# Patient Record
Sex: FEMALE | Race: WHITE | NOT HISPANIC OR LATINO | Employment: FULL TIME | URBAN - METROPOLITAN AREA
[De-identification: names, ages, dates, MRNs, and addresses within clinical notes are randomized per-mention and may not be internally consistent; named-entity substitution may affect disease eponyms.]

---

## 2018-07-18 ENCOUNTER — CLINICAL SUPPORT (OUTPATIENT)
Dept: SMOKING CESSATION | Facility: CLINIC | Age: 58
End: 2018-07-18
Payer: COMMERCIAL

## 2018-07-18 VITALS
WEIGHT: 150 LBS | SYSTOLIC BLOOD PRESSURE: 136 MMHG | HEIGHT: 65 IN | BODY MASS INDEX: 24.99 KG/M2 | DIASTOLIC BLOOD PRESSURE: 87 MMHG

## 2018-07-18 DIAGNOSIS — F17.210 NICOTINE DEPENDENCE, CIGARETTES, UNCOMPLICATED: Primary | ICD-10-CM

## 2018-07-18 PROCEDURE — 99999 PR PBB SHADOW E&M-NEW PATIENT-LVL II: CPT | Mod: PBBFAC,,,

## 2018-07-18 PROCEDURE — 99404 PREV MED CNSL INDIV APPRX 60: CPT | Mod: S$GLB,,, | Performed by: INTERNAL MEDICINE

## 2018-07-18 RX ORDER — GABAPENTIN 300 MG/1
300 CAPSULE ORAL NIGHTLY
COMMUNITY

## 2018-07-18 RX ORDER — GUAIFENESIN, PSEUDOEPHEDRINE HYDROCHLORIDE 600; 60 MG/1; MG/1
1 TABLET, EXTENDED RELEASE ORAL
COMMUNITY
End: 2021-02-04

## 2018-07-18 RX ORDER — VARENICLINE TARTRATE 0.5 (11)-1
KIT ORAL
Qty: 1 PACKAGE | Refills: 0 | Status: SHIPPED | OUTPATIENT
Start: 2018-07-18 | End: 2018-08-14 | Stop reason: DRUGHIGH

## 2018-07-18 NOTE — Clinical Note
IRMA D score of 6 is perceived as no mental distress or depression at this time. Patient will be participating in weekly tobacco cessation meetings and will begin the prescribed tobacco cessation medication regimen of  Chantix starter pack. Patient requested Chantix and has used Chantix before without any negative effects.

## 2018-07-31 ENCOUNTER — CLINICAL SUPPORT (OUTPATIENT)
Dept: SMOKING CESSATION | Facility: CLINIC | Age: 58
End: 2018-07-31
Payer: COMMERCIAL

## 2018-07-31 DIAGNOSIS — F17.210 NICOTINE DEPENDENCE, CIGARETTES, UNCOMPLICATED: Primary | ICD-10-CM

## 2018-07-31 PROCEDURE — 99404 PREV MED CNSL INDIV APPRX 60: CPT | Mod: S$GLB,,, | Performed by: INTERNAL MEDICINE

## 2018-07-31 PROCEDURE — 99999 PR PBB SHADOW E&M-EST. PATIENT-LVL I: CPT | Mod: PBBFAC,,,

## 2018-07-31 NOTE — PROGRESS NOTES
Individual Follow-Up Form    7/31/2018    Quit Date:     Clinical Status of Patient: Outpatient    Length of Service: 60 minutes    Continuing Medication: yes  Chantix         Target Symptoms: Withdrawal and medication side effects. The following were  rated moderate (3) to severe (4) on TCRS:  · Moderate (3): none  · Severe (4): none    Comments: Session 1:  Orientation, client introductions, completion of TCRS (Tobacco Cessation Rating Scale) learned addiction model, cues/triggers, personal reasons for quitting, medications, goals, quit date.  Patient is smoking 10 cigarettes per day with last smoked 2 hours prior to visit and has a measured CO of 11 ppm. Patient remains on prescribed tobacco cessation medication regimen 1 mg Chantix  without any negative side effects at this time.    Diagnosis: F17.210    Next Visit: 1 week

## 2018-08-07 ENCOUNTER — CLINICAL SUPPORT (OUTPATIENT)
Dept: SMOKING CESSATION | Facility: CLINIC | Age: 58
End: 2018-08-07
Payer: COMMERCIAL

## 2018-08-07 DIAGNOSIS — F17.210 NICOTINE DEPENDENCE, CIGARETTES, UNCOMPLICATED: Primary | ICD-10-CM

## 2018-08-07 PROCEDURE — 99999 PR PBB SHADOW E&M-EST. PATIENT-LVL I: CPT | Mod: PBBFAC,,,

## 2018-08-07 PROCEDURE — 99404 PREV MED CNSL INDIV APPRX 60: CPT | Mod: S$GLB,,, | Performed by: INTERNAL MEDICINE

## 2018-08-07 NOTE — PROGRESS NOTES
Smoking Cessation Group Session #2    Site: [facility]    Date:  8/7/2018  Clinical Status of Patient: Outpatient   Length of Service and Code: 60 minutes - 99107   Group Activities/Focus of Group:  Sharing last weeks challenges, triggers, and coping activities to remain quit and/ or keep making progress toward cessation, completion of TCRS (Tobacco Cessation Rating Scale) learned addiction model, personal reasons for quitting, medications, goals, quit date.        Target symptoms:  withdrawal and medication side effects             The following were rated moderate (3) to severe (4) on TCRS:       Moderate 3: none     Severe 4:   none  Patient's Response to Intervention: patient has reduced smoking to 15 cigarettes per day with last smoked 30 minutes prior to visit and has a measured CO of 14 ppm. A reduction strategy will be employed to reduce  Smoking by 25 %. Patient remains on prescribed tobacco cessation medication regimen of 1 mg Chantix BID without any negative side effects at this time.   Progress Toward Goals and Other Mental Status Changes: The patient denies any abnormal behavioral or mental changes at this time.         Diagnosis: Z72.0  Plan: The patient will continue with group therapy sessions and medication monitoring by CTTS. Prescribed medication management will be by physician.     Return to Clinic: 1 week    Quit Date:    Planned Quit Date:

## 2018-08-13 DIAGNOSIS — F17.210 NICOTINE DEPENDENCE, CIGARETTES, UNCOMPLICATED: ICD-10-CM

## 2018-08-14 ENCOUNTER — CLINICAL SUPPORT (OUTPATIENT)
Dept: SMOKING CESSATION | Facility: CLINIC | Age: 58
End: 2018-08-14
Payer: COMMERCIAL

## 2018-08-14 DIAGNOSIS — F17.210 NICOTINE DEPENDENCE, CIGARETTES, UNCOMPLICATED: Primary | ICD-10-CM

## 2018-08-14 PROCEDURE — 99999 PR PBB SHADOW E&M-EST. PATIENT-LVL I: CPT | Mod: PBBFAC,,,

## 2018-08-14 PROCEDURE — 99404 PREV MED CNSL INDIV APPRX 60: CPT | Mod: S$GLB,,, | Performed by: INTERNAL MEDICINE

## 2018-08-14 RX ORDER — VARENICLINE TARTRATE 1 MG/1
1 TABLET, FILM COATED ORAL 2 TIMES DAILY
Qty: 60 TABLET | Refills: 0 | Status: SHIPPED | OUTPATIENT
Start: 2018-08-14 | End: 2021-02-04

## 2018-08-14 NOTE — PROGRESS NOTES
Individual Follow-Up Form    8/14/2018    Quit Date:     Clinical Status of Patient: Outpatient    Length of Service: 60 minutes    Continuing Medication: Chantix     Target Symptoms: Withdrawal and medication side effects. The following were  rated moderate (3) to severe (4) on TCRS:  · Moderate (3): none  · Severe (4): none    Comments: Patient has been tobacco free for 1 day and has a measured CO of 3 ppm. completion of TCRS (Tobacco Cessation Rating Scale) reviewed strategies, controlling environment, cues, triggers, new goals set. Introduced high risk situations with preparation interventions, caffeine similarities with withdrawal issues of habit and nicotine, Alcohol, Understanding urges, cravings, stress and relaxation. Open discussion with intervention discussion.Patient remains on prescribed tobacco cessation medication regimen of 1 mg Chantix  without any negative side effects at this time.        Diagnosis: F17.210    Next Visit: 1 week

## 2018-08-20 RX ORDER — VARENICLINE TARTRATE 0.5 (11)-1
KIT ORAL
Qty: 53 TABLET | Refills: 0 | Status: SHIPPED | OUTPATIENT
Start: 2018-08-20 | End: 2021-02-04

## 2018-08-21 ENCOUNTER — CLINICAL SUPPORT (OUTPATIENT)
Dept: SMOKING CESSATION | Facility: CLINIC | Age: 58
End: 2018-08-21
Payer: COMMERCIAL

## 2018-08-21 DIAGNOSIS — F17.210 NICOTINE DEPENDENCE, CIGARETTES, UNCOMPLICATED: Primary | ICD-10-CM

## 2018-08-21 PROCEDURE — 99999 PR PBB SHADOW E&M-EST. PATIENT-LVL II: CPT | Mod: PBBFAC,,,

## 2018-08-21 PROCEDURE — 99404 PREV MED CNSL INDIV APPRX 60: CPT | Mod: S$GLB,,, | Performed by: INTERNAL MEDICINE

## 2018-08-21 NOTE — PROGRESS NOTES
Individual Follow-Up Form    8/21/2018    Quit Date:     Clinical Status of Patient: Outpatient    Length of Service: 60 minutes    Continuing Medication: yes  Chantix         Target Symptoms: Withdrawal and medication side effects. The following were  rated moderate (3) to severe (4) on TCRS:  · Moderate (3): none  · Severe (4): none    Comments: patient is smoking 2 cigarettes per day with last smoked 5 hours prior to visit and has a measured CO of 8 ppm. Patient remains on prescribed tobacco cessation medication regimen of 1 mg Chantix without any negative side effects at this time.completion of TCRS (Tobacco Cessation Rating Scale) reviewed strategies, habitual behavior, stress, and high risk situations. Introduced stress with addition interventions, SOLVE, relaxation with interventions, nutrition, exercise, weight gain, and the importance of rewarding oneself for accomplishments toward becoming tobacco free. Open discussion of all items with interventions.         Diagnosis: F17.210    Next Visit: 1 week

## 2018-08-28 ENCOUNTER — CLINICAL SUPPORT (OUTPATIENT)
Dept: SMOKING CESSATION | Facility: CLINIC | Age: 58
End: 2018-08-28
Payer: COMMERCIAL

## 2018-08-28 DIAGNOSIS — F17.210 NICOTINE DEPENDENCE, CIGARETTES, UNCOMPLICATED: Primary | ICD-10-CM

## 2018-08-28 PROCEDURE — 99404 PREV MED CNSL INDIV APPRX 60: CPT | Mod: S$GLB,,, | Performed by: INTERNAL MEDICINE

## 2018-08-28 PROCEDURE — 99999 PR PBB SHADOW E&M-EST. PATIENT-LVL I: CPT | Mod: PBBFAC,,,

## 2018-08-28 RX ORDER — BUPROPION HYDROCHLORIDE 150 MG/1
150 TABLET, EXTENDED RELEASE ORAL 2 TIMES DAILY
Qty: 60 TABLET | Refills: 0 | Status: SHIPPED | OUTPATIENT
Start: 2018-08-28 | End: 2018-09-26 | Stop reason: SDUPTHER

## 2018-08-28 NOTE — Clinical Note
patient has reduced smoking to 2 cigarettes per day and has been tobacco free for 1 day . Patient has a measured CO of 4 ppm. Patient is experiencing life stressors which triggers smoking episodes. Patient remains on prescribed tobacco cessation medication regimen of Chantix without any negative effects, 150 mg Wellbutrin will be added to the regimen no contraindications noted.

## 2018-08-28 NOTE — PROGRESS NOTES
Individual Follow-Up Form    8/28/2018    Quit Date:     Clinical Status of Patient: Outpatient    Length of Service: 60 minutes    Continuing Medication: yes  Chantix    Other Medications: Wellbutrin     Target Symptoms: Withdrawal and medication side effects. The following were  rated moderate (3) to severe (4) on TCRS:  · Moderate (3): none  · Severe (4): none    Comments: patient has reduced smoking to 2 cigarettes per day and has been tobacco free for 1 day . Patient has a measured CO of 4 ppm. Patient is experiencing life stressors which triggers smoking episodes. Patient remains on prescribed tobacco cessation medication regimen of Chantix without any negative effects, 150 mg Wellbutrin will be added to the regimen.   Diagnosis: F17.210    Next Visit: 1 week

## 2018-09-05 ENCOUNTER — CLINICAL SUPPORT (OUTPATIENT)
Dept: SMOKING CESSATION | Facility: CLINIC | Age: 58
End: 2018-09-05
Payer: COMMERCIAL

## 2018-09-05 DIAGNOSIS — F17.210 NICOTINE DEPENDENCE, CIGARETTES, UNCOMPLICATED: Primary | ICD-10-CM

## 2018-09-05 PROCEDURE — 99407 BEHAV CHNG SMOKING > 10 MIN: CPT | Mod: S$GLB,,, | Performed by: GENERAL PRACTICE

## 2018-09-05 NOTE — PROGRESS NOTES
We reviewed strategies for  habitual behavior, high risks situations, understanding urges and cravings, stress and relaxation and discussed additional interventions.

## 2018-09-16 DIAGNOSIS — F17.210 NICOTINE DEPENDENCE, CIGARETTES, UNCOMPLICATED: ICD-10-CM

## 2018-09-26 DIAGNOSIS — F17.210 NICOTINE DEPENDENCE, CIGARETTES, UNCOMPLICATED: ICD-10-CM

## 2018-09-26 RX ORDER — BUPROPION HYDROCHLORIDE 150 MG/1
TABLET, EXTENDED RELEASE ORAL
Qty: 60 TABLET | Refills: 0 | Status: SHIPPED | OUTPATIENT
Start: 2018-09-26 | End: 2018-10-23 | Stop reason: SDUPTHER

## 2018-10-11 RX ORDER — VARENICLINE TARTRATE 0.5 (11)-1
KIT ORAL
Refills: 0 | OUTPATIENT
Start: 2018-10-11

## 2018-10-23 DIAGNOSIS — F17.210 NICOTINE DEPENDENCE, CIGARETTES, UNCOMPLICATED: ICD-10-CM

## 2018-10-23 RX ORDER — BUPROPION HYDROCHLORIDE 150 MG/1
TABLET, EXTENDED RELEASE ORAL
Qty: 60 TABLET | Refills: 0 | Status: SHIPPED | OUTPATIENT
Start: 2018-10-23 | End: 2018-11-19 | Stop reason: SDUPTHER

## 2018-11-19 DIAGNOSIS — F17.210 NICOTINE DEPENDENCE, CIGARETTES, UNCOMPLICATED: ICD-10-CM

## 2018-11-19 RX ORDER — BUPROPION HYDROCHLORIDE 150 MG/1
TABLET, EXTENDED RELEASE ORAL
Qty: 60 TABLET | Refills: 0 | Status: SHIPPED | OUTPATIENT
Start: 2018-11-19 | End: 2018-12-15 | Stop reason: SDUPTHER

## 2018-12-15 DIAGNOSIS — F17.210 NICOTINE DEPENDENCE, CIGARETTES, UNCOMPLICATED: ICD-10-CM

## 2018-12-15 RX ORDER — BUPROPION HYDROCHLORIDE 150 MG/1
TABLET, EXTENDED RELEASE ORAL
Qty: 60 TABLET | Refills: 0 | Status: SHIPPED | OUTPATIENT
Start: 2018-12-15 | End: 2019-01-14 | Stop reason: SDUPTHER

## 2019-01-14 DIAGNOSIS — F17.210 NICOTINE DEPENDENCE, CIGARETTES, UNCOMPLICATED: ICD-10-CM

## 2019-01-14 RX ORDER — BUPROPION HYDROCHLORIDE 150 MG/1
TABLET, EXTENDED RELEASE ORAL
Qty: 60 TABLET | Refills: 0 | Status: SHIPPED | OUTPATIENT
Start: 2019-01-14 | End: 2019-02-14 | Stop reason: SDUPTHER

## 2019-01-22 ENCOUNTER — TELEPHONE (OUTPATIENT)
Dept: SMOKING CESSATION | Facility: CLINIC | Age: 59
End: 2019-01-22

## 2019-02-14 DIAGNOSIS — F17.210 NICOTINE DEPENDENCE, CIGARETTES, UNCOMPLICATED: ICD-10-CM

## 2019-02-14 RX ORDER — BUPROPION HYDROCHLORIDE 150 MG/1
TABLET, EXTENDED RELEASE ORAL
Qty: 60 TABLET | Refills: 0 | Status: SHIPPED | OUTPATIENT
Start: 2019-02-14 | End: 2019-03-22 | Stop reason: SDUPTHER

## 2019-02-15 ENCOUNTER — CLINICAL SUPPORT (OUTPATIENT)
Dept: SMOKING CESSATION | Facility: CLINIC | Age: 59
End: 2019-02-15
Payer: COMMERCIAL

## 2019-02-15 DIAGNOSIS — F17.200 NICOTINE DEPENDENCE: Primary | ICD-10-CM

## 2019-02-15 PROCEDURE — 99407 BEHAV CHNG SMOKING > 10 MIN: CPT | Mod: S$GLB,,, | Performed by: INTERNAL MEDICINE

## 2019-02-15 PROCEDURE — 99407 PR TOBACCO USE CESSATION INTENSIVE >10 MINUTES: ICD-10-PCS | Mod: S$GLB,,, | Performed by: INTERNAL MEDICINE

## 2019-02-15 NOTE — PROGRESS NOTES
Spoke with patient today in regard to smoking cessation progress for 3/6 month telephone follow up, she states tobacco free since 9/6/2018. Commended patient on the accomplishment. Patient states she has gained about 20 pounds since her quit. Informed patient of benefit period, future follow up, and contact information if any further help or support is needed. Will complete smart form for 3 and 6 month follow up on Quit attempt #1.

## 2019-03-22 DIAGNOSIS — F17.210 NICOTINE DEPENDENCE, CIGARETTES, UNCOMPLICATED: ICD-10-CM

## 2019-03-22 RX ORDER — BUPROPION HYDROCHLORIDE 150 MG/1
TABLET, EXTENDED RELEASE ORAL
Qty: 60 TABLET | Refills: 0 | Status: SHIPPED | OUTPATIENT
Start: 2019-03-22 | End: 2019-04-25 | Stop reason: SDUPTHER

## 2019-04-25 DIAGNOSIS — F17.210 NICOTINE DEPENDENCE, CIGARETTES, UNCOMPLICATED: ICD-10-CM

## 2019-04-25 RX ORDER — BUPROPION HYDROCHLORIDE 150 MG/1
TABLET, EXTENDED RELEASE ORAL
Qty: 60 TABLET | Refills: 0 | Status: SHIPPED | OUTPATIENT
Start: 2019-04-25 | End: 2019-06-02 | Stop reason: SDUPTHER

## 2019-06-02 DIAGNOSIS — F17.210 NICOTINE DEPENDENCE, CIGARETTES, UNCOMPLICATED: ICD-10-CM

## 2019-06-02 RX ORDER — BUPROPION HYDROCHLORIDE 150 MG/1
TABLET, EXTENDED RELEASE ORAL
Qty: 60 TABLET | Refills: 0 | Status: SHIPPED | OUTPATIENT
Start: 2019-06-02 | End: 2019-06-27 | Stop reason: SDUPTHER

## 2019-06-27 DIAGNOSIS — F17.210 NICOTINE DEPENDENCE, CIGARETTES, UNCOMPLICATED: ICD-10-CM

## 2019-06-27 RX ORDER — BUPROPION HYDROCHLORIDE 150 MG/1
TABLET, EXTENDED RELEASE ORAL
Qty: 60 TABLET | Refills: 0 | Status: SHIPPED | OUTPATIENT
Start: 2019-06-27 | End: 2020-04-15

## 2019-07-01 DIAGNOSIS — F17.210 NICOTINE DEPENDENCE, CIGARETTES, UNCOMPLICATED: ICD-10-CM

## 2019-07-01 RX ORDER — BUPROPION HYDROCHLORIDE 150 MG/1
TABLET, EXTENDED RELEASE ORAL
Qty: 60 TABLET | Refills: 0 | Status: SHIPPED | OUTPATIENT
Start: 2019-07-01 | End: 2020-04-15

## 2019-07-18 DIAGNOSIS — F17.210 NICOTINE DEPENDENCE, CIGARETTES, UNCOMPLICATED: ICD-10-CM

## 2019-07-18 RX ORDER — BUPROPION HYDROCHLORIDE 150 MG/1
TABLET, EXTENDED RELEASE ORAL
Qty: 60 TABLET | Refills: 0 | Status: SHIPPED | OUTPATIENT
Start: 2019-07-18 | End: 2019-09-04 | Stop reason: SDUPTHER

## 2019-07-24 ENCOUNTER — CLINICAL SUPPORT (OUTPATIENT)
Dept: SMOKING CESSATION | Facility: CLINIC | Age: 59
End: 2019-07-24
Payer: COMMERCIAL

## 2019-07-24 DIAGNOSIS — F17.200 NICOTINE DEPENDENCE: Primary | ICD-10-CM

## 2019-07-24 PROCEDURE — 99407 BEHAV CHNG SMOKING > 10 MIN: CPT | Mod: S$GLB,,,

## 2019-07-24 PROCEDURE — 99407 PR TOBACCO USE CESSATION INTENSIVE >10 MINUTES: ICD-10-PCS | Mod: S$GLB,,,

## 2019-07-24 NOTE — PROGRESS NOTES
Spoke with patient today in regard to smoking cessation progress for 12 month follow up, she is currently tobacco free. Congratulated patient on the quit episode. Informed patient of benefit period, and contact information if any further help or support is needed.  Completed / resolved smart form for 12 month follow up on Quit 1

## 2019-09-04 DIAGNOSIS — F17.210 NICOTINE DEPENDENCE, CIGARETTES, UNCOMPLICATED: ICD-10-CM

## 2019-09-04 RX ORDER — BUPROPION HYDROCHLORIDE 150 MG/1
TABLET, EXTENDED RELEASE ORAL
Qty: 60 TABLET | Refills: 0 | Status: SHIPPED | OUTPATIENT
Start: 2019-09-04 | End: 2019-11-15 | Stop reason: SDUPTHER

## 2019-11-15 DIAGNOSIS — F17.210 NICOTINE DEPENDENCE, CIGARETTES, UNCOMPLICATED: ICD-10-CM

## 2019-11-15 RX ORDER — BUPROPION HYDROCHLORIDE 150 MG/1
TABLET, EXTENDED RELEASE ORAL
Qty: 60 TABLET | Refills: 0 | Status: SHIPPED | OUTPATIENT
Start: 2019-11-15 | End: 2020-01-20

## 2020-01-19 DIAGNOSIS — F17.210 NICOTINE DEPENDENCE, CIGARETTES, UNCOMPLICATED: ICD-10-CM

## 2020-01-20 RX ORDER — BUPROPION HYDROCHLORIDE 150 MG/1
TABLET, EXTENDED RELEASE ORAL
Qty: 60 TABLET | Refills: 0 | Status: SHIPPED | OUTPATIENT
Start: 2020-01-20 | End: 2020-02-24

## 2020-02-23 DIAGNOSIS — F17.210 NICOTINE DEPENDENCE, CIGARETTES, UNCOMPLICATED: ICD-10-CM

## 2020-02-24 RX ORDER — BUPROPION HYDROCHLORIDE 150 MG/1
TABLET, EXTENDED RELEASE ORAL
Qty: 60 TABLET | Refills: 0 | Status: SHIPPED | OUTPATIENT
Start: 2020-02-24 | End: 2020-03-18

## 2020-03-18 DIAGNOSIS — F17.210 NICOTINE DEPENDENCE, CIGARETTES, UNCOMPLICATED: ICD-10-CM

## 2020-03-18 RX ORDER — BUPROPION HYDROCHLORIDE 150 MG/1
TABLET, EXTENDED RELEASE ORAL
Qty: 60 TABLET | Refills: 0 | Status: SHIPPED | OUTPATIENT
Start: 2020-03-18 | End: 2020-04-15 | Stop reason: SDUPTHER

## 2020-04-14 DIAGNOSIS — F17.210 NICOTINE DEPENDENCE, CIGARETTES, UNCOMPLICATED: ICD-10-CM

## 2020-04-15 RX ORDER — BUPROPION HYDROCHLORIDE 150 MG/1
TABLET, EXTENDED RELEASE ORAL
Qty: 60 TABLET | Status: SHIPPED | OUTPATIENT
Start: 2020-04-15 | End: 2021-05-10 | Stop reason: SDUPTHER

## 2020-10-20 ENCOUNTER — TELEPHONE (OUTPATIENT)
Dept: ALLERGY | Facility: CLINIC | Age: 60
End: 2020-10-20

## 2020-10-20 NOTE — TELEPHONE ENCOUNTER
Called pt and she sates 11/4 is fine, she just wants to make sure she can get switched over to [facility] before she is due for her next IVIG on 11/13.

## 2020-10-20 NOTE — TELEPHONE ENCOUNTER
----- Message from Susana Dahl sent at 10/20/2020 12:23 PM CDT -----  Regarding: wants appt with Josie  Type:  Sooner Apoointment Request    Caller is requesting a sooner appointment.  Caller declined first available appointment listed below.  Caller will not accept being placed on the waitlist and is requesting a message be sent to doctor.  Name of Caller:pt  When is the first available appointment?n/a  Symptoms:pre approval for medication  Would the patient rather a call back or a response via [facility]? Call back  Best Call Back Number:[phone]  Additional Information: Please call back. Thanks

## 2020-10-20 NOTE — TELEPHONE ENCOUNTER
She will come in on 11/4 for her new patient appointment and was informed that orders will be placed after her appointment.

## 2020-10-21 NOTE — TELEPHONE ENCOUNTER
Called patient and informed her of the process of the IVIG and that it may take up to 2 weeks to get started. She understands and will wait for the appointments.

## 2020-11-02 ENCOUNTER — TELEPHONE (OUTPATIENT)
Dept: ALLERGY | Facility: CLINIC | Age: 60
End: 2020-11-02

## 2020-11-02 NOTE — TELEPHONE ENCOUNTER
Spoke with infusion nurse, Ольга Newell, and gave her our fax #, as requested.      ----- Message from Mag Lion sent at 11/2/2020  3:42 PM CST -----  Regarding: pt advice  Pt has been seeing  for a while now . Pt's nurse need to fax some information over regarding pt's last visit. Please give nurse (Ольга Newell ) a call back at [phone].    Clinical nurse for [facility]

## 2020-11-04 ENCOUNTER — OFFICE VISIT (OUTPATIENT)
Dept: ALLERGY | Facility: CLINIC | Age: 60
End: 2020-11-04
Payer: COMMERCIAL

## 2020-11-04 VITALS
HEART RATE: 76 BPM | HEIGHT: 66 IN | BODY MASS INDEX: 27.03 KG/M2 | DIASTOLIC BLOOD PRESSURE: 95 MMHG | TEMPERATURE: 98 F | WEIGHT: 168.19 LBS | SYSTOLIC BLOOD PRESSURE: 144 MMHG

## 2020-11-04 DIAGNOSIS — D83.9 CVID (COMMON VARIABLE IMMUNODEFICIENCY): Primary | ICD-10-CM

## 2020-11-04 PROCEDURE — 3008F PR BODY MASS INDEX (BMI) DOCUMENTED: ICD-10-PCS | Mod: CPTII,S$GLB,, | Performed by: ALLERGY & IMMUNOLOGY

## 2020-11-04 PROCEDURE — 3008F BODY MASS INDEX DOCD: CPT | Mod: CPTII,S$GLB,, | Performed by: ALLERGY & IMMUNOLOGY

## 2020-11-04 PROCEDURE — 99999 PR PBB SHADOW E&M-EST. PATIENT-LVL IV: ICD-10-PCS | Mod: PBBFAC,,, | Performed by: ALLERGY & IMMUNOLOGY

## 2020-11-04 PROCEDURE — 99204 PR OFFICE/OUTPT VISIT, NEW, LEVL IV, 45-59 MIN: ICD-10-PCS | Mod: S$GLB,,, | Performed by: ALLERGY & IMMUNOLOGY

## 2020-11-04 PROCEDURE — 99204 OFFICE O/P NEW MOD 45 MIN: CPT | Mod: S$GLB,,, | Performed by: ALLERGY & IMMUNOLOGY

## 2020-11-04 PROCEDURE — 99999 PR PBB SHADOW E&M-EST. PATIENT-LVL IV: CPT | Mod: PBBFAC,,, | Performed by: ALLERGY & IMMUNOLOGY

## 2020-11-04 PROCEDURE — 1126F AMNT PAIN NOTED NONE PRSNT: CPT | Mod: S$GLB,,, | Performed by: ALLERGY & IMMUNOLOGY

## 2020-11-04 PROCEDURE — 1126F PR PAIN SEVERITY QUANTIFIED, NO PAIN PRESENT: ICD-10-PCS | Mod: S$GLB,,, | Performed by: ALLERGY & IMMUNOLOGY

## 2020-11-04 RX ORDER — SODIUM, POTASSIUM,MAG SULFATES 17.5-3.13G
SOLUTION, RECONSTITUTED, ORAL ORAL
COMMUNITY
Start: 2020-10-03 | End: 2021-02-04

## 2020-11-04 RX ORDER — CLINDAMYCIN HYDROCHLORIDE 150 MG/1
150 CAPSULE ORAL
COMMUNITY
End: 2021-08-05 | Stop reason: ALTCHOICE

## 2020-11-04 RX ORDER — IMMUNE GLOBULIN (HUMAN) 10 G/100ML
30 INJECTION INTRAVENOUS; SUBCUTANEOUS
COMMUNITY
End: 2021-03-08 | Stop reason: SDUPTHER

## 2020-11-04 RX ORDER — PREDNISONE 10 MG/1
TABLET ORAL
COMMUNITY
End: 2021-02-04

## 2020-11-04 RX ORDER — FLUTICASONE FUROATE, UMECLIDINIUM BROMIDE AND VILANTEROL TRIFENATATE 100; 62.5; 25 UG/1; UG/1; UG/1
POWDER RESPIRATORY (INHALATION)
COMMUNITY
Start: 2020-10-02 | End: 2021-02-04

## 2020-11-04 RX ORDER — DIPHENHYDRAMINE HCL 25 MG
25 CAPSULE ORAL
COMMUNITY

## 2020-11-04 NOTE — PROGRESS NOTES
Subjective:       Patient ID: María Julian is a 60 y.o. female.    Chief Complaint:  Immunodeficiency      HPI: 60 year old female previous patient of Dr. Galindo. She missed her IVIG last month. She was using [facility] and she wants to start receiving her injections here.  She [place] hour [place] here. She has been on IVIG, since 2010. No hospitalizations for infections. No episodes of pneumonia. She can not receive a dose before the 18th becasue she had a person come to her house last month.  She has hemachromatosis.     Past Medical History:   Diagnosis Date    Allergy     Asthma     COPD (chronic obstructive pulmonary disease)     Myocardial infarction     from EKG march 2018     Family History:  Liver disease    Current Outpatient Medications on File Prior to Visit   Medication Sig Dispense Refill    clindamycin (CLEOCIN) 150 MG capsule Take 150 mg by mouth. Keep on hand      diphenhydrAMINE (BENADRYL) 25 mg capsule Before infusions      gabapentin (NEURONTIN) 300 MG capsule Take 300 mg by mouth 3 (three) times daily.      immun glob G, IgG,-gly-IgA 50+, GAMUNEX-C/GAMMAKED, (GAMUNEX-C) 10 gram/100 mL (10 %) Soln Inject 30 g into the vein every 30 days.      predniSONE (DELTASONE) 10 MG tablet Keep on hand      SUPREP BOWEL PREP KIT 17.5-3.13-1.6 gram SolR TAKE 1 KIT BY MOUTH SPLIT DOSE AS DIRECTED FOR COLONOSCOPY PREP      TRELEGY ELLIPTA 100-62.5-25 mcg DsDv INHALE 1 PUFF BY MOUTH ONCE DAILY **RINSE MOUTH AFTER USE**      buPROPion (WELLBUTRIN SR) 150 MG TBSR 12 hr tablet TAKE 1 TABLET BY MOUTH TWICE A DAY 60 tablet PRN    pseudoephedrine-guaifenesin  mg (MUCINEX D)  mg per tablet Take 1 tablet by mouth every 12 (twelve) hours.      varenicline (CHANTIX STARTING MONTH BOX) 0.5 mg (11)- 1 mg (42) tablet TAKE 1/2 TAB DAILY FOR 3 DAYS THEN INCREASE TO 0.5 TAB TWICE DAILY X 4 DAYS,INCREASE TO 1 TAB 2/DAY 53 tablet 0    varenicline (CHANTIX) 1 mg Tab Take 1 tablet (1 mg  total) by mouth 2 (two) times daily. 60 tablet 0     No current facility-administered medications on file prior to visit.      Review of patient's allergies indicates:   Allergen Reactions    Macrolide antibiotics Anaphylaxis    Erythromycin     Levofloxacin Other (See Comments)     Pains in her tendons/joints       Environmental History: 8 dog She stopped smoking 2 years ago.  Review of Systems   Constitutional: Negative for chills and fever.   HENT: Negative for congestion, rhinorrhea and sneezing.    Eyes: Negative for discharge and itching.   Respiratory: Negative for chest tightness and shortness of breath.    Cardiovascular: Negative for chest pain and leg swelling.   Gastrointestinal: Negative for nausea and vomiting.   Skin: Negative for rash and wound.   Allergic/Immunologic: Positive for environmental allergies and immunocompromised state.   Neurological: Negative for facial asymmetry and speech difficulty.   Hematological: Negative for adenopathy. Does not bruise/bleed easily.   Psychiatric/Behavioral: Negative for behavioral problems and suicidal ideas.        Objective:    Physical Exam  Vitals signs reviewed.   Constitutional:       General: She is not in acute distress.     Appearance: Normal appearance. She is well-developed. She is not ill-appearing, toxic-appearing or diaphoretic.   HENT:      Head: Normocephalic and atraumatic.      Right Ear: Tympanic membrane, ear canal and external ear normal. There is no impacted cerumen.      Left Ear: Tympanic membrane, ear canal and external ear normal. There is no impacted cerumen.      Nose: Nose normal. No congestion or rhinorrhea.      Mouth/Throat:      Mouth: Mucous membranes are moist.      Pharynx: Oropharynx is clear. No oropharyngeal exudate or posterior oropharyngeal erythema.   Eyes:      General: No scleral icterus.        Right eye: No discharge.         Left eye: No discharge.      Pupils: Pupils are equal, round, and reactive to light.    Neck:      Musculoskeletal: Normal range of motion and neck supple. No neck rigidity or muscular tenderness.      Thyroid: No thyromegaly.   Cardiovascular:      Rate and Rhythm: Normal rate and regular rhythm.      Heart sounds: Normal heart sounds. No murmur. No friction rub. No gallop.    Pulmonary:      Effort: Pulmonary effort is normal. No respiratory distress.      Breath sounds: Normal breath sounds. No stridor. No wheezing, rhonchi or rales.   Chest:      Chest wall: No tenderness.   Abdominal:      General: Bowel sounds are normal.   Musculoskeletal: Normal range of motion.      Right lower leg: No edema.      Left lower leg: No edema.   Lymphadenopathy:      Cervical: No cervical adenopathy.   Skin:     General: Skin is warm.      Coloration: Skin is not jaundiced or pale.      Findings: No bruising, erythema or lesion.   Neurological:      General: No focal deficit present.      Mental Status: She is alert and oriented to person, place, and time.   Psychiatric:         Mood and Affect: Mood normal.         Behavior: Behavior normal.         Thought Content: Thought content normal.         Judgment: Judgment normal.           Assessment:       1. CVID (common variable immunodeficiency)         Plan:         CVID (common variable immunodeficiency)    Starting the process to transfer IVIG to [facility].    Continue IVIG.    RTC 3 months with Dr. Galindo.

## 2020-11-17 ENCOUNTER — PATIENT MESSAGE (OUTPATIENT)
Dept: ALLERGY | Facility: CLINIC | Age: 60
End: 2020-11-17

## 2020-11-17 PROBLEM — D83.9 CVID (COMMON VARIABLE IMMUNODEFICIENCY): Status: ACTIVE | Noted: 2020-11-17

## 2020-11-17 RX ORDER — DIPHENHYDRAMINE HYDROCHLORIDE 50 MG/ML
25 INJECTION INTRAMUSCULAR; INTRAVENOUS
Status: CANCELLED
Start: 2020-11-17

## 2020-11-17 RX ORDER — FAMOTIDINE 10 MG/ML
20 INJECTION INTRAVENOUS
Status: CANCELLED | OUTPATIENT
Start: 2020-11-17

## 2020-11-17 RX ORDER — ACETAMINOPHEN 325 MG/1
650 TABLET ORAL
Status: CANCELLED | OUTPATIENT
Start: 2020-11-17

## 2020-11-17 RX ORDER — HEPARIN 100 UNIT/ML
5 SYRINGE INTRAVENOUS
Status: CANCELLED
Start: 2020-11-17

## 2020-11-17 RX ORDER — SODIUM CHLORIDE 0.9 % (FLUSH) 0.9 %
10 SYRINGE (ML) INJECTION
Status: CANCELLED
Start: 2020-11-17

## 2020-11-17 RX ORDER — PREDNISONE 1 MG/1
10 TABLET ORAL
Status: CANCELLED
Start: 2020-11-17

## 2020-11-18 NOTE — TELEPHONE ENCOUNTER
Looks like the order was put in yesterday , but it says by Rex Ba the pharmacists. Please advise .

## 2020-11-25 ENCOUNTER — TELEPHONE (OUTPATIENT)
Dept: ALLERGY | Facility: CLINIC | Age: 60
End: 2020-11-25

## 2020-11-25 NOTE — TELEPHONE ENCOUNTER
Called pt and informed her that her infusion was approved for 2 doses only due to her insurance wanting her to switch to a lower cost setting(non-hospital) pt states she used [facility] at home before and [facility] and will go back to using either one if approved . Called [facility] and left a message with [facility] for someone in their auth dept to call back.

## 2020-11-30 ENCOUNTER — TELEPHONE (OUTPATIENT)
Dept: ALLERGY | Facility: CLINIC | Age: 60
End: 2020-11-30

## 2020-11-30 ENCOUNTER — INFUSION (OUTPATIENT)
Dept: INFUSION THERAPY | Facility: HOSPITAL | Age: 60
End: 2020-11-30
Attending: INTERNAL MEDICINE
Payer: COMMERCIAL

## 2020-11-30 VITALS
TEMPERATURE: 98 F | RESPIRATION RATE: 16 BRPM | BODY MASS INDEX: 27.32 KG/M2 | OXYGEN SATURATION: 95 % | HEIGHT: 66 IN | HEART RATE: 78 BPM | WEIGHT: 170 LBS | DIASTOLIC BLOOD PRESSURE: 77 MMHG | SYSTOLIC BLOOD PRESSURE: 130 MMHG

## 2020-11-30 DIAGNOSIS — D83.9 CVID (COMMON VARIABLE IMMUNODEFICIENCY): Primary | ICD-10-CM

## 2020-11-30 PROCEDURE — 63600175 PHARM REV CODE 636 W HCPCS: Mod: JG | Performed by: ALLERGY & IMMUNOLOGY

## 2020-11-30 PROCEDURE — 96375 TX/PRO/DX INJ NEW DRUG ADDON: CPT

## 2020-11-30 PROCEDURE — 96366 THER/PROPH/DIAG IV INF ADDON: CPT

## 2020-11-30 PROCEDURE — 63600175 PHARM REV CODE 636 W HCPCS: Performed by: ALLERGY & IMMUNOLOGY

## 2020-11-30 PROCEDURE — 96365 THER/PROPH/DIAG IV INF INIT: CPT

## 2020-11-30 RX ORDER — METHYLPREDNISOLONE SOD SUCC 125 MG
125 VIAL (EA) INJECTION
Status: COMPLETED | OUTPATIENT
Start: 2020-11-30 | End: 2020-11-30

## 2020-11-30 RX ORDER — DIPHENHYDRAMINE HYDROCHLORIDE 50 MG/ML
25 INJECTION INTRAMUSCULAR; INTRAVENOUS
Status: COMPLETED | OUTPATIENT
Start: 2020-11-30 | End: 2020-11-30

## 2020-11-30 RX ORDER — SODIUM CHLORIDE 0.9 % (FLUSH) 0.9 %
10 SYRINGE (ML) INJECTION
Status: DISCONTINUED | OUTPATIENT
Start: 2020-11-30 | End: 2020-11-30 | Stop reason: HOSPADM

## 2020-11-30 RX ORDER — PREDNISONE 1 MG/1
10 TABLET ORAL
Status: DISCONTINUED | OUTPATIENT
Start: 2020-11-30 | End: 2020-11-30

## 2020-11-30 RX ORDER — DIPHENHYDRAMINE HYDROCHLORIDE 50 MG/ML
25 INJECTION INTRAMUSCULAR; INTRAVENOUS
Status: CANCELLED
Start: 2020-12-28

## 2020-11-30 RX ORDER — SODIUM CHLORIDE 0.9 % (FLUSH) 0.9 %
10 SYRINGE (ML) INJECTION
Status: CANCELLED
Start: 2020-12-28

## 2020-11-30 RX ORDER — HUMAN IMMUNOGLOBULIN G 5 G/50ML
30 LIQUID INTRAVENOUS
Qty: 300 ML | Refills: 11 | OUTPATIENT
Start: 2020-11-30

## 2020-11-30 RX ORDER — HEPARIN 100 UNIT/ML
5 SYRINGE INTRAVENOUS
Status: CANCELLED
Start: 2020-12-28

## 2020-11-30 RX ORDER — PREDNISONE 1 MG/1
10 TABLET ORAL
Status: CANCELLED
Start: 2020-12-28

## 2020-11-30 RX ADMIN — DIPHENHYDRAMINE HYDROCHLORIDE 25 MG: 50 INJECTION, SOLUTION INTRAMUSCULAR; INTRAVENOUS at 11:11

## 2020-11-30 RX ADMIN — HUMAN IMMUNOGLOBULIN G 30 G: 20 LIQUID INTRAVENOUS at 11:11

## 2020-11-30 RX ADMIN — METHYLPREDNISOLONE SODIUM SUCCINATE 125 MG: 125 INJECTION, POWDER, FOR SOLUTION INTRAMUSCULAR; INTRAVENOUS at 11:11

## 2020-11-30 NOTE — PLAN OF CARE
Problem: Adult Inpatient Plan of Care  Goal: Plan of Care Review  Outcome: Ongoing, Progressing  Goal: Patient-Specific Goal (Individualization)  Outcome: Ongoing, Progressing  Flowsheets (Taken 11/30/2020 4099)  Individualized Care Needs: feet elevated, warm blanket, and pillow  Anxieties, Fears or Concerns: Concerned about the time the infusion will take  Patient-Specific Goals (Include Timeframe): to tolerate treatment today.     Problem: Fall Injury Risk  Goal: Absence of Fall and Fall-Related Injury  Outcome: Ongoing, Progressing   Patient reports, some diarrhea.

## 2020-11-30 NOTE — TELEPHONE ENCOUNTER
----- Message from Mehran Montelongo sent at 11/30/2020 10:40 AM CST -----  Contact: [facility] Valenzuela is calling in regards to referral, in order to get started they will need the clinical notes faxed over to [phone]. Please call her at [phone].    Thanks  DD

## 2020-11-30 NOTE — TELEPHONE ENCOUNTER
Michael Valenzuela with [facility] and she states they are starting the authorization process and needs clinicals I informed her that I will fax the office note from  and  last note.

## 2020-11-30 NOTE — NURSING
Patient tolerate treatment today without adverse reaction. Follow up appointments made.    Note:  Per the patient - she has received IVIG for ten years - and request to be run at the accelerated rate.  Dr. Mary messaged via [place] - ok to infuse at accelerated rate - and change the prednisone to solumedrol.

## 2020-11-30 NOTE — DISCHARGE INSTRUCTIONS
[facility]  [address] [facility]  [address]  [phone] phone     [phone] fax  Hours of Operation: Monday- Friday 8:00am- 5:00pm  After hours phone  [phone]  Hematology / Oncology Physicians on call      Dr. William Christianson, EUGENIA García, EUGENIA Berumen NP    Please call with any concerns regarding your appointment today.  WAYS TO HELP PREVENT INFECTION         WASH YOUR HANDS OFTEN DURING THE DAY, ESPECIALLY BEFORE YOU EAT, AFTER USING THE BATHROOM, AND AFTER TOUCHING ANIMALS     STAY AWAY FROM PEOPLE WHO HAVE ILLNESSES YOU CAN CATCH; SUCH AS COLDS, FLU, CHICKEN POX     TRY TO AVOID CROWDS     STAY AWAY FROM CHILDREN WHO RECENTLY HAVE RECEIVED LIVE VIRUS VACCINES     MAINTAIN GOOD MOUTH CARE     DO NOT SQUEEZE OR SCRATCH PIMPLES     CLEAN CUTS & SCRAPES RIGHT AWAY AND DAILY UNTIL HEALED WITH WARM WATER, SOAP & AN ANTISEPTIC     AVOID CONTACT WITH LITTER BOXES, BIRD CAGES, & FISH TANKS     AVOID STANDING WATER, IE., BIRD BATHS, FLOWER POTS/VASES, OR HUMIDIFIERS     WEAR GLOVES WHEN GARDENING OR CLEANING UP AFTER OTHERS, ESPECIALLY BABIES & SMALL CHILDREN     DO NOT EAT RAW FISH, SEAFOOD, MEAT, OR EGGS  FALL PREVENTION   Falls often occur due to slipping, tripping or losing your balance. Here are ways to reduce your risk of falling again.   Was there anything that caused your fall that can be fixed, removed or replaced?   Make your home safe by keeping walkways clear of objects you may trip over.   Use non-slip pads under rugs.   Do not walk in poorly lit areas.   Do not stand on chairs or wobbly ladders.   Use caution when reaching overhead or looking upward. This position can cause a loss of balance.   Be sure your shoes fit properly, have non-slip bottoms and are in good condition.   Be cautious when going up and down stairs, curbs, and when walking on  uneven sidewalks.   If your balance is poor, consider using a cane or walker.   If your fall was related to alcohol use, stop or limit alcohol intake.   If your fall was related to use of sleeping medicines, talk to your doctor about this. You may need to reduce your dosage at bedtime if you awaken during the night to go to the bathroom.   To reduce the need for nighttime bathroom trips:   Avoid drinking fluids for several hours before going to bed   Empty your bladder before going to bed   Men can keep a urinal at the bedside   © 8457-8776 [employer] [facility], [address] [place] [address], [place] [address]. All rights reserved. This information is not intended as a substitute for professional medical care. Always follow your healthcare professional's instructions.

## 2020-11-30 NOTE — TELEPHONE ENCOUNTER
----- Message from Audra Mary MD sent at 11/30/2020  8:37 AM CST -----  Regarding: FW: IVIG    ----- Message -----  From: Audra Mary MD  Sent: 11/30/2020   8:36 AM CST  To: Audra Mary MD  Subject: RE: IVIG                                         Hi,  I ordered Privigen again and I sent it to [facility]. Feel free to [facility] it to [facility] as well.Thanks.  ----- Message -----  From: Audra Mary MD  Sent: 11/30/2020   7:44 AM CST  To: Audra Mary MD  Subject: FW: IVIG                                           ----- Message -----  From: Gladys Pérez MA  Sent: 11/27/2020   2:39 PM CST  To: Audra Mary MD  Subject: IVIG                                             Hello,     Pt was approved for 2 doses at the infusion center , due to her insurance wanting her to go to a lower-cost setting for her treatment instead of the hospital , Pt states she has used [facility] before and [facility] for at home infusions before and will use either one again. Order will need to be placed again referring to an outside provider and I have the info for [facility] and the medication will still have to be Privigen if that works still for you. [facility] also does their own authorizations only orders need to be faxed .

## 2020-12-03 ENCOUNTER — TELEPHONE (OUTPATIENT)
Dept: ALLERGY | Facility: CLINIC | Age: 60
End: 2020-12-03

## 2020-12-03 NOTE — TELEPHONE ENCOUNTER
Mila called me and states pt is approved and authorized to get her infusions at [facility] and they will call her to schedule.

## 2020-12-11 ENCOUNTER — TELEPHONE (OUTPATIENT)
Dept: ALLERGY | Facility: CLINIC | Age: 60
End: 2020-12-11

## 2021-01-08 ENCOUNTER — TELEPHONE (OUTPATIENT)
Dept: ALLERGY | Facility: CLINIC | Age: 61
End: 2021-01-08

## 2021-02-04 ENCOUNTER — OFFICE VISIT (OUTPATIENT)
Dept: ALLERGY | Facility: CLINIC | Age: 61
End: 2021-02-04
Payer: COMMERCIAL

## 2021-02-04 VITALS
BODY MASS INDEX: 28.43 KG/M2 | TEMPERATURE: 98 F | WEIGHT: 170.63 LBS | HEART RATE: 69 BPM | SYSTOLIC BLOOD PRESSURE: 140 MMHG | HEIGHT: 65 IN | DIASTOLIC BLOOD PRESSURE: 82 MMHG

## 2021-02-04 DIAGNOSIS — D83.9 COMMON VARIABLE IMMUNODEFICIENCY: Primary | ICD-10-CM

## 2021-02-04 DIAGNOSIS — J32.9 RECURRENT SINUSITIS: ICD-10-CM

## 2021-02-04 DIAGNOSIS — J44.9 CHRONIC OBSTRUCTIVE PULMONARY DISEASE, UNSPECIFIED COPD TYPE: ICD-10-CM

## 2021-02-04 DIAGNOSIS — J40 BRONCHITIS: ICD-10-CM

## 2021-02-04 DIAGNOSIS — J45.40 MODERATE PERSISTENT ASTHMA WITHOUT COMPLICATION: ICD-10-CM

## 2021-02-04 DIAGNOSIS — M77.9 TENDONITIS: ICD-10-CM

## 2021-02-04 PROCEDURE — 99214 OFFICE O/P EST MOD 30 MIN: CPT | Mod: S$GLB,,, | Performed by: SPECIALIST

## 2021-02-04 PROCEDURE — 3008F PR BODY MASS INDEX (BMI) DOCUMENTED: ICD-10-PCS | Mod: CPTII,S$GLB,, | Performed by: SPECIALIST

## 2021-02-04 PROCEDURE — 99999 PR PBB SHADOW E&M-EST. PATIENT-LVL III: ICD-10-PCS | Mod: PBBFAC,,, | Performed by: SPECIALIST

## 2021-02-04 PROCEDURE — 99214 PR OFFICE/OUTPT VISIT, EST, LEVL IV, 30-39 MIN: ICD-10-PCS | Mod: S$GLB,,, | Performed by: SPECIALIST

## 2021-02-04 PROCEDURE — 99999 PR PBB SHADOW E&M-EST. PATIENT-LVL III: CPT | Mod: PBBFAC,,, | Performed by: SPECIALIST

## 2021-02-04 PROCEDURE — 3008F BODY MASS INDEX DOCD: CPT | Mod: CPTII,S$GLB,, | Performed by: SPECIALIST

## 2021-02-04 RX ORDER — PREDNISONE 20 MG/1
20 TABLET ORAL DAILY PRN
Qty: 20 TABLET | Refills: 0 | Status: SHIPPED | OUTPATIENT
Start: 2021-02-04 | End: 2021-02-09

## 2021-02-04 RX ORDER — FLUTICASONE FUROATE, UMECLIDINIUM BROMIDE AND VILANTEROL TRIFENATATE 200; 62.5; 25 UG/1; UG/1; UG/1
1 POWDER RESPIRATORY (INHALATION) DAILY
Qty: 60 EACH | Refills: 6 | Status: SHIPPED | OUTPATIENT
Start: 2021-02-04 | End: 2021-03-06

## 2021-02-27 ENCOUNTER — IMMUNIZATION (OUTPATIENT)
Dept: HEMATOLOGY/ONCOLOGY | Facility: CLINIC | Age: 61
End: 2021-02-27
Payer: COMMERCIAL

## 2021-02-27 DIAGNOSIS — Z23 NEED FOR VACCINATION: Primary | ICD-10-CM

## 2021-02-27 PROCEDURE — 0011A COVID-19, MRNA, LNP-S, PF, 100 MCG/0.5 ML DOSE VACCINE: CPT | Mod: S$GLB,,, | Performed by: FAMILY MEDICINE

## 2021-02-27 PROCEDURE — 91301 COVID-19, MRNA, LNP-S, PF, 100 MCG/0.5 ML DOSE VACCINE: ICD-10-PCS | Mod: S$GLB,,, | Performed by: FAMILY MEDICINE

## 2021-02-27 PROCEDURE — 91301 COVID-19, MRNA, LNP-S, PF, 100 MCG/0.5 ML DOSE VACCINE: CPT | Mod: S$GLB,,, | Performed by: FAMILY MEDICINE

## 2021-02-27 PROCEDURE — 0011A COVID-19, MRNA, LNP-S, PF, 100 MCG/0.5 ML DOSE VACCINE: ICD-10-PCS | Mod: S$GLB,,, | Performed by: FAMILY MEDICINE

## 2021-03-08 ENCOUNTER — OFFICE VISIT (OUTPATIENT)
Dept: INTERNAL MEDICINE | Facility: CLINIC | Age: 61
End: 2021-03-08
Payer: COMMERCIAL

## 2021-03-08 VITALS
HEART RATE: 68 BPM | OXYGEN SATURATION: 97 % | BODY MASS INDEX: 28.8 KG/M2 | DIASTOLIC BLOOD PRESSURE: 88 MMHG | TEMPERATURE: 98 F | WEIGHT: 173.06 LBS | SYSTOLIC BLOOD PRESSURE: 100 MMHG

## 2021-03-08 DIAGNOSIS — Z12.2 ENCOUNTER FOR SCREENING FOR MALIGNANT NEOPLASM OF RESPIRATORY ORGANS: ICD-10-CM

## 2021-03-08 DIAGNOSIS — E83.119 HEMOCHROMATOSIS, UNSPECIFIED HEMOCHROMATOSIS TYPE: ICD-10-CM

## 2021-03-08 DIAGNOSIS — Z12.31 ENCOUNTER FOR SCREENING MAMMOGRAM FOR MALIGNANT NEOPLASM OF BREAST: ICD-10-CM

## 2021-03-08 DIAGNOSIS — J44.9 CHRONIC OBSTRUCTIVE PULMONARY DISEASE, UNSPECIFIED COPD TYPE: ICD-10-CM

## 2021-03-08 DIAGNOSIS — Z00.00 ROUTINE GENERAL MEDICAL EXAMINATION AT A HEALTH CARE FACILITY: Primary | ICD-10-CM

## 2021-03-08 PROCEDURE — 1126F PR PAIN SEVERITY QUANTIFIED, NO PAIN PRESENT: ICD-10-PCS | Mod: S$GLB,,, | Performed by: INTERNAL MEDICINE

## 2021-03-08 PROCEDURE — 99386 PREV VISIT NEW AGE 40-64: CPT | Mod: 25,S$GLB,, | Performed by: INTERNAL MEDICINE

## 2021-03-08 PROCEDURE — 99999 PR PBB SHADOW E&M-EST. PATIENT-LVL V: CPT | Mod: PBBFAC,,, | Performed by: INTERNAL MEDICINE

## 2021-03-08 PROCEDURE — 3008F PR BODY MASS INDEX (BMI) DOCUMENTED: ICD-10-PCS | Mod: CPTII,S$GLB,, | Performed by: INTERNAL MEDICINE

## 2021-03-08 PROCEDURE — 1126F AMNT PAIN NOTED NONE PRSNT: CPT | Mod: S$GLB,,, | Performed by: INTERNAL MEDICINE

## 2021-03-08 PROCEDURE — 99999 PR PBB SHADOW E&M-EST. PATIENT-LVL V: ICD-10-PCS | Mod: PBBFAC,,, | Performed by: INTERNAL MEDICINE

## 2021-03-08 PROCEDURE — 99386 PR PREVENTIVE VISIT,NEW,40-64: ICD-10-PCS | Mod: 25,S$GLB,, | Performed by: INTERNAL MEDICINE

## 2021-03-08 PROCEDURE — 3008F BODY MASS INDEX DOCD: CPT | Mod: CPTII,S$GLB,, | Performed by: INTERNAL MEDICINE

## 2021-03-09 ENCOUNTER — OFFICE VISIT (OUTPATIENT)
Dept: DERMATOLOGY | Facility: CLINIC | Age: 61
End: 2021-03-09
Payer: COMMERCIAL

## 2021-03-09 DIAGNOSIS — Z12.83 SCREENING, MALIGNANT NEOPLASM, SKIN: ICD-10-CM

## 2021-03-09 DIAGNOSIS — L82.1 SEBORRHEIC KERATOSES: ICD-10-CM

## 2021-03-09 DIAGNOSIS — L57.0 ACTINIC KERATOSES: Primary | ICD-10-CM

## 2021-03-09 DIAGNOSIS — L81.4 LENTIGO: ICD-10-CM

## 2021-03-09 DIAGNOSIS — Z00.00 ROUTINE GENERAL MEDICAL EXAMINATION AT A HEALTH CARE FACILITY: ICD-10-CM

## 2021-03-09 PROCEDURE — 1126F PR PAIN SEVERITY QUANTIFIED, NO PAIN PRESENT: ICD-10-PCS | Mod: S$GLB,,, | Performed by: STUDENT IN AN ORGANIZED HEALTH CARE EDUCATION/TRAINING PROGRAM

## 2021-03-09 PROCEDURE — 17003 DESTRUCT PREMALG LES 2-14: CPT | Mod: S$GLB,,, | Performed by: STUDENT IN AN ORGANIZED HEALTH CARE EDUCATION/TRAINING PROGRAM

## 2021-03-09 PROCEDURE — 99203 OFFICE O/P NEW LOW 30 MIN: CPT | Mod: 25,S$GLB,, | Performed by: STUDENT IN AN ORGANIZED HEALTH CARE EDUCATION/TRAINING PROGRAM

## 2021-03-09 PROCEDURE — 99999 PR PBB SHADOW E&M-EST. PATIENT-LVL III: CPT | Mod: PBBFAC,,, | Performed by: STUDENT IN AN ORGANIZED HEALTH CARE EDUCATION/TRAINING PROGRAM

## 2021-03-09 PROCEDURE — 99203 PR OFFICE/OUTPT VISIT, NEW, LEVL III, 30-44 MIN: ICD-10-PCS | Mod: 25,S$GLB,, | Performed by: STUDENT IN AN ORGANIZED HEALTH CARE EDUCATION/TRAINING PROGRAM

## 2021-03-09 PROCEDURE — 17000 PR DESTRUCTION(LASER SURGERY,CRYOSURGERY,CHEMOSURGERY),PREMALIGNANT LESIONS,FIRST LESION: ICD-10-PCS | Mod: S$GLB,,, | Performed by: STUDENT IN AN ORGANIZED HEALTH CARE EDUCATION/TRAINING PROGRAM

## 2021-03-09 PROCEDURE — 17003 DESTRUCTION, PREMALIGNANT LESIONS; SECOND THROUGH 14 LESIONS: ICD-10-PCS | Mod: S$GLB,,, | Performed by: STUDENT IN AN ORGANIZED HEALTH CARE EDUCATION/TRAINING PROGRAM

## 2021-03-09 PROCEDURE — 1126F AMNT PAIN NOTED NONE PRSNT: CPT | Mod: S$GLB,,, | Performed by: STUDENT IN AN ORGANIZED HEALTH CARE EDUCATION/TRAINING PROGRAM

## 2021-03-09 PROCEDURE — 17000 DESTRUCT PREMALG LESION: CPT | Mod: S$GLB,,, | Performed by: STUDENT IN AN ORGANIZED HEALTH CARE EDUCATION/TRAINING PROGRAM

## 2021-03-09 PROCEDURE — 99999 PR PBB SHADOW E&M-EST. PATIENT-LVL III: ICD-10-PCS | Mod: PBBFAC,,, | Performed by: STUDENT IN AN ORGANIZED HEALTH CARE EDUCATION/TRAINING PROGRAM

## 2021-03-12 ENCOUNTER — HOSPITAL ENCOUNTER (OUTPATIENT)
Dept: RADIOLOGY | Facility: HOSPITAL | Age: 61
Discharge: HOME | End: 2021-03-12
Attending: INTERNAL MEDICINE
Payer: COMMERCIAL

## 2021-03-12 ENCOUNTER — LAB VISIT (OUTPATIENT)
Dept: LAB | Facility: HOSPITAL | Age: 61
End: 2021-03-12
Attending: INTERNAL MEDICINE
Payer: COMMERCIAL

## 2021-03-12 DIAGNOSIS — Z12.2 ENCOUNTER FOR SCREENING FOR MALIGNANT NEOPLASM OF RESPIRATORY ORGANS: ICD-10-CM

## 2021-03-12 DIAGNOSIS — Z00.00 ROUTINE GENERAL MEDICAL EXAMINATION AT A HEALTH CARE FACILITY: ICD-10-CM

## 2021-03-12 LAB
ALBUMIN SERPL BCP-MCNC: 3.9 G/DL (ref 3.5–5.2)
ALP SERPL-CCNC: 75 U/L (ref 55–135)
ALT SERPL W/O P-5'-P-CCNC: 11 U/L (ref 10–44)
ANION GAP SERPL CALC-SCNC: 9 MMOL/L (ref 8–16)
AST SERPL-CCNC: 14 U/L (ref 10–40)
BASOPHILS # BLD AUTO: 0.02 K/UL (ref 0–0.2)
BASOPHILS NFR BLD: 0.4 % (ref 0–1.9)
BILIRUB SERPL-MCNC: 0.4 MG/DL (ref 0.1–1)
BUN SERPL-MCNC: 16 MG/DL (ref 8–23)
CALCIUM SERPL-MCNC: 8.8 MG/DL (ref 8.7–10.5)
CHLORIDE SERPL-SCNC: 106 MMOL/L (ref 95–110)
CHOLEST SERPL-MCNC: 174 MG/DL (ref 120–199)
CHOLEST/HDLC SERPL: 3.4 {RATIO} (ref 2–5)
CO2 SERPL-SCNC: 24 MMOL/L (ref 23–29)
CREAT SERPL-MCNC: 0.9 MG/DL (ref 0.5–1.4)
DIFFERENTIAL METHOD: ABNORMAL
EOSINOPHIL # BLD AUTO: 0 K/UL (ref 0–0.5)
EOSINOPHIL NFR BLD: 0.9 % (ref 0–8)
ERYTHROCYTE [DISTWIDTH] IN BLOOD BY AUTOMATED COUNT: 11.9 % (ref 11.5–14.5)
EST. GFR  (AFRICAN AMERICAN): >60 ML/MIN/1.73 M^2
EST. GFR  (NON AFRICAN AMERICAN): >60 ML/MIN/1.73 M^2
ESTIMATED AVG GLUCOSE: 97 MG/DL (ref 68–131)
GLUCOSE SERPL-MCNC: 104 MG/DL (ref 70–110)
HBA1C MFR BLD: 5 % (ref 4–5.6)
HCT VFR BLD AUTO: 43.1 % (ref 37–48.5)
HDLC SERPL-MCNC: 51 MG/DL (ref 40–75)
HDLC SERPL: 29.3 % (ref 20–50)
HGB BLD-MCNC: 14.6 G/DL (ref 12–16)
IMM GRANULOCYTES # BLD AUTO: 0.01 K/UL (ref 0–0.04)
IMM GRANULOCYTES NFR BLD AUTO: 0.2 % (ref 0–0.5)
LDLC SERPL CALC-MCNC: 102.4 MG/DL (ref 63–159)
LYMPHOCYTES # BLD AUTO: 1.2 K/UL (ref 1–4.8)
LYMPHOCYTES NFR BLD: 26.4 % (ref 18–48)
MCH RBC QN AUTO: 35.1 PG (ref 27–31)
MCHC RBC AUTO-ENTMCNC: 33.9 G/DL (ref 32–36)
MCV RBC AUTO: 104 FL (ref 82–98)
MONOCYTES # BLD AUTO: 0.4 K/UL (ref 0.3–1)
MONOCYTES NFR BLD: 7.8 % (ref 4–15)
NEUTROPHILS # BLD AUTO: 2.9 K/UL (ref 1.8–7.7)
NEUTROPHILS NFR BLD: 64.3 % (ref 38–73)
NONHDLC SERPL-MCNC: 123 MG/DL
NRBC BLD-RTO: 0 /100 WBC
PLATELET # BLD AUTO: 196 K/UL (ref 150–350)
PMV BLD AUTO: 10.5 FL (ref 9.2–12.9)
POTASSIUM SERPL-SCNC: 3.9 MMOL/L (ref 3.5–5.1)
PROT SERPL-MCNC: 7.3 G/DL (ref 6–8.4)
RBC # BLD AUTO: 4.16 M/UL (ref 4–5.4)
SODIUM SERPL-SCNC: 139 MMOL/L (ref 136–145)
TRIGL SERPL-MCNC: 103 MG/DL (ref 30–150)
TSH SERPL DL<=0.005 MIU/L-ACNC: 2.75 UIU/ML (ref 0.4–4)
WBC # BLD AUTO: 4.5 K/UL (ref 3.9–12.7)

## 2021-03-12 PROCEDURE — 86703 HIV-1/HIV-2 1 RESULT ANTBDY: CPT | Performed by: INTERNAL MEDICINE

## 2021-03-12 PROCEDURE — 80061 LIPID PANEL: CPT | Performed by: INTERNAL MEDICINE

## 2021-03-12 PROCEDURE — 86803 HEPATITIS C AB TEST: CPT | Performed by: INTERNAL MEDICINE

## 2021-03-12 PROCEDURE — 71271 CT THORAX LUNG CANCER SCR C-: CPT | Mod: 26,,, | Performed by: RADIOLOGY

## 2021-03-12 PROCEDURE — 84443 ASSAY THYROID STIM HORMONE: CPT | Performed by: INTERNAL MEDICINE

## 2021-03-12 PROCEDURE — 71271 CT CHEST LUNG SCREENING LOW DOSE: ICD-10-PCS | Mod: 26,,, | Performed by: RADIOLOGY

## 2021-03-12 PROCEDURE — 71271 CT THORAX LUNG CANCER SCR C-: CPT | Mod: TC

## 2021-03-12 PROCEDURE — 36415 COLL VENOUS BLD VENIPUNCTURE: CPT | Performed by: INTERNAL MEDICINE

## 2021-03-12 PROCEDURE — 85025 COMPLETE CBC W/AUTO DIFF WBC: CPT | Performed by: INTERNAL MEDICINE

## 2021-03-12 PROCEDURE — 80053 COMPREHEN METABOLIC PANEL: CPT | Performed by: INTERNAL MEDICINE

## 2021-03-12 PROCEDURE — 83036 HEMOGLOBIN GLYCOSYLATED A1C: CPT | Performed by: INTERNAL MEDICINE

## 2021-03-15 LAB
HCV AB SERPL QL IA: NEGATIVE
HIV 1+2 AB+HIV1 P24 AG SERPL QL IA: NEGATIVE

## 2021-03-27 ENCOUNTER — IMMUNIZATION (OUTPATIENT)
Dept: HEMATOLOGY/ONCOLOGY | Facility: CLINIC | Age: 61
End: 2021-03-27
Payer: COMMERCIAL

## 2021-03-27 DIAGNOSIS — Z23 NEED FOR VACCINATION: Primary | ICD-10-CM

## 2021-03-27 PROCEDURE — 0012A COVID-19, MRNA, LNP-S, PF, 100 MCG/0.5 ML DOSE VACCINE: CPT | Mod: CV19,S$GLB,, | Performed by: FAMILY MEDICINE

## 2021-03-27 PROCEDURE — 0012A COVID-19, MRNA, LNP-S, PF, 100 MCG/0.5 ML DOSE VACCINE: ICD-10-PCS | Mod: CV19,S$GLB,, | Performed by: FAMILY MEDICINE

## 2021-03-27 PROCEDURE — 91301 COVID-19, MRNA, LNP-S, PF, 100 MCG/0.5 ML DOSE VACCINE: ICD-10-PCS | Mod: S$GLB,,, | Performed by: FAMILY MEDICINE

## 2021-03-27 PROCEDURE — 91301 COVID-19, MRNA, LNP-S, PF, 100 MCG/0.5 ML DOSE VACCINE: CPT | Mod: S$GLB,,, | Performed by: FAMILY MEDICINE

## 2021-04-08 ENCOUNTER — OFFICE VISIT (OUTPATIENT)
Dept: OBSTETRICS AND GYNECOLOGY | Facility: CLINIC | Age: 61
End: 2021-04-08
Payer: COMMERCIAL

## 2021-04-08 VITALS
DIASTOLIC BLOOD PRESSURE: 90 MMHG | SYSTOLIC BLOOD PRESSURE: 138 MMHG | HEIGHT: 65 IN | BODY MASS INDEX: 28.72 KG/M2 | WEIGHT: 172.38 LBS

## 2021-04-08 DIAGNOSIS — N90.89 VULVAL LESION: ICD-10-CM

## 2021-04-08 DIAGNOSIS — Z00.00 ROUTINE GENERAL MEDICAL EXAMINATION AT A HEALTH CARE FACILITY: ICD-10-CM

## 2021-04-08 DIAGNOSIS — Z01.419 ENCOUNTER FOR GYNECOLOGICAL EXAMINATION WITHOUT ABNORMAL FINDING: Primary | ICD-10-CM

## 2021-04-08 PROCEDURE — 99999 PR PBB SHADOW E&M-EST. PATIENT-LVL III: CPT | Mod: PBBFAC,,, | Performed by: NURSE PRACTITIONER

## 2021-04-08 PROCEDURE — 99386 PREV VISIT NEW AGE 40-64: CPT | Mod: S$GLB,,, | Performed by: NURSE PRACTITIONER

## 2021-04-08 PROCEDURE — 99386 PR PREVENTIVE VISIT,NEW,40-64: ICD-10-PCS | Mod: S$GLB,,, | Performed by: NURSE PRACTITIONER

## 2021-04-08 PROCEDURE — 3008F PR BODY MASS INDEX (BMI) DOCUMENTED: ICD-10-PCS | Mod: CPTII,S$GLB,, | Performed by: NURSE PRACTITIONER

## 2021-04-08 PROCEDURE — 3008F BODY MASS INDEX DOCD: CPT | Mod: CPTII,S$GLB,, | Performed by: NURSE PRACTITIONER

## 2021-04-08 PROCEDURE — 1126F AMNT PAIN NOTED NONE PRSNT: CPT | Mod: S$GLB,,, | Performed by: NURSE PRACTITIONER

## 2021-04-08 PROCEDURE — 99999 PR PBB SHADOW E&M-EST. PATIENT-LVL III: ICD-10-PCS | Mod: PBBFAC,,, | Performed by: NURSE PRACTITIONER

## 2021-04-08 PROCEDURE — 1126F PR PAIN SEVERITY QUANTIFIED, NO PAIN PRESENT: ICD-10-PCS | Mod: S$GLB,,, | Performed by: NURSE PRACTITIONER

## 2021-04-09 ENCOUNTER — HOSPITAL ENCOUNTER (OUTPATIENT)
Dept: RADIOLOGY | Facility: HOSPITAL | Age: 61
Discharge: HOME | End: 2021-04-09
Attending: INTERNAL MEDICINE
Payer: COMMERCIAL

## 2021-04-09 DIAGNOSIS — Z12.31 ENCOUNTER FOR SCREENING MAMMOGRAM FOR MALIGNANT NEOPLASM OF BREAST: ICD-10-CM

## 2021-04-09 PROCEDURE — 77067 MAMMO DIGITAL SCREENING BILAT WITH TOMO: ICD-10-PCS | Mod: 26,,, | Performed by: RADIOLOGY

## 2021-04-09 PROCEDURE — 77067 SCR MAMMO BI INCL CAD: CPT | Mod: 26,,, | Performed by: RADIOLOGY

## 2021-04-09 PROCEDURE — 77067 SCR MAMMO BI INCL CAD: CPT | Mod: TC

## 2021-04-09 PROCEDURE — 77063 MAMMO DIGITAL SCREENING BILAT WITH TOMO: ICD-10-PCS | Mod: 26,,, | Performed by: RADIOLOGY

## 2021-04-09 PROCEDURE — 77063 BREAST TOMOSYNTHESIS BI: CPT | Mod: 26,,, | Performed by: RADIOLOGY

## 2021-04-14 ENCOUNTER — PROCEDURE VISIT (OUTPATIENT)
Dept: OBSTETRICS AND GYNECOLOGY | Facility: CLINIC | Age: 61
End: 2021-04-14
Payer: COMMERCIAL

## 2021-04-14 ENCOUNTER — HOSPITAL ENCOUNTER (OUTPATIENT)
Dept: RADIOLOGY | Facility: HOSPITAL | Age: 61
Discharge: HOME | End: 2021-04-14
Attending: INTERNAL MEDICINE
Payer: COMMERCIAL

## 2021-04-14 VITALS
DIASTOLIC BLOOD PRESSURE: 94 MMHG | SYSTOLIC BLOOD PRESSURE: 122 MMHG | WEIGHT: 174.38 LBS | BODY MASS INDEX: 29.05 KG/M2 | HEIGHT: 65 IN

## 2021-04-14 DIAGNOSIS — R92.8 ABNORMAL MAMMOGRAM: ICD-10-CM

## 2021-04-14 DIAGNOSIS — N90.89 VULVAL LESION: Primary | ICD-10-CM

## 2021-04-14 PROCEDURE — 88342 IMHCHEM/IMCYTCHM 1ST ANTB: CPT | Mod: 26,,, | Performed by: PATHOLOGY

## 2021-04-14 PROCEDURE — 56606 PR BX,VULVA/PERINEUM,ADDL LESION: ICD-10-PCS | Mod: S$GLB,,, | Performed by: NURSE PRACTITIONER

## 2021-04-14 PROCEDURE — 88305 TISSUE EXAM BY PATHOLOGIST: ICD-10-PCS | Mod: 26,,, | Performed by: PATHOLOGY

## 2021-04-14 PROCEDURE — 56605 PR BIOPSY VULVA/PERINEUM,ONE LESN: ICD-10-PCS | Mod: S$GLB,,, | Performed by: NURSE PRACTITIONER

## 2021-04-14 PROCEDURE — 76642 US BREAST LEFT LIMITED: ICD-10-PCS | Mod: 26,LT,, | Performed by: RADIOLOGY

## 2021-04-14 PROCEDURE — 88305 TISSUE EXAM BY PATHOLOGIST: CPT | Mod: 26,,, | Performed by: PATHOLOGY

## 2021-04-14 PROCEDURE — 77061 BREAST TOMOSYNTHESIS UNI: CPT | Mod: TC,LT

## 2021-04-14 PROCEDURE — 88342 IMHCHEM/IMCYTCHM 1ST ANTB: CPT | Performed by: PATHOLOGY

## 2021-04-14 PROCEDURE — 88342 CHG IMMUNOCYTOCHEMISTRY: ICD-10-PCS | Mod: 26,,, | Performed by: PATHOLOGY

## 2021-04-14 PROCEDURE — 77065 MAMMO DIGITAL DIAGNOSTIC LEFT WITH TOMO: ICD-10-PCS | Mod: 26,LT,, | Performed by: RADIOLOGY

## 2021-04-14 PROCEDURE — 88342 IMHCHEM/IMCYTCHM 1ST ANTB: CPT | Mod: 59 | Performed by: PATHOLOGY

## 2021-04-14 PROCEDURE — 76642 ULTRASOUND BREAST LIMITED: CPT | Mod: 26,LT,, | Performed by: RADIOLOGY

## 2021-04-14 PROCEDURE — 56605 BIOPSY OF VULVA/PERINEUM: CPT | Mod: S$GLB,,, | Performed by: NURSE PRACTITIONER

## 2021-04-14 PROCEDURE — 56606 BIOPSY OF VULVA/PERINEUM: CPT | Mod: S$GLB,,, | Performed by: NURSE PRACTITIONER

## 2021-04-14 PROCEDURE — 77061 MAMMO DIGITAL DIAGNOSTIC LEFT WITH TOMO: ICD-10-PCS | Mod: 26,LT,, | Performed by: RADIOLOGY

## 2021-04-14 PROCEDURE — 76642 ULTRASOUND BREAST LIMITED: CPT | Mod: TC,LT

## 2021-04-14 PROCEDURE — 88305 TISSUE EXAM BY PATHOLOGIST: CPT | Performed by: PATHOLOGY

## 2021-04-14 PROCEDURE — 77061 BREAST TOMOSYNTHESIS UNI: CPT | Mod: 26,LT,, | Performed by: RADIOLOGY

## 2021-04-14 PROCEDURE — 77065 DX MAMMO INCL CAD UNI: CPT | Mod: 26,LT,, | Performed by: RADIOLOGY

## 2021-04-14 RX ORDER — FLUTICASONE FUROATE, UMECLIDINIUM BROMIDE AND VILANTEROL TRIFENATATE 200; 62.5; 25 UG/1; UG/1; UG/1
1 POWDER RESPIRATORY (INHALATION) DAILY
COMMUNITY
Start: 2021-04-13 | End: 2021-09-21

## 2021-04-19 LAB
FINAL PATHOLOGIC DIAGNOSIS: NORMAL
GROSS: NORMAL
Lab: NORMAL

## 2021-04-20 ENCOUNTER — DOCUMENTATION ONLY (OUTPATIENT)
Dept: OBSTETRICS AND GYNECOLOGY | Facility: CLINIC | Age: 61
End: 2021-04-20

## 2021-04-20 ENCOUNTER — TELEPHONE (OUTPATIENT)
Dept: OBSTETRICS AND GYNECOLOGY | Facility: CLINIC | Age: 61
End: 2021-04-20

## 2021-04-23 ENCOUNTER — PATIENT MESSAGE (OUTPATIENT)
Dept: OBSTETRICS AND GYNECOLOGY | Facility: CLINIC | Age: 61
End: 2021-04-23

## 2021-04-26 ENCOUNTER — TELEPHONE (OUTPATIENT)
Dept: OBSTETRICS AND GYNECOLOGY | Facility: CLINIC | Age: 61
End: 2021-04-26

## 2021-04-26 DIAGNOSIS — D07.1 VIN III (VULVAR INTRAEPITHELIAL NEOPLASIA III): Primary | ICD-10-CM

## 2021-04-26 LAB
COMMENT: NORMAL
FINAL PATHOLOGIC DIAGNOSIS: NORMAL
GROSS: NORMAL
Lab: NORMAL

## 2021-05-06 ENCOUNTER — OFFICE VISIT (OUTPATIENT)
Dept: ALLERGY | Facility: CLINIC | Age: 61
End: 2021-05-06
Payer: COMMERCIAL

## 2021-05-06 VITALS
HEART RATE: 88 BPM | BODY MASS INDEX: 28.91 KG/M2 | WEIGHT: 173.5 LBS | HEIGHT: 65 IN | DIASTOLIC BLOOD PRESSURE: 73 MMHG | TEMPERATURE: 98 F | SYSTOLIC BLOOD PRESSURE: 119 MMHG

## 2021-05-06 DIAGNOSIS — J44.9 CHRONIC OBSTRUCTIVE PULMONARY DISEASE, UNSPECIFIED COPD TYPE: ICD-10-CM

## 2021-05-06 DIAGNOSIS — D83.9 COMMON VARIABLE IMMUNODEFICIENCY: Primary | ICD-10-CM

## 2021-05-06 DIAGNOSIS — J32.9 RECURRENT SINUSITIS: ICD-10-CM

## 2021-05-06 DIAGNOSIS — J40 BRONCHITIS: ICD-10-CM

## 2021-05-06 DIAGNOSIS — E83.119 HEMOCHROMATOSIS, UNSPECIFIED HEMOCHROMATOSIS TYPE: ICD-10-CM

## 2021-05-06 PROCEDURE — 99214 OFFICE O/P EST MOD 30 MIN: CPT | Mod: S$GLB,,, | Performed by: SPECIALIST

## 2021-05-06 PROCEDURE — 99999 PR PBB SHADOW E&M-EST. PATIENT-LVL III: CPT | Mod: PBBFAC,,, | Performed by: SPECIALIST

## 2021-05-06 PROCEDURE — 3008F PR BODY MASS INDEX (BMI) DOCUMENTED: ICD-10-PCS | Mod: CPTII,S$GLB,, | Performed by: SPECIALIST

## 2021-05-06 PROCEDURE — 3008F BODY MASS INDEX DOCD: CPT | Mod: CPTII,S$GLB,, | Performed by: SPECIALIST

## 2021-05-06 PROCEDURE — 99214 PR OFFICE/OUTPT VISIT, EST, LEVL IV, 30-39 MIN: ICD-10-PCS | Mod: S$GLB,,, | Performed by: SPECIALIST

## 2021-05-06 PROCEDURE — 99999 PR PBB SHADOW E&M-EST. PATIENT-LVL III: ICD-10-PCS | Mod: PBBFAC,,, | Performed by: SPECIALIST

## 2021-05-06 RX ORDER — PREDNISONE 10 MG/1
10 TABLET ORAL DAILY PRN
Qty: 30 TABLET | Refills: 0 | Status: SHIPPED | OUTPATIENT
Start: 2021-05-06 | End: 2021-05-28

## 2021-05-10 DIAGNOSIS — F17.210 NICOTINE DEPENDENCE, CIGARETTES, UNCOMPLICATED: ICD-10-CM

## 2021-05-10 RX ORDER — BUPROPION HYDROCHLORIDE 150 MG/1
150 TABLET, EXTENDED RELEASE ORAL 2 TIMES DAILY
Qty: 60 TABLET | Status: SHIPPED | OUTPATIENT
Start: 2021-05-10 | End: 2022-04-12

## 2021-05-19 ENCOUNTER — OFFICE VISIT (OUTPATIENT)
Dept: GYNECOLOGIC ONCOLOGY | Facility: CLINIC | Age: 61
End: 2021-05-19
Payer: COMMERCIAL

## 2021-05-19 VITALS
SYSTOLIC BLOOD PRESSURE: 147 MMHG | DIASTOLIC BLOOD PRESSURE: 86 MMHG | HEIGHT: 65 IN | BODY MASS INDEX: 28.83 KG/M2 | WEIGHT: 173.06 LBS | HEART RATE: 74 BPM

## 2021-05-19 DIAGNOSIS — D83.9 CVID (COMMON VARIABLE IMMUNODEFICIENCY): ICD-10-CM

## 2021-05-19 DIAGNOSIS — A63.0 GENITAL WARTS: ICD-10-CM

## 2021-05-19 DIAGNOSIS — J45.40 MODERATE PERSISTENT ASTHMA WITHOUT COMPLICATION: ICD-10-CM

## 2021-05-19 DIAGNOSIS — Z01.818 PREPROCEDURAL EXAMINATION: Primary | ICD-10-CM

## 2021-05-19 DIAGNOSIS — J44.9 CHRONIC OBSTRUCTIVE PULMONARY DISEASE, UNSPECIFIED COPD TYPE: ICD-10-CM

## 2021-05-19 DIAGNOSIS — E83.119 HEMOCHROMATOSIS, UNSPECIFIED HEMOCHROMATOSIS TYPE: ICD-10-CM

## 2021-05-19 DIAGNOSIS — N90.3 VULVAR DYSPLASIA: Primary | ICD-10-CM

## 2021-05-19 PROCEDURE — 99205 OFFICE O/P NEW HI 60 MIN: CPT | Mod: S$GLB,,, | Performed by: OBSTETRICS & GYNECOLOGY

## 2021-05-19 PROCEDURE — 99999 PR PBB SHADOW E&M-EST. PATIENT-LVL III: ICD-10-PCS | Mod: PBBFAC,,, | Performed by: OBSTETRICS & GYNECOLOGY

## 2021-05-19 PROCEDURE — 3008F BODY MASS INDEX DOCD: CPT | Mod: CPTII,S$GLB,, | Performed by: OBSTETRICS & GYNECOLOGY

## 2021-05-19 PROCEDURE — 99999 PR PBB SHADOW E&M-EST. PATIENT-LVL III: CPT | Mod: PBBFAC,,, | Performed by: OBSTETRICS & GYNECOLOGY

## 2021-05-19 PROCEDURE — 1126F PR PAIN SEVERITY QUANTIFIED, NO PAIN PRESENT: ICD-10-PCS | Mod: S$GLB,,, | Performed by: OBSTETRICS & GYNECOLOGY

## 2021-05-19 PROCEDURE — 99205 PR OFFICE/OUTPT VISIT, NEW, LEVL V, 60-74 MIN: ICD-10-PCS | Mod: S$GLB,,, | Performed by: OBSTETRICS & GYNECOLOGY

## 2021-05-19 PROCEDURE — 1126F AMNT PAIN NOTED NONE PRSNT: CPT | Mod: S$GLB,,, | Performed by: OBSTETRICS & GYNECOLOGY

## 2021-05-19 PROCEDURE — 3008F PR BODY MASS INDEX (BMI) DOCUMENTED: ICD-10-PCS | Mod: CPTII,S$GLB,, | Performed by: OBSTETRICS & GYNECOLOGY

## 2021-05-20 ENCOUNTER — TELEPHONE (OUTPATIENT)
Dept: CARDIOLOGY | Facility: CLINIC | Age: 61
End: 2021-05-20

## 2021-05-20 ENCOUNTER — OFFICE VISIT (OUTPATIENT)
Dept: CARDIOLOGY | Facility: CLINIC | Age: 61
End: 2021-05-20
Payer: COMMERCIAL

## 2021-05-20 ENCOUNTER — HOSPITAL ENCOUNTER (OUTPATIENT)
Dept: CARDIOLOGY | Facility: HOSPITAL | Age: 61
Discharge: HOME | End: 2021-05-20
Attending: OBSTETRICS & GYNECOLOGY
Payer: COMMERCIAL

## 2021-05-20 ENCOUNTER — PATIENT MESSAGE (OUTPATIENT)
Dept: CARDIOLOGY | Facility: CLINIC | Age: 61
End: 2021-05-20

## 2021-05-20 VITALS
HEART RATE: 67 BPM | OXYGEN SATURATION: 100 % | DIASTOLIC BLOOD PRESSURE: 86 MMHG | SYSTOLIC BLOOD PRESSURE: 152 MMHG | WEIGHT: 173 LBS | BODY MASS INDEX: 28.79 KG/M2

## 2021-05-20 DIAGNOSIS — I10 ESSENTIAL HYPERTENSION: Primary | ICD-10-CM

## 2021-05-20 DIAGNOSIS — N90.3 VULVAR DYSPLASIA: ICD-10-CM

## 2021-05-20 DIAGNOSIS — J44.9 CHRONIC OBSTRUCTIVE PULMONARY DISEASE, UNSPECIFIED COPD TYPE: ICD-10-CM

## 2021-05-20 DIAGNOSIS — E78.5 HYPERLIPIDEMIA, UNSPECIFIED HYPERLIPIDEMIA TYPE: ICD-10-CM

## 2021-05-20 DIAGNOSIS — E83.119 HEMOCHROMATOSIS, UNSPECIFIED HEMOCHROMATOSIS TYPE: ICD-10-CM

## 2021-05-20 DIAGNOSIS — A63.0 GENITAL WARTS: ICD-10-CM

## 2021-05-20 PROCEDURE — 93010 EKG 12-LEAD: ICD-10-PCS | Mod: ,,, | Performed by: INTERNAL MEDICINE

## 2021-05-20 PROCEDURE — 3008F BODY MASS INDEX DOCD: CPT | Mod: CPTII,S$GLB,, | Performed by: INTERNAL MEDICINE

## 2021-05-20 PROCEDURE — 99205 OFFICE O/P NEW HI 60 MIN: CPT | Mod: S$GLB,,, | Performed by: INTERNAL MEDICINE

## 2021-05-20 PROCEDURE — 93005 ELECTROCARDIOGRAM TRACING: CPT

## 2021-05-20 PROCEDURE — 93010 ELECTROCARDIOGRAM REPORT: CPT | Mod: ,,, | Performed by: INTERNAL MEDICINE

## 2021-05-20 PROCEDURE — 3008F PR BODY MASS INDEX (BMI) DOCUMENTED: ICD-10-PCS | Mod: CPTII,S$GLB,, | Performed by: INTERNAL MEDICINE

## 2021-05-20 PROCEDURE — 99205 PR OFFICE/OUTPT VISIT, NEW, LEVL V, 60-74 MIN: ICD-10-PCS | Mod: S$GLB,,, | Performed by: INTERNAL MEDICINE

## 2021-05-20 PROCEDURE — 99999 PR PBB SHADOW E&M-EST. PATIENT-LVL III: CPT | Mod: PBBFAC,,, | Performed by: INTERNAL MEDICINE

## 2021-05-20 PROCEDURE — 99999 PR PBB SHADOW E&M-EST. PATIENT-LVL III: ICD-10-PCS | Mod: PBBFAC,,, | Performed by: INTERNAL MEDICINE

## 2021-05-20 RX ORDER — HYDROCHLOROTHIAZIDE 12.5 MG/1
12.5 TABLET ORAL DAILY
Qty: 30 TABLET | Refills: 11 | Status: SHIPPED | OUTPATIENT
Start: 2021-05-20 | End: 2022-05-09

## 2021-06-15 ENCOUNTER — LAB VISIT (OUTPATIENT)
Dept: LAB | Facility: HOSPITAL | Age: 61
End: 2021-06-15
Attending: INTERNAL MEDICINE
Payer: COMMERCIAL

## 2021-06-15 ENCOUNTER — HOSPITAL ENCOUNTER (OUTPATIENT)
Dept: CARDIOLOGY | Facility: HOSPITAL | Age: 61
Discharge: HOME | End: 2021-06-15
Attending: INTERNAL MEDICINE
Payer: COMMERCIAL

## 2021-06-15 VITALS
BODY MASS INDEX: 28.82 KG/M2 | SYSTOLIC BLOOD PRESSURE: 152 MMHG | HEIGHT: 65 IN | WEIGHT: 173 LBS | DIASTOLIC BLOOD PRESSURE: 86 MMHG

## 2021-06-15 DIAGNOSIS — I10 ESSENTIAL HYPERTENSION: ICD-10-CM

## 2021-06-15 DIAGNOSIS — E83.119 HEMOCHROMATOSIS, UNSPECIFIED HEMOCHROMATOSIS TYPE: ICD-10-CM

## 2021-06-15 DIAGNOSIS — Z01.818 PREPROCEDURAL EXAMINATION: ICD-10-CM

## 2021-06-15 LAB
AORTIC ROOT ANNULUS: 3.54 CM
ASCENDING AORTA: 4.05 CM
AV INDEX (PROSTH): 0.91
AV MEAN GRADIENT: 3 MMHG
AV PEAK GRADIENT: 6 MMHG
AV VALVE AREA: 3.78 CM2
AV VELOCITY RATIO: 0.98
BASOPHILS # BLD AUTO: 0.03 K/UL (ref 0–0.2)
BASOPHILS NFR BLD: 0.6 % (ref 0–1.9)
BSA FOR ECHO PROCEDURE: 1.9 M2
CV ECHO LV RWT: 0.47 CM
DIFFERENTIAL METHOD: ABNORMAL
DOP CALC AO PEAK VEL: 1.21 M/S
DOP CALC AO VTI: 25.43 CM
DOP CALC LVOT AREA: 4.2 CM2
DOP CALC LVOT DIAMETER: 2.3 CM
DOP CALC LVOT PEAK VEL: 1.18 M/S
DOP CALC LVOT STROKE VOLUME: 96.01 CM3
DOP CALC RVOT PEAK VEL: 0.75 M/S
DOP CALC RVOT VTI: 17.01 CM
DOP CALCLVOT PEAK VEL VTI: 23.12 CM
E WAVE DECELERATION TIME: 251.24 MSEC
E/A RATIO: 0.91
E/E' RATIO: 8.13 M/S
ECHO LV POSTERIOR WALL: 1.08 CM (ref 0.6–1.1)
EJECTION FRACTION: 65 %
EOSINOPHIL # BLD AUTO: 0 K/UL (ref 0–0.5)
EOSINOPHIL NFR BLD: 0.8 % (ref 0–8)
ERYTHROCYTE [DISTWIDTH] IN BLOOD BY AUTOMATED COUNT: 12.3 % (ref 11.5–14.5)
FRACTIONAL SHORTENING: 37 % (ref 28–44)
HCT VFR BLD AUTO: 41 % (ref 37–48.5)
HGB BLD-MCNC: 14.1 G/DL (ref 12–16)
IMM GRANULOCYTES # BLD AUTO: 0.01 K/UL (ref 0–0.04)
IMM GRANULOCYTES NFR BLD AUTO: 0.2 % (ref 0–0.5)
INTERVENTRICULAR SEPTUM: 0.92 CM (ref 0.6–1.1)
IVRT: 79.92 MSEC
LA MAJOR: 5.05 CM
LA MINOR: 4.58 CM
LA WIDTH: 3.22 CM
LEFT ATRIUM SIZE: 3.46 CM
LEFT ATRIUM VOLUME INDEX: 24.5 ML/M2
LEFT ATRIUM VOLUME: 45.49 CM3
LEFT INTERNAL DIMENSION IN SYSTOLE: 2.87 CM (ref 2.1–4)
LEFT VENTRICLE DIASTOLIC VOLUME INDEX: 51.75 ML/M2
LEFT VENTRICLE DIASTOLIC VOLUME: 96.26 ML
LEFT VENTRICLE MASS INDEX: 85 G/M2
LEFT VENTRICLE SYSTOLIC VOLUME INDEX: 16.9 ML/M2
LEFT VENTRICLE SYSTOLIC VOLUME: 31.35 ML
LEFT VENTRICULAR INTERNAL DIMENSION IN DIASTOLE: 4.58 CM (ref 3.5–6)
LEFT VENTRICULAR MASS: 157.7 G
LV LATERAL E/E' RATIO: 6.78 M/S
LV SEPTAL E/E' RATIO: 10.17 M/S
LYMPHOCYTES # BLD AUTO: 1.5 K/UL (ref 1–4.8)
LYMPHOCYTES NFR BLD: 29.1 % (ref 18–48)
MCH RBC QN AUTO: 35.3 PG (ref 27–31)
MCHC RBC AUTO-ENTMCNC: 34.4 G/DL (ref 32–36)
MCV RBC AUTO: 103 FL (ref 82–98)
MONOCYTES # BLD AUTO: 0.4 K/UL (ref 0.3–1)
MONOCYTES NFR BLD: 7.6 % (ref 4–15)
MV PEAK A VEL: 0.67 M/S
MV PEAK E VEL: 0.61 M/S
MV STENOSIS PRESSURE HALF TIME: 72.86 MS
MV VALVE AREA P 1/2 METHOD: 3.02 CM2
NEUTROPHILS # BLD AUTO: 3.2 K/UL (ref 1.8–7.7)
NEUTROPHILS NFR BLD: 61.7 % (ref 38–73)
NRBC BLD-RTO: 0 /100 WBC
PLATELET # BLD AUTO: 229 K/UL (ref 150–450)
PMV BLD AUTO: 10.6 FL (ref 9.2–12.9)
PULM VEIN S/D RATIO: 1.31
PV MEAN GRADIENT: 1 MMHG
PV PEAK D VEL: 0.42 M/S
PV PEAK S VEL: 0.55 M/S
PV PEAK VELOCITY: 0.88 CM/S
RA MAJOR: 4.8 CM
RA PRESSURE: 3 MMHG
RA WIDTH: 3.51 CM
RBC # BLD AUTO: 4 M/UL (ref 4–5.4)
RIGHT VENTRICULAR END-DIASTOLIC DIMENSION: 2.91 CM
SINUS: 3.71 CM
STJ: 3.79 CM
TDI LATERAL: 0.09 M/S
TDI SEPTAL: 0.06 M/S
TDI: 0.08 M/S
TRICUSPID ANNULAR PLANE SYSTOLIC EXCURSION: 1.66 CM
WBC # BLD AUTO: 5.23 K/UL (ref 3.9–12.7)

## 2021-06-15 PROCEDURE — 85025 COMPLETE CBC W/AUTO DIFF WBC: CPT | Performed by: OBSTETRICS & GYNECOLOGY

## 2021-06-15 PROCEDURE — 36415 COLL VENOUS BLD VENIPUNCTURE: CPT | Performed by: OBSTETRICS & GYNECOLOGY

## 2021-06-15 PROCEDURE — 80048 BASIC METABOLIC PNL TOTAL CA: CPT | Performed by: OBSTETRICS & GYNECOLOGY

## 2021-06-15 PROCEDURE — 93306 TTE W/DOPPLER COMPLETE: CPT | Mod: 26,,, | Performed by: INTERNAL MEDICINE

## 2021-06-15 PROCEDURE — 93306 ECHO (CUPID ONLY): ICD-10-PCS | Mod: 26,,, | Performed by: INTERNAL MEDICINE

## 2021-06-15 PROCEDURE — 93306 TTE W/DOPPLER COMPLETE: CPT

## 2021-06-16 ENCOUNTER — ANESTHESIA (OUTPATIENT)
Dept: SURGERY | Facility: HOSPITAL | Age: 61
End: 2021-06-16
Payer: COMMERCIAL

## 2021-06-16 ENCOUNTER — ANESTHESIA EVENT (OUTPATIENT)
Dept: SURGERY | Facility: HOSPITAL | Age: 61
End: 2021-06-16
Payer: COMMERCIAL

## 2021-06-16 ENCOUNTER — HOSPITAL ENCOUNTER (OUTPATIENT)
Facility: HOSPITAL | Age: 61
Discharge: HOME | End: 2021-06-16
Attending: OBSTETRICS & GYNECOLOGY | Admitting: OBSTETRICS & GYNECOLOGY
Payer: COMMERCIAL

## 2021-06-16 DIAGNOSIS — N90.3 VULVAR DYSPLASIA: Primary | ICD-10-CM

## 2021-06-16 LAB
ANION GAP SERPL CALC-SCNC: 14 MMOL/L (ref 8–16)
BUN SERPL-MCNC: 12 MG/DL (ref 8–23)
CALCIUM SERPL-MCNC: 10 MG/DL (ref 8.7–10.5)
CHLORIDE SERPL-SCNC: 106 MMOL/L (ref 95–110)
CO2 SERPL-SCNC: 19 MMOL/L (ref 23–29)
CREAT SERPL-MCNC: 0.9 MG/DL (ref 0.5–1.4)
EST. GFR  (AFRICAN AMERICAN): >60 ML/MIN/1.73 M^2
EST. GFR  (NON AFRICAN AMERICAN): >60 ML/MIN/1.73 M^2
GLUCOSE SERPL-MCNC: 112 MG/DL (ref 70–110)
POTASSIUM SERPL-SCNC: 4 MMOL/L (ref 3.5–5.1)
SODIUM SERPL-SCNC: 139 MMOL/L (ref 136–145)

## 2021-06-16 PROCEDURE — 71000015 HC POSTOP RECOV 1ST HR: Performed by: OBSTETRICS & GYNECOLOGY

## 2021-06-16 PROCEDURE — 36000707: Performed by: OBSTETRICS & GYNECOLOGY

## 2021-06-16 PROCEDURE — 56515 PR DESTRUCTION,LESION(S),VULVA;EXTENSIVE: ICD-10-PCS | Mod: ,,, | Performed by: OBSTETRICS & GYNECOLOGY

## 2021-06-16 PROCEDURE — 37000008 HC ANESTHESIA 1ST 15 MINUTES: Performed by: OBSTETRICS & GYNECOLOGY

## 2021-06-16 PROCEDURE — 63600175 PHARM REV CODE 636 W HCPCS: Performed by: NURSE ANESTHETIST, CERTIFIED REGISTERED

## 2021-06-16 PROCEDURE — 25000003 PHARM REV CODE 250: Performed by: NURSE ANESTHETIST, CERTIFIED REGISTERED

## 2021-06-16 PROCEDURE — 36000706: Performed by: OBSTETRICS & GYNECOLOGY

## 2021-06-16 PROCEDURE — 37000009 HC ANESTHESIA EA ADD 15 MINS: Performed by: OBSTETRICS & GYNECOLOGY

## 2021-06-16 PROCEDURE — 25000003 PHARM REV CODE 250: Performed by: OBSTETRICS & GYNECOLOGY

## 2021-06-16 PROCEDURE — 71000033 HC RECOVERY, INTIAL HOUR: Performed by: OBSTETRICS & GYNECOLOGY

## 2021-06-16 PROCEDURE — 25000003 PHARM REV CODE 250: Performed by: ANESTHESIOLOGY

## 2021-06-16 PROCEDURE — 56515 DESTROY VULVA LESION/S COMPL: CPT | Mod: ,,, | Performed by: OBSTETRICS & GYNECOLOGY

## 2021-06-16 RX ORDER — ONDANSETRON 2 MG/ML
INJECTION INTRAMUSCULAR; INTRAVENOUS
Status: DISCONTINUED | OUTPATIENT
Start: 2021-06-16 | End: 2021-06-16

## 2021-06-16 RX ORDER — ONDANSETRON 2 MG/ML
4 INJECTION INTRAMUSCULAR; INTRAVENOUS EVERY 4 HOURS PRN
Status: DISCONTINUED | OUTPATIENT
Start: 2021-06-16 | End: 2021-06-16 | Stop reason: HOSPADM

## 2021-06-16 RX ORDER — ACETAMINOPHEN 500 MG
1000 TABLET ORAL EVERY 6 HOURS PRN
Status: DISCONTINUED | OUTPATIENT
Start: 2021-06-16 | End: 2021-06-16 | Stop reason: HOSPADM

## 2021-06-16 RX ORDER — SODIUM CHLORIDE, SODIUM LACTATE, POTASSIUM CHLORIDE, CALCIUM CHLORIDE 600; 310; 30; 20 MG/100ML; MG/100ML; MG/100ML; MG/100ML
INJECTION, SOLUTION INTRAVENOUS CONTINUOUS PRN
Status: DISCONTINUED | OUTPATIENT
Start: 2021-06-16 | End: 2021-06-16

## 2021-06-16 RX ORDER — MIDAZOLAM HYDROCHLORIDE 1 MG/ML
INJECTION INTRAMUSCULAR; INTRAVENOUS
Status: DISCONTINUED | OUTPATIENT
Start: 2021-06-16 | End: 2021-06-16

## 2021-06-16 RX ORDER — BUPIVACAINE HYDROCHLORIDE AND EPINEPHRINE 2.5; 5 MG/ML; UG/ML
30 INJECTION, SOLUTION EPIDURAL; INFILTRATION; INTRACAUDAL; PERINEURAL ONCE
Status: DISCONTINUED | OUTPATIENT
Start: 2021-06-16 | End: 2021-06-16 | Stop reason: HOSPADM

## 2021-06-16 RX ORDER — ACETAMINOPHEN 500 MG
1000 TABLET ORAL EVERY 6 HOURS PRN
Qty: 100 TABLET | Refills: 2 | COMMUNITY
Start: 2021-06-16 | End: 2021-10-07 | Stop reason: SDUPTHER

## 2021-06-16 RX ORDER — DEXAMETHASONE SODIUM PHOSPHATE 4 MG/ML
INJECTION, SOLUTION INTRA-ARTICULAR; INTRALESIONAL; INTRAMUSCULAR; INTRAVENOUS; SOFT TISSUE
Status: DISCONTINUED | OUTPATIENT
Start: 2021-06-16 | End: 2021-06-16

## 2021-06-16 RX ORDER — PROPOFOL 10 MG/ML
VIAL (ML) INTRAVENOUS CONTINUOUS PRN
Status: DISCONTINUED | OUTPATIENT
Start: 2021-06-16 | End: 2021-06-16

## 2021-06-16 RX ORDER — OXYCODONE AND ACETAMINOPHEN 5; 325 MG/1; MG/1
1 TABLET ORAL
Status: DISCONTINUED | OUTPATIENT
Start: 2021-06-16 | End: 2021-06-16 | Stop reason: HOSPADM

## 2021-06-16 RX ORDER — HYDROMORPHONE HYDROCHLORIDE 2 MG/ML
0.2 INJECTION, SOLUTION INTRAMUSCULAR; INTRAVENOUS; SUBCUTANEOUS
Status: DISCONTINUED | OUTPATIENT
Start: 2021-06-16 | End: 2021-06-16 | Stop reason: HOSPADM

## 2021-06-16 RX ORDER — PROPOFOL 10 MG/ML
VIAL (ML) INTRAVENOUS
Status: DISCONTINUED | OUTPATIENT
Start: 2021-06-16 | End: 2021-06-16

## 2021-06-16 RX ORDER — SILVER SULFADIAZINE 10 G/1000G
CREAM TOPICAL DAILY
Status: DISCONTINUED | OUTPATIENT
Start: 2021-06-16 | End: 2021-06-16 | Stop reason: HOSPADM

## 2021-06-16 RX ORDER — IBUPROFEN 600 MG/1
600 TABLET ORAL EVERY 6 HOURS PRN
Status: ON HOLD | COMMUNITY
Start: 2021-06-16 | End: 2021-12-30 | Stop reason: HOSPADM

## 2021-06-16 RX ORDER — LIDOCAINE HYDROCHLORIDE 10 MG/ML
INJECTION, SOLUTION EPIDURAL; INFILTRATION; INTRACAUDAL; PERINEURAL
Status: DISCONTINUED | OUTPATIENT
Start: 2021-06-16 | End: 2021-06-16

## 2021-06-16 RX ORDER — HYDROMORPHONE HYDROCHLORIDE 2 MG/ML
0.2 INJECTION, SOLUTION INTRAMUSCULAR; INTRAVENOUS; SUBCUTANEOUS EVERY 5 MIN PRN
Status: DISCONTINUED | OUTPATIENT
Start: 2021-06-16 | End: 2021-06-16 | Stop reason: HOSPADM

## 2021-06-16 RX ORDER — KETOROLAC TROMETHAMINE 30 MG/ML
15 INJECTION, SOLUTION INTRAMUSCULAR; INTRAVENOUS EVERY 6 HOURS PRN
Status: DISCONTINUED | OUTPATIENT
Start: 2021-06-16 | End: 2021-06-16 | Stop reason: HOSPADM

## 2021-06-16 RX ORDER — OXYCODONE HYDROCHLORIDE 5 MG/1
5 TABLET ORAL EVERY 4 HOURS PRN
Status: DISCONTINUED | OUTPATIENT
Start: 2021-06-16 | End: 2021-06-16 | Stop reason: HOSPADM

## 2021-06-16 RX ORDER — KETOROLAC TROMETHAMINE 30 MG/ML
15 INJECTION, SOLUTION INTRAMUSCULAR; INTRAVENOUS EVERY 8 HOURS PRN
Status: DISCONTINUED | OUTPATIENT
Start: 2021-06-16 | End: 2021-06-16 | Stop reason: HOSPADM

## 2021-06-16 RX ORDER — SILVER SULFADIAZINE 10 G/1000G
CREAM TOPICAL
Qty: 50 G | Refills: 1 | Status: SHIPPED | OUTPATIENT
Start: 2021-06-16 | End: 2021-11-23

## 2021-06-16 RX ORDER — ONDANSETRON 2 MG/ML
4 INJECTION INTRAMUSCULAR; INTRAVENOUS DAILY PRN
Status: DISCONTINUED | OUTPATIENT
Start: 2021-06-16 | End: 2021-06-16 | Stop reason: HOSPADM

## 2021-06-16 RX ORDER — FENTANYL CITRATE 50 UG/ML
INJECTION, SOLUTION INTRAMUSCULAR; INTRAVENOUS
Status: DISCONTINUED | OUTPATIENT
Start: 2021-06-16 | End: 2021-06-16

## 2021-06-16 RX ADMIN — ONDANSETRON 4 MG: 2 INJECTION, SOLUTION INTRAMUSCULAR; INTRAVENOUS at 07:06

## 2021-06-16 RX ADMIN — SODIUM CHLORIDE, SODIUM LACTATE, POTASSIUM CHLORIDE, AND CALCIUM CHLORIDE: 600; 310; 30; 20 INJECTION, SOLUTION INTRAVENOUS at 07:06

## 2021-06-16 RX ADMIN — OXYCODONE HYDROCHLORIDE AND ACETAMINOPHEN 1 TABLET: 5; 325 TABLET ORAL at 08:06

## 2021-06-16 RX ADMIN — FENTANYL CITRATE 25 MCG: 50 INJECTION, SOLUTION INTRAMUSCULAR; INTRAVENOUS at 07:06

## 2021-06-16 RX ADMIN — MIDAZOLAM HYDROCHLORIDE 2 MG: 1 INJECTION, SOLUTION INTRAMUSCULAR; INTRAVENOUS at 07:06

## 2021-06-16 RX ADMIN — LIDOCAINE HYDROCHLORIDE 100 MG: 10 INJECTION, SOLUTION EPIDURAL; INFILTRATION; INTRACAUDAL; PERINEURAL at 07:06

## 2021-06-16 RX ADMIN — SILVER SULFADIAZINE: 10 CREAM TOPICAL at 07:06

## 2021-06-16 RX ADMIN — PROPOFOL 100 MCG/KG/MIN: 10 INJECTION, EMULSION INTRAVENOUS at 07:06

## 2021-06-16 RX ADMIN — DEXAMETHASONE SODIUM PHOSPHATE 4 MG: 4 INJECTION, SOLUTION INTRAMUSCULAR; INTRAVENOUS at 07:06

## 2021-06-16 RX ADMIN — PROPOFOL 150 MG: 10 INJECTION, EMULSION INTRAVENOUS at 07:06

## 2021-06-21 VITALS
HEART RATE: 56 BPM | BODY MASS INDEX: 27.95 KG/M2 | DIASTOLIC BLOOD PRESSURE: 70 MMHG | SYSTOLIC BLOOD PRESSURE: 145 MMHG | HEIGHT: 65 IN | TEMPERATURE: 97 F | WEIGHT: 167.75 LBS | RESPIRATION RATE: 20 BRPM | OXYGEN SATURATION: 98 %

## 2021-07-20 ENCOUNTER — TELEPHONE (OUTPATIENT)
Dept: ALLERGY | Facility: CLINIC | Age: 61
End: 2021-07-20

## 2021-07-20 ENCOUNTER — PATIENT MESSAGE (OUTPATIENT)
Dept: ALLERGY | Facility: CLINIC | Age: 61
End: 2021-07-20

## 2021-08-05 ENCOUNTER — OFFICE VISIT (OUTPATIENT)
Dept: ALLERGY | Facility: CLINIC | Age: 61
End: 2021-08-05
Payer: COMMERCIAL

## 2021-08-05 VITALS
WEIGHT: 171.06 LBS | SYSTOLIC BLOOD PRESSURE: 146 MMHG | HEART RATE: 81 BPM | BODY MASS INDEX: 28.47 KG/M2 | DIASTOLIC BLOOD PRESSURE: 87 MMHG | TEMPERATURE: 98 F

## 2021-08-05 DIAGNOSIS — D83.9 COMMON VARIABLE IMMUNODEFICIENCY: Primary | ICD-10-CM

## 2021-08-05 DIAGNOSIS — J32.9 RECURRENT SINUSITIS: ICD-10-CM

## 2021-08-05 DIAGNOSIS — J44.89 ASTHMA-COPD OVERLAP SYNDROME: ICD-10-CM

## 2021-08-05 DIAGNOSIS — J40 BRONCHITIS: ICD-10-CM

## 2021-08-05 DIAGNOSIS — J44.9 CHRONIC OBSTRUCTIVE PULMONARY DISEASE, UNSPECIFIED COPD TYPE: ICD-10-CM

## 2021-08-05 PROCEDURE — 1159F MED LIST DOCD IN RCRD: CPT | Mod: CPTII,S$GLB,, | Performed by: SPECIALIST

## 2021-08-05 PROCEDURE — 1126F AMNT PAIN NOTED NONE PRSNT: CPT | Mod: CPTII,S$GLB,, | Performed by: SPECIALIST

## 2021-08-05 PROCEDURE — 99999 PR PBB SHADOW E&M-EST. PATIENT-LVL III: CPT | Mod: PBBFAC,,, | Performed by: SPECIALIST

## 2021-08-05 PROCEDURE — 1126F PR PAIN SEVERITY QUANTIFIED, NO PAIN PRESENT: ICD-10-PCS | Mod: CPTII,S$GLB,, | Performed by: SPECIALIST

## 2021-08-05 PROCEDURE — 3044F PR MOST RECENT HEMOGLOBIN A1C LEVEL <7.0%: ICD-10-PCS | Mod: CPTII,S$GLB,, | Performed by: SPECIALIST

## 2021-08-05 PROCEDURE — 3079F DIAST BP 80-89 MM HG: CPT | Mod: CPTII,S$GLB,, | Performed by: SPECIALIST

## 2021-08-05 PROCEDURE — 99214 OFFICE O/P EST MOD 30 MIN: CPT | Mod: S$GLB,,, | Performed by: SPECIALIST

## 2021-08-05 PROCEDURE — 99214 PR OFFICE/OUTPT VISIT, EST, LEVL IV, 30-39 MIN: ICD-10-PCS | Mod: S$GLB,,, | Performed by: SPECIALIST

## 2021-08-05 PROCEDURE — 3008F BODY MASS INDEX DOCD: CPT | Mod: CPTII,S$GLB,, | Performed by: SPECIALIST

## 2021-08-05 PROCEDURE — 3044F HG A1C LEVEL LT 7.0%: CPT | Mod: CPTII,S$GLB,, | Performed by: SPECIALIST

## 2021-08-05 PROCEDURE — 3008F PR BODY MASS INDEX (BMI) DOCUMENTED: ICD-10-PCS | Mod: CPTII,S$GLB,, | Performed by: SPECIALIST

## 2021-08-05 PROCEDURE — 3077F SYST BP >= 140 MM HG: CPT | Mod: CPTII,S$GLB,, | Performed by: SPECIALIST

## 2021-08-05 PROCEDURE — 1160F PR REVIEW ALL MEDS BY PRESCRIBER/CLIN PHARMACIST DOCUMENTED: ICD-10-PCS | Mod: CPTII,S$GLB,, | Performed by: SPECIALIST

## 2021-08-05 PROCEDURE — 1159F PR MEDICATION LIST DOCUMENTED IN MEDICAL RECORD: ICD-10-PCS | Mod: CPTII,S$GLB,, | Performed by: SPECIALIST

## 2021-08-05 PROCEDURE — 3077F PR MOST RECENT SYSTOLIC BLOOD PRESSURE >= 140 MM HG: ICD-10-PCS | Mod: CPTII,S$GLB,, | Performed by: SPECIALIST

## 2021-08-05 PROCEDURE — 99999 PR PBB SHADOW E&M-EST. PATIENT-LVL III: ICD-10-PCS | Mod: PBBFAC,,, | Performed by: SPECIALIST

## 2021-08-05 PROCEDURE — 1160F RVW MEDS BY RX/DR IN RCRD: CPT | Mod: CPTII,S$GLB,, | Performed by: SPECIALIST

## 2021-08-05 PROCEDURE — 3079F PR MOST RECENT DIASTOLIC BLOOD PRESSURE 80-89 MM HG: ICD-10-PCS | Mod: CPTII,S$GLB,, | Performed by: SPECIALIST

## 2021-08-05 RX ORDER — AMOXICILLIN AND CLAVULANATE POTASSIUM 875; 125 MG/1; MG/1
1 TABLET, FILM COATED ORAL EVERY 12 HOURS
Qty: 20 TABLET | Refills: 4 | Status: SHIPPED | OUTPATIENT
Start: 2021-08-05 | End: 2021-08-15

## 2021-09-13 ENCOUNTER — TELEPHONE (OUTPATIENT)
Dept: GYNECOLOGIC ONCOLOGY | Facility: CLINIC | Age: 61
End: 2021-09-13

## 2021-10-07 ENCOUNTER — HOSPITAL ENCOUNTER (OUTPATIENT)
Dept: RADIOLOGY | Facility: HOSPITAL | Age: 61
Discharge: HOME | End: 2021-10-07
Attending: ANESTHESIOLOGY
Payer: COMMERCIAL

## 2021-10-07 ENCOUNTER — OFFICE VISIT (OUTPATIENT)
Dept: PAIN MEDICINE | Facility: CLINIC | Age: 61
End: 2021-10-07
Payer: COMMERCIAL

## 2021-10-07 VITALS
HEIGHT: 65 IN | BODY MASS INDEX: 27.85 KG/M2 | SYSTOLIC BLOOD PRESSURE: 135 MMHG | DIASTOLIC BLOOD PRESSURE: 87 MMHG | HEART RATE: 64 BPM | WEIGHT: 167.13 LBS

## 2021-10-07 DIAGNOSIS — M54.16 LUMBAR RADICULOPATHY: ICD-10-CM

## 2021-10-07 DIAGNOSIS — M54.9 DORSALGIA, UNSPECIFIED: ICD-10-CM

## 2021-10-07 DIAGNOSIS — M47.816 LUMBAR FACET ARTHROPATHY: Primary | ICD-10-CM

## 2021-10-07 DIAGNOSIS — M47.816 LUMBAR SPONDYLOSIS: ICD-10-CM

## 2021-10-07 PROCEDURE — 99999 PR PBB SHADOW E&M-EST. PATIENT-LVL III: ICD-10-PCS | Mod: PBBFAC,,, | Performed by: ANESTHESIOLOGY

## 2021-10-07 PROCEDURE — 3075F PR MOST RECENT SYSTOLIC BLOOD PRESS GE 130-139MM HG: ICD-10-PCS | Mod: CPTII,S$GLB,, | Performed by: ANESTHESIOLOGY

## 2021-10-07 PROCEDURE — 1159F MED LIST DOCD IN RCRD: CPT | Mod: CPTII,S$GLB,, | Performed by: ANESTHESIOLOGY

## 2021-10-07 PROCEDURE — 3075F SYST BP GE 130 - 139MM HG: CPT | Mod: CPTII,S$GLB,, | Performed by: ANESTHESIOLOGY

## 2021-10-07 PROCEDURE — 99204 OFFICE O/P NEW MOD 45 MIN: CPT | Mod: S$GLB,,, | Performed by: ANESTHESIOLOGY

## 2021-10-07 PROCEDURE — 72110 XR LUMBAR SPINE 5 VIEW WITH FLEX AND EXT: ICD-10-PCS | Mod: 26,,, | Performed by: RADIOLOGY

## 2021-10-07 PROCEDURE — 1160F RVW MEDS BY RX/DR IN RCRD: CPT | Mod: CPTII,S$GLB,, | Performed by: ANESTHESIOLOGY

## 2021-10-07 PROCEDURE — 72110 X-RAY EXAM L-2 SPINE 4/>VWS: CPT | Mod: TC

## 2021-10-07 PROCEDURE — 3008F BODY MASS INDEX DOCD: CPT | Mod: CPTII,S$GLB,, | Performed by: ANESTHESIOLOGY

## 2021-10-07 PROCEDURE — 1160F PR REVIEW ALL MEDS BY PRESCRIBER/CLIN PHARMACIST DOCUMENTED: ICD-10-PCS | Mod: CPTII,S$GLB,, | Performed by: ANESTHESIOLOGY

## 2021-10-07 PROCEDURE — 3044F PR MOST RECENT HEMOGLOBIN A1C LEVEL <7.0%: ICD-10-PCS | Mod: CPTII,S$GLB,, | Performed by: ANESTHESIOLOGY

## 2021-10-07 PROCEDURE — 99204 PR OFFICE/OUTPT VISIT, NEW, LEVL IV, 45-59 MIN: ICD-10-PCS | Mod: S$GLB,,, | Performed by: ANESTHESIOLOGY

## 2021-10-07 PROCEDURE — 1159F PR MEDICATION LIST DOCUMENTED IN MEDICAL RECORD: ICD-10-PCS | Mod: CPTII,S$GLB,, | Performed by: ANESTHESIOLOGY

## 2021-10-07 PROCEDURE — 3008F PR BODY MASS INDEX (BMI) DOCUMENTED: ICD-10-PCS | Mod: CPTII,S$GLB,, | Performed by: ANESTHESIOLOGY

## 2021-10-07 PROCEDURE — 72110 X-RAY EXAM L-2 SPINE 4/>VWS: CPT | Mod: 26,,, | Performed by: RADIOLOGY

## 2021-10-07 PROCEDURE — 99999 PR PBB SHADOW E&M-EST. PATIENT-LVL III: CPT | Mod: PBBFAC,,, | Performed by: ANESTHESIOLOGY

## 2021-10-07 PROCEDURE — 3079F PR MOST RECENT DIASTOLIC BLOOD PRESSURE 80-89 MM HG: ICD-10-PCS | Mod: CPTII,S$GLB,, | Performed by: ANESTHESIOLOGY

## 2021-10-07 PROCEDURE — 3079F DIAST BP 80-89 MM HG: CPT | Mod: CPTII,S$GLB,, | Performed by: ANESTHESIOLOGY

## 2021-10-07 PROCEDURE — 3044F HG A1C LEVEL LT 7.0%: CPT | Mod: CPTII,S$GLB,, | Performed by: ANESTHESIOLOGY

## 2021-10-20 ENCOUNTER — OFFICE VISIT (OUTPATIENT)
Dept: GYNECOLOGIC ONCOLOGY | Facility: CLINIC | Age: 61
End: 2021-10-20
Payer: COMMERCIAL

## 2021-10-20 VITALS
WEIGHT: 163.38 LBS | DIASTOLIC BLOOD PRESSURE: 92 MMHG | HEIGHT: 65 IN | SYSTOLIC BLOOD PRESSURE: 139 MMHG | BODY MASS INDEX: 27.22 KG/M2

## 2021-10-20 DIAGNOSIS — N90.3 VULVAR DYSPLASIA: Primary | ICD-10-CM

## 2021-10-20 DIAGNOSIS — J44.9 CHRONIC OBSTRUCTIVE PULMONARY DISEASE, UNSPECIFIED COPD TYPE: ICD-10-CM

## 2021-10-20 DIAGNOSIS — E83.119 HEMOCHROMATOSIS, UNSPECIFIED HEMOCHROMATOSIS TYPE: ICD-10-CM

## 2021-10-20 DIAGNOSIS — D83.9 CVID (COMMON VARIABLE IMMUNODEFICIENCY): ICD-10-CM

## 2021-10-20 DIAGNOSIS — J45.40 MODERATE PERSISTENT ASTHMA WITHOUT COMPLICATION: ICD-10-CM

## 2021-10-20 PROCEDURE — 3075F PR MOST RECENT SYSTOLIC BLOOD PRESS GE 130-139MM HG: ICD-10-PCS | Mod: CPTII,S$GLB,, | Performed by: OBSTETRICS & GYNECOLOGY

## 2021-10-20 PROCEDURE — 1160F PR REVIEW ALL MEDS BY PRESCRIBER/CLIN PHARMACIST DOCUMENTED: ICD-10-PCS | Mod: CPTII,S$GLB,, | Performed by: OBSTETRICS & GYNECOLOGY

## 2021-10-20 PROCEDURE — 1159F PR MEDICATION LIST DOCUMENTED IN MEDICAL RECORD: ICD-10-PCS | Mod: CPTII,S$GLB,, | Performed by: OBSTETRICS & GYNECOLOGY

## 2021-10-20 PROCEDURE — 99999 PR PBB SHADOW E&M-EST. PATIENT-LVL III: CPT | Mod: PBBFAC,,, | Performed by: OBSTETRICS & GYNECOLOGY

## 2021-10-20 PROCEDURE — 3080F PR MOST RECENT DIASTOLIC BLOOD PRESSURE >= 90 MM HG: ICD-10-PCS | Mod: CPTII,S$GLB,, | Performed by: OBSTETRICS & GYNECOLOGY

## 2021-10-20 PROCEDURE — 3008F BODY MASS INDEX DOCD: CPT | Mod: CPTII,S$GLB,, | Performed by: OBSTETRICS & GYNECOLOGY

## 2021-10-20 PROCEDURE — 3080F DIAST BP >= 90 MM HG: CPT | Mod: CPTII,S$GLB,, | Performed by: OBSTETRICS & GYNECOLOGY

## 2021-10-20 PROCEDURE — 3008F PR BODY MASS INDEX (BMI) DOCUMENTED: ICD-10-PCS | Mod: CPTII,S$GLB,, | Performed by: OBSTETRICS & GYNECOLOGY

## 2021-10-20 PROCEDURE — 99999 PR PBB SHADOW E&M-EST. PATIENT-LVL III: ICD-10-PCS | Mod: PBBFAC,,, | Performed by: OBSTETRICS & GYNECOLOGY

## 2021-10-20 PROCEDURE — 3044F PR MOST RECENT HEMOGLOBIN A1C LEVEL <7.0%: ICD-10-PCS | Mod: CPTII,S$GLB,, | Performed by: OBSTETRICS & GYNECOLOGY

## 2021-10-20 PROCEDURE — 99214 PR OFFICE/OUTPT VISIT, EST, LEVL IV, 30-39 MIN: ICD-10-PCS | Mod: S$GLB,,, | Performed by: OBSTETRICS & GYNECOLOGY

## 2021-10-20 PROCEDURE — 99214 OFFICE O/P EST MOD 30 MIN: CPT | Mod: S$GLB,,, | Performed by: OBSTETRICS & GYNECOLOGY

## 2021-10-20 PROCEDURE — 3044F HG A1C LEVEL LT 7.0%: CPT | Mod: CPTII,S$GLB,, | Performed by: OBSTETRICS & GYNECOLOGY

## 2021-10-20 PROCEDURE — 1160F RVW MEDS BY RX/DR IN RCRD: CPT | Mod: CPTII,S$GLB,, | Performed by: OBSTETRICS & GYNECOLOGY

## 2021-10-20 PROCEDURE — 3075F SYST BP GE 130 - 139MM HG: CPT | Mod: CPTII,S$GLB,, | Performed by: OBSTETRICS & GYNECOLOGY

## 2021-10-20 PROCEDURE — 1159F MED LIST DOCD IN RCRD: CPT | Mod: CPTII,S$GLB,, | Performed by: OBSTETRICS & GYNECOLOGY

## 2021-10-28 ENCOUNTER — HOSPITAL ENCOUNTER (OUTPATIENT)
Facility: HOSPITAL | Age: 61
Discharge: HOME | End: 2021-10-28
Attending: ANESTHESIOLOGY | Admitting: ANESTHESIOLOGY
Payer: COMMERCIAL

## 2021-10-28 VITALS
WEIGHT: 163.38 LBS | DIASTOLIC BLOOD PRESSURE: 77 MMHG | RESPIRATION RATE: 18 BRPM | SYSTOLIC BLOOD PRESSURE: 127 MMHG | BODY MASS INDEX: 27.22 KG/M2 | TEMPERATURE: 98 F | HEIGHT: 65 IN | OXYGEN SATURATION: 95 % | HEART RATE: 65 BPM

## 2021-10-28 DIAGNOSIS — M47.816 LUMBAR FACET ARTHROPATHY: Primary | ICD-10-CM

## 2021-10-28 PROCEDURE — 64494 INJ PARAVERT F JNT L/S 2 LEV: CPT | Mod: RT,,, | Performed by: ANESTHESIOLOGY

## 2021-10-28 PROCEDURE — 64494 PR INJ DX/THER AGNT PARAVERT FACET JOINT,IMG GUIDE,LUMBAR/SAC, 2ND LEVEL: ICD-10-PCS | Mod: RT,,, | Performed by: ANESTHESIOLOGY

## 2021-10-28 PROCEDURE — 25000003 PHARM REV CODE 250: Performed by: ANESTHESIOLOGY

## 2021-10-28 PROCEDURE — 64494 INJ PARAVERT F JNT L/S 2 LEV: CPT | Performed by: ANESTHESIOLOGY

## 2021-10-28 PROCEDURE — 63600175 PHARM REV CODE 636 W HCPCS: Performed by: ANESTHESIOLOGY

## 2021-10-28 PROCEDURE — 64493 PR INJ DX/THER AGNT PARAVERT FACET JOINT,IMG GUIDE,LUMBAR/SAC,1ST LVL: ICD-10-PCS | Mod: RT,,, | Performed by: ANESTHESIOLOGY

## 2021-10-28 PROCEDURE — 64493 INJ PARAVERT F JNT L/S 1 LEV: CPT | Mod: RT,,, | Performed by: ANESTHESIOLOGY

## 2021-10-28 PROCEDURE — 64493 INJ PARAVERT F JNT L/S 1 LEV: CPT | Performed by: ANESTHESIOLOGY

## 2021-10-28 RX ORDER — INDOMETHACIN 25 MG/1
CAPSULE ORAL
Status: DISCONTINUED | OUTPATIENT
Start: 2021-10-28 | End: 2021-10-28 | Stop reason: HOSPADM

## 2021-10-28 RX ORDER — MIDAZOLAM HYDROCHLORIDE 1 MG/ML
INJECTION, SOLUTION INTRAMUSCULAR; INTRAVENOUS
Status: DISCONTINUED | OUTPATIENT
Start: 2021-10-28 | End: 2021-10-28 | Stop reason: HOSPADM

## 2021-10-28 RX ORDER — METHYLPREDNISOLONE ACETATE 40 MG/ML
INJECTION, SUSPENSION INTRA-ARTICULAR; INTRALESIONAL; INTRAMUSCULAR; SOFT TISSUE
Status: DISCONTINUED | OUTPATIENT
Start: 2021-10-28 | End: 2021-10-28 | Stop reason: HOSPADM

## 2021-10-28 RX ORDER — LIDOCAINE HYDROCHLORIDE 20 MG/ML
INJECTION, SOLUTION EPIDURAL; INFILTRATION; INTRACAUDAL; PERINEURAL
Status: DISCONTINUED | OUTPATIENT
Start: 2021-10-28 | End: 2021-10-28 | Stop reason: HOSPADM

## 2021-10-28 RX ORDER — FENTANYL CITRATE 50 UG/ML
INJECTION, SOLUTION INTRAMUSCULAR; INTRAVENOUS
Status: DISCONTINUED | OUTPATIENT
Start: 2021-10-28 | End: 2021-10-28 | Stop reason: HOSPADM

## 2021-11-03 ENCOUNTER — OFFICE VISIT (OUTPATIENT)
Dept: ALLERGY | Facility: CLINIC | Age: 61
End: 2021-11-03
Payer: COMMERCIAL

## 2021-11-03 VITALS
HEIGHT: 65 IN | WEIGHT: 162.94 LBS | HEART RATE: 72 BPM | BODY MASS INDEX: 27.15 KG/M2 | DIASTOLIC BLOOD PRESSURE: 85 MMHG | TEMPERATURE: 98 F | SYSTOLIC BLOOD PRESSURE: 152 MMHG

## 2021-11-03 DIAGNOSIS — D83.9 CVID (COMMON VARIABLE IMMUNODEFICIENCY): Primary | ICD-10-CM

## 2021-11-03 DIAGNOSIS — J44.89 ASTHMA-COPD OVERLAP SYNDROME: ICD-10-CM

## 2021-11-03 DIAGNOSIS — J32.9 RECURRENT SINUSITIS: ICD-10-CM

## 2021-11-03 DIAGNOSIS — J40 BRONCHITIS: ICD-10-CM

## 2021-11-03 DIAGNOSIS — D83.9 COMMON VARIABLE IMMUNODEFICIENCY: ICD-10-CM

## 2021-11-03 DIAGNOSIS — J45.40 MODERATE PERSISTENT ASTHMA WITHOUT COMPLICATION: ICD-10-CM

## 2021-11-03 DIAGNOSIS — E83.119 HEMOCHROMATOSIS, UNSPECIFIED HEMOCHROMATOSIS TYPE: ICD-10-CM

## 2021-11-03 DIAGNOSIS — J44.9 CHRONIC OBSTRUCTIVE PULMONARY DISEASE, UNSPECIFIED COPD TYPE: ICD-10-CM

## 2021-11-03 PROCEDURE — 99999 PR PBB SHADOW E&M-EST. PATIENT-LVL III: ICD-10-PCS | Mod: PBBFAC,,, | Performed by: SPECIALIST

## 2021-11-03 PROCEDURE — 1160F PR REVIEW ALL MEDS BY PRESCRIBER/CLIN PHARMACIST DOCUMENTED: ICD-10-PCS | Mod: CPTII,S$GLB,, | Performed by: SPECIALIST

## 2021-11-03 PROCEDURE — 1160F RVW MEDS BY RX/DR IN RCRD: CPT | Mod: CPTII,S$GLB,, | Performed by: SPECIALIST

## 2021-11-03 PROCEDURE — 3008F PR BODY MASS INDEX (BMI) DOCUMENTED: ICD-10-PCS | Mod: CPTII,S$GLB,, | Performed by: SPECIALIST

## 2021-11-03 PROCEDURE — 99999 PR PBB SHADOW E&M-EST. PATIENT-LVL III: CPT | Mod: PBBFAC,,, | Performed by: SPECIALIST

## 2021-11-03 PROCEDURE — 1159F PR MEDICATION LIST DOCUMENTED IN MEDICAL RECORD: ICD-10-PCS | Mod: CPTII,S$GLB,, | Performed by: SPECIALIST

## 2021-11-03 PROCEDURE — 3079F DIAST BP 80-89 MM HG: CPT | Mod: CPTII,S$GLB,, | Performed by: SPECIALIST

## 2021-11-03 PROCEDURE — 3077F SYST BP >= 140 MM HG: CPT | Mod: CPTII,S$GLB,, | Performed by: SPECIALIST

## 2021-11-03 PROCEDURE — 3077F PR MOST RECENT SYSTOLIC BLOOD PRESSURE >= 140 MM HG: ICD-10-PCS | Mod: CPTII,S$GLB,, | Performed by: SPECIALIST

## 2021-11-03 PROCEDURE — 3008F BODY MASS INDEX DOCD: CPT | Mod: CPTII,S$GLB,, | Performed by: SPECIALIST

## 2021-11-03 PROCEDURE — 3044F HG A1C LEVEL LT 7.0%: CPT | Mod: CPTII,S$GLB,, | Performed by: SPECIALIST

## 2021-11-03 PROCEDURE — 99214 OFFICE O/P EST MOD 30 MIN: CPT | Mod: S$GLB,,, | Performed by: SPECIALIST

## 2021-11-03 PROCEDURE — 99214 PR OFFICE/OUTPT VISIT, EST, LEVL IV, 30-39 MIN: ICD-10-PCS | Mod: S$GLB,,, | Performed by: SPECIALIST

## 2021-11-03 PROCEDURE — 1159F MED LIST DOCD IN RCRD: CPT | Mod: CPTII,S$GLB,, | Performed by: SPECIALIST

## 2021-11-03 PROCEDURE — 3079F PR MOST RECENT DIASTOLIC BLOOD PRESSURE 80-89 MM HG: ICD-10-PCS | Mod: CPTII,S$GLB,, | Performed by: SPECIALIST

## 2021-11-03 PROCEDURE — 3044F PR MOST RECENT HEMOGLOBIN A1C LEVEL <7.0%: ICD-10-PCS | Mod: CPTII,S$GLB,, | Performed by: SPECIALIST

## 2021-11-11 ENCOUNTER — TELEPHONE (OUTPATIENT)
Dept: ALLERGY | Facility: CLINIC | Age: 61
End: 2021-11-11
Payer: COMMERCIAL

## 2021-11-22 ENCOUNTER — TELEPHONE (OUTPATIENT)
Dept: PAIN MEDICINE | Facility: CLINIC | Age: 61
End: 2021-11-22
Payer: COMMERCIAL

## 2021-11-23 ENCOUNTER — OFFICE VISIT (OUTPATIENT)
Dept: PAIN MEDICINE | Facility: CLINIC | Age: 61
End: 2021-11-23
Payer: COMMERCIAL

## 2021-11-23 VITALS
DIASTOLIC BLOOD PRESSURE: 95 MMHG | HEART RATE: 67 BPM | BODY MASS INDEX: 27.51 KG/M2 | HEIGHT: 65 IN | WEIGHT: 165.13 LBS | SYSTOLIC BLOOD PRESSURE: 151 MMHG

## 2021-11-23 DIAGNOSIS — M47.816 LUMBAR FACET ARTHROPATHY: Primary | ICD-10-CM

## 2021-11-23 DIAGNOSIS — M47.816 LUMBAR SPONDYLOSIS: ICD-10-CM

## 2021-11-23 PROCEDURE — 99214 OFFICE O/P EST MOD 30 MIN: CPT | Mod: S$GLB,,, | Performed by: ANESTHESIOLOGY

## 2021-11-23 PROCEDURE — 99214 PR OFFICE/OUTPT VISIT, EST, LEVL IV, 30-39 MIN: ICD-10-PCS | Mod: S$GLB,,, | Performed by: ANESTHESIOLOGY

## 2021-11-23 PROCEDURE — 99999 PR PBB SHADOW E&M-EST. PATIENT-LVL III: CPT | Mod: PBBFAC,,, | Performed by: ANESTHESIOLOGY

## 2021-11-23 PROCEDURE — 99999 PR PBB SHADOW E&M-EST. PATIENT-LVL III: ICD-10-PCS | Mod: PBBFAC,,, | Performed by: ANESTHESIOLOGY

## 2021-12-02 DIAGNOSIS — M47.816 LUMBAR FACET ARTHROPATHY: Primary | ICD-10-CM

## 2021-12-02 DIAGNOSIS — M47.816 LUMBAR SPONDYLOSIS: ICD-10-CM

## 2021-12-06 ENCOUNTER — TELEPHONE (OUTPATIENT)
Dept: PAIN MEDICINE | Facility: CLINIC | Age: 61
End: 2021-12-06
Payer: COMMERCIAL

## 2021-12-08 ENCOUNTER — TELEPHONE (OUTPATIENT)
Dept: ALLERGY | Facility: CLINIC | Age: 61
End: 2021-12-08
Payer: COMMERCIAL

## 2021-12-14 ENCOUNTER — TELEPHONE (OUTPATIENT)
Dept: PAIN MEDICINE | Facility: CLINIC | Age: 61
End: 2021-12-14
Payer: COMMERCIAL

## 2021-12-14 ENCOUNTER — PATIENT MESSAGE (OUTPATIENT)
Dept: PAIN MEDICINE | Facility: CLINIC | Age: 61
End: 2021-12-14
Payer: COMMERCIAL

## 2021-12-30 ENCOUNTER — HOSPITAL ENCOUNTER (OUTPATIENT)
Facility: HOSPITAL | Age: 61
Discharge: HOME | End: 2021-12-30
Attending: ANESTHESIOLOGY | Admitting: ANESTHESIOLOGY
Payer: COMMERCIAL

## 2021-12-30 VITALS
BODY MASS INDEX: 27.03 KG/M2 | RESPIRATION RATE: 16 BRPM | SYSTOLIC BLOOD PRESSURE: 131 MMHG | HEART RATE: 61 BPM | WEIGHT: 162.25 LBS | HEIGHT: 65 IN | TEMPERATURE: 98 F | DIASTOLIC BLOOD PRESSURE: 87 MMHG | OXYGEN SATURATION: 95 %

## 2021-12-30 DIAGNOSIS — M47.816 LUMBAR FACET ARTHROPATHY: Primary | ICD-10-CM

## 2021-12-30 PROCEDURE — 25000003 PHARM REV CODE 250: Performed by: ANESTHESIOLOGY

## 2021-12-30 PROCEDURE — 64494 INJ PARAVERT F JNT L/S 2 LEV: CPT | Mod: 50 | Performed by: ANESTHESIOLOGY

## 2021-12-30 PROCEDURE — 63600175 PHARM REV CODE 636 W HCPCS: Performed by: ANESTHESIOLOGY

## 2021-12-30 PROCEDURE — 64494 INJ PARAVERT F JNT L/S 2 LEV: CPT | Mod: 50,,, | Performed by: ANESTHESIOLOGY

## 2021-12-30 PROCEDURE — 64495 INJ PARAVERT F JNT L/S 3 LEV: CPT | Mod: 50

## 2021-12-30 PROCEDURE — 64493 INJ PARAVERT F JNT L/S 1 LEV: CPT | Mod: 50 | Performed by: ANESTHESIOLOGY

## 2021-12-30 PROCEDURE — 64494 PR INJ DX/THER AGNT PARAVERT FACET JOINT,IMG GUIDE,LUMBAR/SAC, 2ND LEVEL: ICD-10-PCS | Mod: 50,,, | Performed by: ANESTHESIOLOGY

## 2021-12-30 PROCEDURE — 64493 PR INJ DX/THER AGNT PARAVERT FACET JOINT,IMG GUIDE,LUMBAR/SAC,1ST LVL: ICD-10-PCS | Mod: 50,,, | Performed by: ANESTHESIOLOGY

## 2021-12-30 PROCEDURE — 64493 INJ PARAVERT F JNT L/S 1 LEV: CPT | Mod: 50,,, | Performed by: ANESTHESIOLOGY

## 2021-12-30 RX ORDER — METHYLPREDNISOLONE ACETATE 40 MG/ML
INJECTION, SUSPENSION INTRA-ARTICULAR; INTRALESIONAL; INTRAMUSCULAR; SOFT TISSUE
Status: DISCONTINUED | OUTPATIENT
Start: 2021-12-30 | End: 2021-12-30 | Stop reason: HOSPADM

## 2021-12-30 RX ORDER — FENTANYL CITRATE 50 UG/ML
INJECTION, SOLUTION INTRAMUSCULAR; INTRAVENOUS
Status: DISCONTINUED | OUTPATIENT
Start: 2021-12-30 | End: 2021-12-30 | Stop reason: HOSPADM

## 2021-12-30 RX ORDER — LIDOCAINE HYDROCHLORIDE 20 MG/ML
INJECTION, SOLUTION EPIDURAL; INFILTRATION; INTRACAUDAL; PERINEURAL
Status: DISCONTINUED | OUTPATIENT
Start: 2021-12-30 | End: 2021-12-30 | Stop reason: HOSPADM

## 2021-12-30 RX ORDER — INDOMETHACIN 25 MG/1
CAPSULE ORAL
Status: DISCONTINUED | OUTPATIENT
Start: 2021-12-30 | End: 2021-12-30 | Stop reason: HOSPADM

## 2021-12-30 RX ORDER — MIDAZOLAM HYDROCHLORIDE 1 MG/ML
INJECTION, SOLUTION INTRAMUSCULAR; INTRAVENOUS
Status: DISCONTINUED | OUTPATIENT
Start: 2021-12-30 | End: 2021-12-30 | Stop reason: HOSPADM

## 2022-01-08 ENCOUNTER — PATIENT MESSAGE (OUTPATIENT)
Dept: PAIN MEDICINE | Facility: CLINIC | Age: 62
End: 2022-01-08
Payer: COMMERCIAL

## 2022-01-11 ENCOUNTER — PATIENT MESSAGE (OUTPATIENT)
Dept: ALLERGY | Facility: CLINIC | Age: 62
End: 2022-01-11
Payer: COMMERCIAL

## 2022-01-12 ENCOUNTER — TELEPHONE (OUTPATIENT)
Dept: ALLERGY | Facility: CLINIC | Age: 62
End: 2022-01-12
Payer: COMMERCIAL

## 2022-01-12 NOTE — TELEPHONE ENCOUNTER
Spoke with Mila at [facility] and was told pt is good to go, nothing needed on our end. Pt notified.

## 2022-01-12 NOTE — TELEPHONE ENCOUNTER
Spoke with pt, just got off phone with Mila at [facility] and was told she is good to go. We do not need to do anything for pt.

## 2022-01-12 NOTE — TELEPHONE ENCOUNTER
----- Message from Michelle Martinez sent at 1/12/2022 10:41 AM CST -----  .Type:  Needs Medical Advice    Who Called: PAUL GARCIA [[id number]]  Symptoms (please be specific):   How long has patient had these symptoms:    Pharmacy name and phone #:    Would the patient rather a call back or a response via [facility]?   Best Call Back Number:  [phone]  Additional Information:  Pt is requesting a call from office regarding changing her pharmacy for her medication.Please call

## 2022-01-19 ENCOUNTER — TELEPHONE (OUTPATIENT)
Dept: ALLERGY | Facility: CLINIC | Age: 62
End: 2022-01-19
Payer: COMMERCIAL

## 2022-01-19 NOTE — TELEPHONE ENCOUNTER
----- Message from Michelle Martinez sent at 1/19/2022 12:46 PM CST -----  .Type:  RX Refill Request       Refill or New Rx: Immune Globulin G, IGG,-PRO-IGA 10 % injection, Privigen, (PRIVIGEN) 10 % Soln  RX Name and Strength:  How is the patient currently taking it? (ex. 1XDay):Inject 300 mLs (30 g total) into the vein every 28 days  Is this a 30 day or 90 day RX:     Preferred Pharmacy with phone number:   [facility]   Phone: [phone] Option 2

## 2022-01-19 NOTE — TELEPHONE ENCOUNTER
Spoke with Nitin SLATER pharmacist with [facility].  Order given for Gamunex 30 grams with 11 refills for pts IVIG therapy. They will contact [facility] for drop ship instructions.

## 2022-01-25 ENCOUNTER — OFFICE VISIT (OUTPATIENT)
Dept: PAIN MEDICINE | Facility: CLINIC | Age: 62
End: 2022-01-25
Payer: COMMERCIAL

## 2022-01-25 DIAGNOSIS — M54.16 LUMBAR RADICULOPATHY: ICD-10-CM

## 2022-01-25 DIAGNOSIS — M47.816 LUMBAR FACET ARTHROPATHY: Primary | ICD-10-CM

## 2022-01-25 DIAGNOSIS — M47.816 LUMBAR SPONDYLOSIS: ICD-10-CM

## 2022-01-25 PROCEDURE — 1160F PR REVIEW ALL MEDS BY PRESCRIBER/CLIN PHARMACIST DOCUMENTED: ICD-10-PCS | Mod: CPTII,95,, | Performed by: ANESTHESIOLOGY

## 2022-01-25 PROCEDURE — 1159F MED LIST DOCD IN RCRD: CPT | Mod: CPTII,95,, | Performed by: ANESTHESIOLOGY

## 2022-01-25 PROCEDURE — 1159F PR MEDICATION LIST DOCUMENTED IN MEDICAL RECORD: ICD-10-PCS | Mod: CPTII,95,, | Performed by: ANESTHESIOLOGY

## 2022-01-25 PROCEDURE — 1160F RVW MEDS BY RX/DR IN RCRD: CPT | Mod: CPTII,95,, | Performed by: ANESTHESIOLOGY

## 2022-01-25 PROCEDURE — 99214 PR OFFICE/OUTPT VISIT, EST, LEVL IV, 30-39 MIN: ICD-10-PCS | Mod: 95,,, | Performed by: ANESTHESIOLOGY

## 2022-01-25 PROCEDURE — 99214 OFFICE O/P EST MOD 30 MIN: CPT | Mod: 95,,, | Performed by: ANESTHESIOLOGY

## 2022-01-25 NOTE — PROGRESS NOTES
The patient location is: Home   The chief complaint leading to consultation is: Lumbar Back Pain     Visit type: audiovisual    Face to Face time with patient: 15  20 minutes of total time spent on the encounter, which includes face to face time and non-face to face time preparing to see the patient (eg, review of tests), Obtaining and/or reviewing separately obtained history, Documenting clinical information in the electronic or other health record, Independently interpreting results (not separately reported) and communicating results to the patient/family/caregiver, or Care coordination (not separately reported).         Each patient to whom he or she provides medical services by telemedicine is:  (1) informed of the relationship between the physician and patient and the respective role of any other health care provider with respect to management of the patient; and (2) notified that he or she may decline to receive medical services by telemedicine and may withdraw from such care at any time.    Notes:     Chief Pain Complaint:  Lumbar Back Pain       History of Present Illness:   María Julian is a 61 y.o. female  who is presenting with a chief complaint of Lumbar Back Pain. The patient began experiencing this problem insidiously, and the pain has been gradually worsening over the past 5 year(s). The pain is described as throbbing, cramping, aching and heavy and is located in the lumbar back pain. Pain is intermittent and lasts hours. The pain radiates to right leg. The patient rates her pain a 7 out of ten and interferes with activities of daily living a 7 out of ten. Pain is exacerbated by flexion/ extension of the lumbar spine, and is improved by rest. Patient reports no prior trauma, no prior spinal surgery microdiscectomy with Dr DAVALOS in [place] >5 years ago.    - pertinent negatives: No fever, No chills, No weight loss, No bladder dysfunction, No bowel dysfunction, No saddle anesthesia  - pertinent  positives: none    - medications, other therapies tried (physical therapy, injections):     >> NSAIDs, Tylenol, gabapentin and flexeril    >> Has previously undergone Physical Therapy    >> Has previously undergone spinal injection/s         Lumbar MBB and RFA with good relief for 3 years         Lumbar LAURI          Right L3, 4,5 MBB on 10/28/2021 with 80% pain relief         Bilateral L3, 4,5, MBB on 12/30/2021 with 80% relief     Imaging / Labs / Studies (reviewed on 1/25/2022):      Review of Systems:  CONSTITUTIONAL: patient denies any fever, chills, or weight loss  SKIN: patient denies any rash or itching  RESPIRATORY: patient denies having any shortness of breath  GASTROINTESTINAL: patient denies having any diarrhea, constipation, or bowel incontinence  GENITOURINARY: patient denies having any abnormal bladder function    MUSCULOSKELETAL:  - patient complains of the above noted pain/s (see chief pain complaint)    NEUROLOGICAL:   - pain as above  - strength in Lower extremities is intact, BILATERALLY  - sensation in Lower extremities is intact, BILATERALLY  - patient denies any loss of bowel or bladder control      PSYCHIATRIC: patient denies any change in mood     Other:  All other systems reviewed and are negative      Physical Exam:  There were no vitals taken for this visit. (reviewed on 1/25/2022)  General: Alert and oriented, in no apparent distress.  Gait: normal gait.  Skin: No rashes, No discoloration, No obvious lesions  HEENT: Normocephalic, atraumatic. Pupils equal and round.  Cardiovascular: Regular rate and rhythm , no significant peripheral edema present  Respiratory: Without audible wheezing, without use of accessory muscles of respiration.    Musculoskeletal:    Cervical Spine    - Pain on flexion of cervical spine Absent  - Spurling's Test:  Absent    - Pain on extension of cervical spine Absent  - TTP over the cervical facet joints Absent  - Cervical facet loading Absent      Lumbar  Spine    - Pain on flexion of lumbar spine Absent  - Straight Leg Raise:  Absent    - Pain on extension of lumbar spine Present  - TTP over the lumbar facet joints Present bilateral L5-S1   - Lumbar facet loading Present    -Pain on palpation over the SI joint  Absent  - SWATHI: Absent      Neuro:    Strength:  UE R/L: D: 5/5; B: 5/5; T: 5/5; WF: 5/5; WE: 5/5; IO: 5/5;  LE R/L: HF: 5/5, HE: 5/5, KF: 5/5; KE: 5/5; FE: 5/5; FF: 5/5    Extremity Reflexes: Brisk and symmetric throughout.      Extremity Sensory: Sensation to pinprick and temperature symmetric. Proprioception intact.      Psych:  Mood and affect is appropriate      Assessment:    María Julian is a 61 y.o. year old female who is presenting with     Encounter Diagnoses   Name Primary?    Lumbar facet arthropathy Yes    Lumbar spondylosis     Lumbar radiculopathy        Plan:     1. Interventional: Schedule patient for Right L3, 4,5 MBB RFA with RN IV sedation.       S/p Bilateral L3, 4,5, MBB on 12/30/2021 with 80% relief.       S/p Right L3, 4,5 MBB on 10/28/2021 with 80% pain relief.    2. Pharmacologic: Continue Gabapentin 300 mg Po QHs.     3. Rehabilitative: Encouraged PT.     4. Diagnostic: Lumbar xray reviewed.      5. Follow up: Post Injection.     20 minutes were spent in this encounter with more than 50% of the time used for counseling and review of the plan.  Imaging / studies reviewed, detailed above.  I discussed in detail the risks, benefits, and alternatives to any and all potential treatment options.  All questions and concerns were fully addressed today in clinic. Medical decision making moderate.    Thank you for the opportunity to assist in the care of this patient.    Best wishes,    Signed:    Lei Copeland MD          Disclaimer:  This note may have been prepared using voice recognition software, it may have not been extensively proofed, as such there could be errors within the text such as sound alike errors.

## 2022-01-26 ENCOUNTER — PATIENT MESSAGE (OUTPATIENT)
Dept: PAIN MEDICINE | Facility: CLINIC | Age: 62
End: 2022-01-26
Payer: COMMERCIAL

## 2022-02-03 ENCOUNTER — OFFICE VISIT (OUTPATIENT)
Dept: ALLERGY | Facility: CLINIC | Age: 62
End: 2022-02-03
Payer: COMMERCIAL

## 2022-02-03 ENCOUNTER — TELEPHONE (OUTPATIENT)
Dept: ALLERGY | Facility: CLINIC | Age: 62
End: 2022-02-03
Payer: COMMERCIAL

## 2022-02-03 VITALS
HEART RATE: 71 BPM | HEIGHT: 65 IN | TEMPERATURE: 98 F | WEIGHT: 166.88 LBS | DIASTOLIC BLOOD PRESSURE: 88 MMHG | BODY MASS INDEX: 27.81 KG/M2 | SYSTOLIC BLOOD PRESSURE: 151 MMHG

## 2022-02-03 DIAGNOSIS — J32.9 RECURRENT SINUSITIS: ICD-10-CM

## 2022-02-03 DIAGNOSIS — R05.9 COUGH IN ADULT PATIENT: ICD-10-CM

## 2022-02-03 DIAGNOSIS — J44.89 ASTHMA-COPD OVERLAP SYNDROME: ICD-10-CM

## 2022-02-03 DIAGNOSIS — Z87.891 EX-CIGARETTE SMOKER: ICD-10-CM

## 2022-02-03 DIAGNOSIS — D83.9 CVID (COMMON VARIABLE IMMUNODEFICIENCY): Primary | ICD-10-CM

## 2022-02-03 DIAGNOSIS — E83.119 HEMOCHROMATOSIS, UNSPECIFIED HEMOCHROMATOSIS TYPE: ICD-10-CM

## 2022-02-03 DIAGNOSIS — J45.40 MODERATE PERSISTENT ASTHMA WITHOUT COMPLICATION: ICD-10-CM

## 2022-02-03 DIAGNOSIS — J40 BRONCHITIS: ICD-10-CM

## 2022-02-03 PROCEDURE — 99999 PR PBB SHADOW E&M-EST. PATIENT-LVL III: CPT | Mod: PBBFAC,,, | Performed by: SPECIALIST

## 2022-02-03 PROCEDURE — 1160F PR REVIEW ALL MEDS BY PRESCRIBER/CLIN PHARMACIST DOCUMENTED: ICD-10-PCS | Mod: CPTII,S$GLB,, | Performed by: SPECIALIST

## 2022-02-03 PROCEDURE — 1159F MED LIST DOCD IN RCRD: CPT | Mod: CPTII,S$GLB,, | Performed by: SPECIALIST

## 2022-02-03 PROCEDURE — 3077F PR MOST RECENT SYSTOLIC BLOOD PRESSURE >= 140 MM HG: ICD-10-PCS | Mod: CPTII,S$GLB,, | Performed by: SPECIALIST

## 2022-02-03 PROCEDURE — 99214 PR OFFICE/OUTPT VISIT, EST, LEVL IV, 30-39 MIN: ICD-10-PCS | Mod: S$GLB,,, | Performed by: SPECIALIST

## 2022-02-03 PROCEDURE — 1159F PR MEDICATION LIST DOCUMENTED IN MEDICAL RECORD: ICD-10-PCS | Mod: CPTII,S$GLB,, | Performed by: SPECIALIST

## 2022-02-03 PROCEDURE — 99214 OFFICE O/P EST MOD 30 MIN: CPT | Mod: S$GLB,,, | Performed by: SPECIALIST

## 2022-02-03 PROCEDURE — 3079F PR MOST RECENT DIASTOLIC BLOOD PRESSURE 80-89 MM HG: ICD-10-PCS | Mod: CPTII,S$GLB,, | Performed by: SPECIALIST

## 2022-02-03 PROCEDURE — 3008F PR BODY MASS INDEX (BMI) DOCUMENTED: ICD-10-PCS | Mod: CPTII,S$GLB,, | Performed by: SPECIALIST

## 2022-02-03 PROCEDURE — 3077F SYST BP >= 140 MM HG: CPT | Mod: CPTII,S$GLB,, | Performed by: SPECIALIST

## 2022-02-03 PROCEDURE — 3008F BODY MASS INDEX DOCD: CPT | Mod: CPTII,S$GLB,, | Performed by: SPECIALIST

## 2022-02-03 PROCEDURE — 3079F DIAST BP 80-89 MM HG: CPT | Mod: CPTII,S$GLB,, | Performed by: SPECIALIST

## 2022-02-03 PROCEDURE — 99999 PR PBB SHADOW E&M-EST. PATIENT-LVL III: ICD-10-PCS | Mod: PBBFAC,,, | Performed by: SPECIALIST

## 2022-02-03 PROCEDURE — 1160F RVW MEDS BY RX/DR IN RCRD: CPT | Mod: CPTII,S$GLB,, | Performed by: SPECIALIST

## 2022-02-03 RX ORDER — AZITHROMYCIN 250 MG/1
TABLET, FILM COATED ORAL
Qty: 11 TABLET | Refills: 3 | Status: SHIPPED | OUTPATIENT
Start: 2022-02-03 | End: 2022-02-13

## 2022-02-03 NOTE — PROGRESS NOTES
Subjective:       Patient ID: María Julian is a 61 y.o. female.    Chief Complaint:  Immunodeficiency (On weekly infusions through [facility]. 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 As a consequence, has been getting recurrent sino pulmonary infections. Starting her on monthly IVIG about 15 years ago has ut down the number of infections and improved the quality of life.  Breakthrough infections were treated with Augmentin and prn Prednisone-- caused bd vaginal yeast. In the past Levaquin caused tendonitis and has Macrodantin allergy and RCM allergy.    Has year round nasal and eye allergies- treated symptomatically.    Trough IgG levels have been over 1000 mg/ dl    Being monitored for sequelae of CVID.    Been a long time cigarette smoker. COPD- ASTHMA OVERLAP is treated with an ICS- LABA- LAMA inhaler.    Has hemachromatoss- Follows up q 6 months with blood tests by Dr Garza.    Ex cigarette smoker    Outpatient Medications Marked as Taking for the 2/3/22 encounter (Office Visit) with Dez Galindo MD   Medication Sig Dispense Refill    diphenhydrAMINE (BENADRYL) 25 mg capsule Take 25 mg by mouth. Before infusions      gabapentin (NEURONTIN) 300 MG capsule Take 300 mg by mouth every evening.       hydroCHLOROthiazide (HYDRODIURIL) 12.5 MG Tab Take 1 tablet (12.5 mg total) by mouth once daily. 30 tablet 11    Immune Globulin G, IGG,-PRO-IGA 10 % injection, Privigen, (PRIVIGEN) 10 % Soln Inject 300 mLs (30 g total) into the vein every 28 days. 300 mL 11    predniSONE (DELTASONE) 10 MG tablet TAKE 1 TABLET BY MOUTH EVERY DAY AS NEEDED 30 tablet 0    TRELEGY ELLIPTA 200-62.5-25 mcg inhaler INHALE 1 PUFF INTO THE LUNGS ONCE DAILY. 60 each 5        Erythromycin, Macrolide antibiotics, Iodine and iodide containing products, and Levofloxacin     Past Medical History:   Diagnosis Date     "Abnormal Pap smear of cervix     Allergy     Asthma     COPD (chronic obstructive pulmonary disease)     Malignant hyperthermia     pt's mother has hx of MH.  Pt denies any complications with general anesthesia    Recurrent upper respiratory infection (URI)        Family History   Problem Relation Age of Onset    Malignant hypertension Mother        Environmental History: Dust Mite Controls: Dust mite controls are already in place.     Review of Systems   Constitutional: Positive for fatigue. Negative for fever.   HENT: Positive for congestion, postnasal drip and rhinorrhea. Negative for ear pain, sinus pressure, sinus pain, sneezing and sore throat.    Eyes: Positive for itching. Negative for redness.   Respiratory: Positive for cough. Negative for chest tightness, shortness of breath and wheezing.    Cardiovascular: Negative.  Negative for chest pain.   Gastrointestinal: Negative.  Negative for nausea.   Endocrine: Negative.  Negative for cold intolerance.   Genitourinary: Negative.  Negative for frequency.   Musculoskeletal: Negative.  Negative for myalgias.   Skin: Negative.  Negative for rash.   Allergic/Immunologic: Positive for environmental allergies. Negative for food allergies and immunocompromised state.   Neurological: Negative.  Negative for dizziness and headaches.   Hematological: Negative.  Negative for adenopathy.   Psychiatric/Behavioral: Negative.  Negative for sleep disturbance.       Objective:     Visit Vitals  BP (!) 151/88 (BP Location: Left arm, Patient Position: Sitting)   Pulse 71   Temp 97.7 °F (36.5 °C)   Ht 5' 5" (1.651 m)   Wt 75.7 kg (166 lb 14.2 oz)   BMI 27.77 kg/m²       Physical Exam  Vitals and nursing note reviewed.   Constitutional:       Appearance: Normal appearance. She is normal weight.   HENT:      Head: Normocephalic and atraumatic.      Right Ear: Tympanic membrane, ear canal and external ear normal.      Left Ear: Tympanic membrane, ear canal and external ear " normal.      Nose: Congestion and rhinorrhea present.      Mouth/Throat:      Mouth: Mucous membranes are moist.   Eyes:      Extraocular Movements: Extraocular movements intact.      Conjunctiva/sclera: Conjunctivae normal.      Pupils: Pupils are equal, round, and reactive to light.   Cardiovascular:      Rate and Rhythm: Normal rate and regular rhythm.      Pulses: Normal pulses.      Heart sounds: Normal heart sounds.   Pulmonary:      Effort: Pulmonary effort is normal.      Breath sounds: Normal breath sounds.   Abdominal:      General: Abdomen is flat. Bowel sounds are normal.      Palpations: Abdomen is soft.   Musculoskeletal:         General: Normal range of motion.      Cervical back: Normal range of motion and neck supple.   Skin:     General: Skin is warm and dry.      Capillary Refill: Capillary refill takes less than 2 seconds.   Neurological:      General: No focal deficit present.      Mental Status: She is alert and oriented to person, place, and time. Mental status is at baseline.   Psychiatric:         Mood and Affect: Mood normal.         Behavior: Behavior normal.         Thought Content: Thought content normal.         Judgment: Judgment normal.           Assessment:      1. CVID (common variable immunodeficiency)    2. Recurrent sinusitis    3. Bronchitis    4. Cough in adult patient    5. Ex-cigarette smoker    6. Moderate persistent asthma without complication    7. Hemochromatosis, unspecified hemochromatosis type    8. Asthma-COPD overlap syndrome    9       LEVAQUIN CAUSED TENDONITIS    Plan:     Monthly IVIG Privigen 10 % 30 grams ( 300 ml ) to be infused over 4 hoursq 4 weeks   Trough IgG -- 11- 12- 21= 1088 mg/dl and 2- 26- 21= 1011 mg/ dl  Monitor for the short and long term sequelae of CVID  Treat all infections-- Azithromycin 250 mg-- 10 days course and Prednisone 20 mg qd 2- 5 days  Trelegy ellipta 200/ 62.5 / 25= one puff daily  Reviewed and discussed 6- 15- 21-- BMP and CBC and  3- 12- 21-- Hep C, TSH, Hb A1 C, CMP and Lipid panel results-- all normal    Proair HFA 90 mcg 2 puffs tid prn  Augmentin cused severe vaginal yeast  Had COVID vaccine-- 3 doses  Annual influenza vaccinations  FOLLOW UP IN 3 MONTHS                    Problems Address                                                 Amount and/or Complexity                                                                      Risk       3           [] 2 or more self-limited or minor problems                      [] Limited                                                                        [] Low                  [] 1 stable chronic illness                                                  Any combination of the two                                               OTC drugs                  []Acute, uncomplicated illness or injury                            Review of prior external notes from unique source           Minor surgery with no risk factors                                                                                                               [] 1 []2  []3+                                                                                                              Review of results from each unique test                                                                                                               [] 1 []2  [] 3+                                                                                                              Order of each unique test                                                                                                               [] 1 []2  [] 3+                                                                                                              Or                                                                                                             [] Assessment requiring an independent historian      4            [x] One or more chronic illness with exacerbation,               [x] Moderate                                                                      [x] Moderate                 Progression, or side effects of treatment                            -test documents or independent historians                        Prescription drug management                [x]  2 or more sable chronic illnesses                                    [] Independent interpretation of tests                              Minor surgery with identifiable risk                [] 1 undiagnosed new problem with uncertain prognosis    [] Discussion or management of test results                    elective major surgery                [] 1 acute illness with                systemic symptoms                                                                                                                                                              [] 1 acute complicated injury                                                                                                                                          Elective major surgery                                                                                                                                                                                                                                                                                                                                                                                                  5            [] 1 or more chronic illnesses with severe exacerbation,     [] Extensive(two from below)                                         [] High                                                                                                               [] Independent interpretation of results                         Drug therapy requiring intensive                                                                                                               []Discussion of management or test  interpretation           monitoring                                                                                                                                                                                                       Decision to de-escalate care                 [] 1 acute or chronic illness or injury that poses a threat                                                                                               Decision regarding hospitalization

## 2022-02-03 NOTE — TELEPHONE ENCOUNTER
Spoke with momo GamaD, at [facility]. Verbal order given for Privigen 10% 30 grams every 4 weeks with 11 additional refills. Auth # [id number] good from 12-8-21 thru 12-.

## 2022-02-08 ENCOUNTER — TELEPHONE (OUTPATIENT)
Dept: PAIN MEDICINE | Facility: CLINIC | Age: 62
End: 2022-02-08
Payer: COMMERCIAL

## 2022-02-08 NOTE — TELEPHONE ENCOUNTER
"Contacted Pt and notified per Brody JUAN Pt appears to be on antibiotic for recurrent sinuitis, Pt states the are prophylactic and takes as needed for auto immune disease. Also notified we have not received authorization from her insurance, Pt became upset stating Dr. Copeland needed to call the insurance company, told her Dr. Copeland is not in clinic until tomorrow and we do not have a reference number to do a peer to peer and our pre-service department handles this. Pt became verbally aggressive stating "I don't understand why you cant just call the insurance company and figure this out I should not have to call them" notified Pt all the information was submitted and it was under medical director review with her insurance. Pt hung up the phone.    "

## 2022-02-10 ENCOUNTER — PATIENT MESSAGE (OUTPATIENT)
Dept: PAIN MEDICINE | Facility: CLINIC | Age: 62
End: 2022-02-10
Payer: COMMERCIAL

## 2022-02-18 ENCOUNTER — PATIENT MESSAGE (OUTPATIENT)
Dept: ALLERGY | Facility: CLINIC | Age: 62
End: 2022-02-18
Payer: COMMERCIAL

## 2022-02-18 DIAGNOSIS — J45.40 MODERATE PERSISTENT ASTHMA WITHOUT COMPLICATION: Primary | ICD-10-CM

## 2022-02-21 RX ORDER — FLUTICASONE FUROATE, UMECLIDINIUM BROMIDE AND VILANTEROL TRIFENATATE 200; 62.5; 25 UG/1; UG/1; UG/1
1 POWDER RESPIRATORY (INHALATION) DAILY
Qty: 30 EACH | Refills: 3 | Status: ON HOLD | OUTPATIENT
Start: 2022-02-21 | End: 2022-03-14 | Stop reason: HOSPADM

## 2022-03-01 ENCOUNTER — PATIENT MESSAGE (OUTPATIENT)
Dept: PAIN MEDICINE | Facility: CLINIC | Age: 62
End: 2022-03-01
Payer: COMMERCIAL

## 2022-03-01 ENCOUNTER — TELEPHONE (OUTPATIENT)
Dept: PAIN MEDICINE | Facility: CLINIC | Age: 62
End: 2022-03-01
Payer: COMMERCIAL

## 2022-03-01 DIAGNOSIS — M47.816 LUMBAR FACET ARTHROPATHY: Primary | ICD-10-CM

## 2022-03-01 DIAGNOSIS — M47.816 LUMBAR SPONDYLOSIS: ICD-10-CM

## 2022-03-01 NOTE — TELEPHONE ENCOUNTER
Contacted pt. Appt for procedure scheduled 03/14/22 with Dr. Copeland . Will call post injection to get % relief . Went over instructions with pt and pt verbalized understanding.Instructions also mailed to pt.  All questions answered.

## 2022-03-02 ENCOUNTER — PATIENT MESSAGE (OUTPATIENT)
Dept: PAIN MEDICINE | Facility: HOSPITAL | Age: 62
End: 2022-03-02
Payer: COMMERCIAL

## 2022-03-03 RX ORDER — TIZANIDINE 4 MG/1
4 TABLET ORAL 2 TIMES DAILY PRN
Qty: 60 TABLET | Refills: 0 | Status: SHIPPED | OUTPATIENT
Start: 2022-03-03 | End: 2022-03-25

## 2022-03-07 NOTE — PRE-PROCEDURE INSTRUCTIONS
Spoke with patient regarding procedure scheduled on 3.14    Arrival time 1110    Has patient been sick with fever or on antibiotics within the last 7 days? No    Does the patient have any open wounds, sores or rashes? No    Does the patient have any recent fractures? no    Has patient received a vaccination within the last 7 days? No    Received the COVID vaccination? yes    Has the patient stopped all medications as directed? Na    Does patient have a pacemaker and or defibrillator? no    Does the patient have a ride to and from procedure and someone reliable to remain with patient?  keon    Is the patient diabetic? no    Does the patient have sleep apnea? Or use O2 at home? No and no     Is the patient receiving sedation? yes    Is the patient instructed to remain NPO beginning at midnight the night before their procedure? yes    Procedure location confirmed with patient? Yes    Covid- Denies signs/symptoms. Instructed to notify PAT/MD if any changes.

## 2022-03-09 ENCOUNTER — PATIENT MESSAGE (OUTPATIENT)
Dept: PAIN MEDICINE | Facility: HOSPITAL | Age: 62
End: 2022-03-09
Payer: COMMERCIAL

## 2022-03-14 ENCOUNTER — HOSPITAL ENCOUNTER (OUTPATIENT)
Facility: HOSPITAL | Age: 62
Discharge: HOME | End: 2022-03-14
Attending: ANESTHESIOLOGY | Admitting: ANESTHESIOLOGY
Payer: COMMERCIAL

## 2022-03-14 VITALS
BODY MASS INDEX: 28.04 KG/M2 | WEIGHT: 168.31 LBS | OXYGEN SATURATION: 94 % | HEART RATE: 70 BPM | TEMPERATURE: 97 F | SYSTOLIC BLOOD PRESSURE: 166 MMHG | HEIGHT: 65 IN | RESPIRATION RATE: 12 BRPM | DIASTOLIC BLOOD PRESSURE: 83 MMHG

## 2022-03-14 DIAGNOSIS — M47.816 LUMBAR FACET ARTHROPATHY: Primary | ICD-10-CM

## 2022-03-14 PROCEDURE — 63600175 PHARM REV CODE 636 W HCPCS: Performed by: ANESTHESIOLOGY

## 2022-03-14 PROCEDURE — 64493 INJ PARAVERT F JNT L/S 1 LEV: CPT | Mod: 50 | Performed by: ANESTHESIOLOGY

## 2022-03-14 PROCEDURE — 64495 INJ PARAVERT F JNT L/S 3 LEV: CPT | Mod: LT

## 2022-03-14 PROCEDURE — 64494 INJ PARAVERT F JNT L/S 2 LEV: CPT | Mod: LT

## 2022-03-14 PROCEDURE — 64494 PR INJ DX/THER AGNT PARAVERT FACET JOINT,IMG GUIDE,LUMBAR/SAC, 2ND LEVEL: ICD-10-PCS | Mod: 50,,, | Performed by: ANESTHESIOLOGY

## 2022-03-14 PROCEDURE — 25000003 PHARM REV CODE 250: Performed by: ANESTHESIOLOGY

## 2022-03-14 PROCEDURE — 64493 PR INJ DX/THER AGNT PARAVERT FACET JOINT,IMG GUIDE,LUMBAR/SAC,1ST LVL: ICD-10-PCS | Mod: 50,,, | Performed by: ANESTHESIOLOGY

## 2022-03-14 PROCEDURE — 64493 INJ PARAVERT F JNT L/S 1 LEV: CPT | Mod: 50,,, | Performed by: ANESTHESIOLOGY

## 2022-03-14 PROCEDURE — 64494 INJ PARAVERT F JNT L/S 2 LEV: CPT | Mod: 50 | Performed by: ANESTHESIOLOGY

## 2022-03-14 PROCEDURE — 64494 INJ PARAVERT F JNT L/S 2 LEV: CPT | Mod: 50,,, | Performed by: ANESTHESIOLOGY

## 2022-03-14 RX ORDER — MIDAZOLAM HYDROCHLORIDE 1 MG/ML
INJECTION, SOLUTION INTRAMUSCULAR; INTRAVENOUS
Status: DISCONTINUED | OUTPATIENT
Start: 2022-03-14 | End: 2022-03-14 | Stop reason: HOSPADM

## 2022-03-14 RX ORDER — BUPIVACAINE HYDROCHLORIDE 5 MG/ML
INJECTION, SOLUTION EPIDURAL; INTRACAUDAL
Status: DISCONTINUED | OUTPATIENT
Start: 2022-03-14 | End: 2022-03-14 | Stop reason: HOSPADM

## 2022-03-14 RX ORDER — FENTANYL CITRATE 50 UG/ML
INJECTION, SOLUTION INTRAMUSCULAR; INTRAVENOUS
Status: DISCONTINUED | OUTPATIENT
Start: 2022-03-14 | End: 2022-03-14 | Stop reason: HOSPADM

## 2022-03-14 NOTE — OP NOTE
Procedure: Lumbar Medial Branch Block under Fluoroscopic Guidance    Side: bilateral     Level:  Sacral ala (Corresponding to the L5 dorsal ramus), L5 transverse process (Corresponding to the L4 medial branch) and L4 transverse process (Corresponding to the L3 medial branch)     PROCEDURE DATE: 3/14/2022    Pre-operative Diagnosis: Lumbar Spondylosis  Post-operative Diagnosis: Lumbar Spondylosis    Provider: Lei Copeland MD  Assistant(s): none    Anesthesia: Local, IV Sedation    >> 2 mg of VERSED    >> 100 mcg of FENTANYL     Indication: Low back pain without radiculopathy. Symptoms unresponsive to conservative treatments. Fluoroscopy was used to optimize visualization of needle placement and to maximize safety.     Procedure Description / Technique:  The patient was seen and identified in the preoperative area. Risks, benefits, complications, and alternatives were discussed with the patient. The patient agreed to proceed with the procedure and signed the consent. The site and side of the procedure was identified and marked. An iv was started.     The patient was taken to the procedural suite and positioned in prone orientation on the procedure table. A pillow was placed under the abdomen to reduce lumbar lordosis. A time out was performed. The procedure, site, side, and allergies were stated and agreed to by all present. The lumbosacral area was widely prepped with ChloraPrep. The procedural site was draped in usual sterile fashion. Vital signs were closely monitored throughout this procedure. Conscious sedation was used for this procedure to decrease patient anxiety.    The Right sacral ala and superior articular process was identified and marked on AP fluoroscopic imaging. Subcutaneous tissues were localized using 1% PF Lidocaine to improve patient comfort. A 25 gauge 3.5 inch spinal needle was advance until the needle rested on OS at the interface of the sacral ala and the base of the sacral superior articular  "process. After negative aspiration, 0.75 mL of a solution containing 5 mL of 0.25% Bupivacaine was injected. No pain or paresthesia was noted on injection. After right side injection, the fluoroscope was obliqued to the Right until the cristal dog outline of the L5 vertebrae came into view. The spinal needle was advanced to the "eye of the cristal dog" and after negative aspiration a 0.75 mL of the above noted solution was injected. No pain or paresthesia was noted. This technique was repeated at each of the above noted levels. The spinal needle was removed intact following injection at each targeted site. The stylet was replaced prior to needle removal at each site.    The Left sacral ala and superior articular process was identified and marked on AP fluoroscopic imaging. Subcutaneous tissues were localized using 1% PF Lidocaine to improve patient comfort. A 25 gauge 3.5 inch spinal needle was advance until the needle rested on OS at the interface of the sacral ala and the base of the sacral superior articular process. After negative aspiration, 0.75 mL of a solution containing 5 mL of 0.25% Bupivacaine was injected. No pain or paresthesia was noted on injection. After left side injection, the fluoroscope was obliqued to the Left until the cristal dog outline of the L5 vertebrae came into view. The spinal needle was advanced to the "eye of the cristal dog" and after negative aspiration a 0.75 mL of the above noted solution was injected. No pain or paresthesia was noted. This technique was repeated at each of the above noted levels. The spinal needle was removed intact following injection at each targeted site. The stylet was replaced prior to needle removal at each site.    Description of Findings: Not applicable    Prosthetic devices, grafts, tissues, or devices implanted: None    Specimen Removed: No    ESTIMATED BLOOD LOSS: minimal    COMPLICATIONS: None    DISPOSITION / PLANS: The patient was transferred to the " recovery area in a stable condition for observation. The patient was reexamined prior to discharge. There was no evidence of acute neurologic injury following the procedure.  Patient was discharged from the recovery room after meeting discharge criteria. Home discharge instructions were given to the patient by the staff.

## 2022-03-14 NOTE — H&P
HPI  Patient presenting for Procedure(s) (LRB):  Block-nerve-medial branch-lumbar L3, 4 5 NO STEROIDS RN IV sedation (Bilateral)     Patient on Anti-coagulation No    No health changes since previous encounter    Past Medical History:   Diagnosis Date    Abnormal Pap smear of cervix     Allergy     Asthma     COPD (chronic obstructive pulmonary disease)     Malignant hyperthermia     pt's mother has hx of MH.  Pt denies any complications with general anesthesia    Recurrent upper respiratory infection (URI)      Past Surgical History:   Procedure Laterality Date    BONE SPUR EXCISION SHOULDER Right     CARPAL TUNNEL RELEASE Left      SECTION      x 2    CHOLECYSTECTOMY  2018    DILATION AND CURETTAGE OF UTERUS      EXAMINATION UNDER ANESTHESIA N/A 2021    Procedure: Exam Under Anesthesia;  Surgeon: Don Allen MD;  Location: [facility] OR;  Service: Oncology;  Laterality: N/A;    FOOT TENDON SURGERY Bilateral     HYSTERECTOMY  1999    fibroid    INJECTION OF ANESTHETIC AGENT AROUND MEDIAL BRANCH NERVES INNERVATING LUMBAR FACET JOINT Right 10/28/2021    Procedure: Block-nerve-medial branch-lumbar Right L3, 4,5 MBB with RN IV sedation;  Surgeon: Lei Copeland MD;  Location: [facility] PAIN MGT;  Service: Pain Management;  Laterality: Right;    INJECTION OF ANESTHETIC AGENT AROUND MEDIAL BRANCH NERVES INNERVATING LUMBAR FACET JOINT Bilateral 2021    Procedure: Block-nerve-medial branch-lumbar bilateral L3, 4,5 MBB RN IV sedation;  Surgeon: Lei Copeland MD;  Location: [facility] PAIN MGT;  Service: Pain Management;  Laterality: Bilateral;    LASER ABLATION N/A 2021    Procedure: ABLATION, USING LASER;  Surgeon: Don Allen MD;  Location: [facility] OR;  Service: Oncology;  Laterality: N/A;    MICRODISCECTOMY OF SPINE      OOPHORECTOMY Bilateral     TONSILLECTOMY      TRIGGER FINGER RELEASE Left     TUBAL LIGATION Bilateral      Review of patient's allergies indicates:   Allergen  "Reactions    Erythromycin Anaphylaxis    Macrolide antibiotics Anaphylaxis    Iodine and iodide containing products Hives    Levofloxacin      Pains in her tendons/joints        No current facility-administered medications on file prior to encounter.     Current Outpatient Medications on File Prior to Encounter   Medication Sig Dispense Refill    diphenhydrAMINE (BENADRYL) 25 mg capsule Take 25 mg by mouth. Before infusions      gabapentin (NEURONTIN) 300 MG capsule Take 300 mg by mouth every evening.       hydroCHLOROthiazide (HYDRODIURIL) 12.5 MG Tab Take 1 tablet (12.5 mg total) by mouth once daily. 30 tablet 11    Immune Globulin G, IGG,-PRO-IGA 10 % injection, Privigen, (PRIVIGEN) 10 % Soln Inject 300 mLs (30 g total) into the vein every 28 days. 300 mL 11    predniSONE (DELTASONE) 10 MG tablet TAKE 1 TABLET BY MOUTH EVERY DAY AS NEEDED 30 tablet 0    TRELEGY ELLIPTA 200-62.5-25 mcg inhaler INHALE 1 PUFF INTO THE LUNGS ONCE DAILY. 60 each 5    buPROPion (WELLBUTRIN SR) 150 MG TBSR 12 hr tablet Take 1 tablet (150 mg total) by mouth 2 (two) times daily. (Patient taking differently: Take 150 mg by mouth nightly.) 60 tablet PRN    fluticasone-umeclidin-vilanter (TRELEGY ELLIPTA) 200-62.5-25 mcg inhaler Inhale 1 puff into the lungs once daily. 30 each 3        PMHx, PSHx, Allergies, Medications reviewed in epic    ROS negative except pain complaints in HPI    OBJECTIVE:    BP (!) 166/72 (BP Location: Right arm, Patient Position: Sitting)   Pulse 72   Temp 97.2 °F (36.2 °C) (Temporal)   Resp 18   Ht 5' 5" (1.651 m)   Wt 76.4 kg (168 lb 5.1 oz)   SpO2 97%   Breastfeeding No   BMI 28.01 kg/m²     PHYSICAL EXAMINATION:    GENERAL: Well appearing, in no acute distress, alert and oriented x3.  PSYCH:  Mood and affect appropriate.  SKIN: Skin color, texture, turgor normal, no rashes or lesions which will impact the procedure.  CV: RRR with palpation of the radial artery.  PULM: No evidence of " respiratory difficulty, symmetric chest rise. Clear to auscultation.  NEURO: Cranial nerves grossly intact.    Plan:    Proceed with procedure as planned Procedure(s) (LRB):  Block-nerve-medial branch-lumbar L3, 4 5 NO STEROIDS RN IV sedation (Bilateral)    Lei Copeland MD  03/14/2022

## 2022-03-14 NOTE — DISCHARGE INSTRUCTIONS

## 2022-03-14 NOTE — DISCHARGE SUMMARY
[facility]  Discharge Note       Description of Procedure: Lumbar Medial Branch Block under Fluoroscopic Guidance    Procedure Date: 3/14/2022    Admit Date: 3/14/2022  Discharge Date: 3/14/2022     Attending Physician: Lei Copeland   Discharge Provider: Lei Copeland    Preoperative Diagnosis: Lumbar Facet Arthropathy     Postoperative Diagnosis: as above, same as preoperative diagnosis    Discharged Condition: Stable    Hospital Course: Patient was admitted for an outpatient procedure. The procedure was tolerated well with no complications.    Final Diagnoses: Same as principal problem.    Disposition: Home, self-care.    Follow up/Patient Instructions:  Follow-up in clinic in 2-3 weeks.    Medications: No medications were prescribed today. The patient was advised to resume normal medication regimen without change.  Specific information was provided regarding restarting any anticoagulant/s.    Discharge Procedure Orders (must include Diet, Follow-up, Activity):  Light activity for the remainder of the day, resume normal activity tomorrow. Resume normal diet. Follow-up in clinic in 2-3 weeks.

## 2022-03-15 ENCOUNTER — PATIENT MESSAGE (OUTPATIENT)
Dept: PAIN MEDICINE | Facility: CLINIC | Age: 62
End: 2022-03-15
Payer: COMMERCIAL

## 2022-03-15 ENCOUNTER — PATIENT MESSAGE (OUTPATIENT)
Dept: INTERNAL MEDICINE | Facility: CLINIC | Age: 62
End: 2022-03-15
Payer: COMMERCIAL

## 2022-03-15 DIAGNOSIS — M47.816 LUMBAR FACET ARTHROPATHY: Primary | ICD-10-CM

## 2022-03-15 NOTE — TELEPHONE ENCOUNTER
Please place next diagnostic Lumbar L3/4/5 MBB for Pt to complete so she can proceed with RFA's

## 2022-03-16 ENCOUNTER — PATIENT MESSAGE (OUTPATIENT)
Dept: ALLERGY | Facility: CLINIC | Age: 62
End: 2022-03-16
Payer: COMMERCIAL

## 2022-03-16 DIAGNOSIS — J45.40 MODERATE PERSISTENT ASTHMA WITHOUT COMPLICATION: Primary | ICD-10-CM

## 2022-03-16 DIAGNOSIS — J44.9 CHRONIC OBSTRUCTIVE PULMONARY DISEASE, UNSPECIFIED COPD TYPE: ICD-10-CM

## 2022-03-16 RX ORDER — FLUTICASONE FUROATE, UMECLIDINIUM BROMIDE AND VILANTEROL TRIFENATATE 200; 62.5; 25 UG/1; UG/1; UG/1
1 POWDER RESPIRATORY (INHALATION) DAILY
Qty: 30 EACH | Refills: 5 | Status: SHIPPED | OUTPATIENT
Start: 2022-03-16 | End: 2022-10-24

## 2022-03-18 NOTE — PRE-PROCEDURE INSTRUCTIONS
Spoke with patient regarding procedure scheduled on 3.21     Arrival time 1140     Has patient been sick with fever or on antibiotics within the last 7 days? No     Does the patient have any open wounds, sores or rashes? No     Does the patient have any recent fractures? no     Has patient received a vaccination within the last 7 days? No     Received the COVID vaccination? yes     Has the patient stopped all medications as directed? na     Does patient have a pacemaker and or defibrillator? no     Does the patient have a ride to and from procedure and someone reliable to remain with patient?  keon     Is the patient diabetic?no     Does the patient have sleep apnea? Or use O2 at home? No and no      Is the patient receiving sedation? yes     Is the patient instructed to remain NPO beginning at midnight the night before their procedure? yes     Procedure location confirmed with patient? Yes     Covid- Denies signs/symptoms. Instructed to notify PAT/MD if any changes

## 2022-03-21 ENCOUNTER — HOSPITAL ENCOUNTER (OUTPATIENT)
Facility: HOSPITAL | Age: 62
Discharge: HOME | End: 2022-03-21
Attending: ANESTHESIOLOGY | Admitting: ANESTHESIOLOGY
Payer: COMMERCIAL

## 2022-03-21 VITALS
RESPIRATION RATE: 15 BRPM | HEIGHT: 65 IN | TEMPERATURE: 98 F | DIASTOLIC BLOOD PRESSURE: 83 MMHG | OXYGEN SATURATION: 95 % | SYSTOLIC BLOOD PRESSURE: 147 MMHG | BODY MASS INDEX: 28.08 KG/M2 | WEIGHT: 168.56 LBS | HEART RATE: 65 BPM

## 2022-03-21 DIAGNOSIS — M47.816 LUMBAR FACET ARTHROPATHY: Primary | ICD-10-CM

## 2022-03-21 PROCEDURE — 64495 INJ PARAVERT F JNT L/S 3 LEV: CPT | Mod: 50

## 2022-03-21 PROCEDURE — 64493 INJ PARAVERT F JNT L/S 1 LEV: CPT | Mod: 50 | Performed by: ANESTHESIOLOGY

## 2022-03-21 PROCEDURE — 25000003 PHARM REV CODE 250: Performed by: ANESTHESIOLOGY

## 2022-03-21 PROCEDURE — 64493 INJ PARAVERT F JNT L/S 1 LEV: CPT | Mod: 50,,, | Performed by: ANESTHESIOLOGY

## 2022-03-21 PROCEDURE — 64493 PR INJ DX/THER AGNT PARAVERT FACET JOINT,IMG GUIDE,LUMBAR/SAC,1ST LVL: ICD-10-PCS | Mod: 50,,, | Performed by: ANESTHESIOLOGY

## 2022-03-21 PROCEDURE — 64494 PR INJ DX/THER AGNT PARAVERT FACET JOINT,IMG GUIDE,LUMBAR/SAC, 2ND LEVEL: ICD-10-PCS | Mod: 50,,, | Performed by: ANESTHESIOLOGY

## 2022-03-21 PROCEDURE — 64494 INJ PARAVERT F JNT L/S 2 LEV: CPT | Mod: 50,,, | Performed by: ANESTHESIOLOGY

## 2022-03-21 PROCEDURE — 63600175 PHARM REV CODE 636 W HCPCS: Performed by: ANESTHESIOLOGY

## 2022-03-21 PROCEDURE — 64494 INJ PARAVERT F JNT L/S 2 LEV: CPT | Mod: 50 | Performed by: ANESTHESIOLOGY

## 2022-03-21 RX ORDER — FENTANYL CITRATE 50 UG/ML
INJECTION, SOLUTION INTRAMUSCULAR; INTRAVENOUS
Status: DISCONTINUED | OUTPATIENT
Start: 2022-03-21 | End: 2022-03-21 | Stop reason: HOSPADM

## 2022-03-21 RX ORDER — MIDAZOLAM HYDROCHLORIDE 1 MG/ML
INJECTION, SOLUTION INTRAMUSCULAR; INTRAVENOUS
Status: DISCONTINUED | OUTPATIENT
Start: 2022-03-21 | End: 2022-03-21 | Stop reason: HOSPADM

## 2022-03-21 RX ORDER — BUPIVACAINE HYDROCHLORIDE 5 MG/ML
INJECTION, SOLUTION EPIDURAL; INTRACAUDAL
Status: DISCONTINUED | OUTPATIENT
Start: 2022-03-21 | End: 2022-03-21 | Stop reason: HOSPADM

## 2022-03-21 NOTE — OP NOTE
Procedure: Lumbar Medial Branch Block under Fluoroscopic Guidance    Side: bilateral     Level:  Sacral ala (Corresponding to the L5 dorsal ramus), L5 transverse process (Corresponding to the L4 medial branch) and L4 transverse process (Corresponding to the L3 medial branch)     PROCEDURE DATE: 3/21/2022    Pre-operative Diagnosis: Lumbar Spondylosis  Post-operative Diagnosis: Lumbar Spondylosis    Provider: Lei Copeland MD  Assistant(s): none    Anesthesia: Local, IV Sedation    >> 2 mg of VERSED    >> 100 mcg of FENTANYL     Indication: Low back pain without radiculopathy. Symptoms unresponsive to conservative treatments. Fluoroscopy was used to optimize visualization of needle placement and to maximize safety.     Procedure Description / Technique:  The patient was seen and identified in the preoperative area. Risks, benefits, complications, and alternatives were discussed with the patient. The patient agreed to proceed with the procedure and signed the consent. The site and side of the procedure was identified and marked. An iv was started.     The patient was taken to the procedural suite and positioned in prone orientation on the procedure table. A pillow was placed under the abdomen to reduce lumbar lordosis. A time out was performed. The procedure, site, side, and allergies were stated and agreed to by all present. The lumbosacral area was widely prepped with ChloraPrep. The procedural site was draped in usual sterile fashion. Vital signs were closely monitored throughout this procedure. Conscious sedation was used for this procedure to decrease patient anxiety.    The Right sacral ala and superior articular process was identified and marked on AP fluoroscopic imaging. Subcutaneous tissues were localized using 1% PF Lidocaine to improve patient comfort. A 25 gauge 3.5 inch spinal needle was advance until the needle rested on OS at the interface of the sacral ala and the base of the sacral superior articular  "process. After negative aspiration, 0.75 mL of a solution containing 5 mL of 0.25% Bupivacaine was injected. No pain or paresthesia was noted on injection. After right side injection, the fluoroscope was obliqued to the Right until the cristal dog outline of the L5 vertebrae came into view. The spinal needle was advanced to the "eye of the cristal dog" and after negative aspiration a 0.75 mL of the above noted solution was injected. No pain or paresthesia was noted. This technique was repeated at each of the above noted levels. The spinal needle was removed intact following injection at each targeted site. The stylet was replaced prior to needle removal at each site.    The Left sacral ala and superior articular process was identified and marked on AP fluoroscopic imaging. Subcutaneous tissues were localized using 1% PF Lidocaine to improve patient comfort. A 25 gauge 3.5 inch spinal needle was advance until the needle rested on OS at the interface of the sacral ala and the base of the sacral superior articular process. After negative aspiration, 0.75 mL of a solution containing 5 mL of 0.25% Bupivacaine was injected. No pain or paresthesia was noted on injection. After left side injection, the fluoroscope was obliqued to the Left until the cristal dog outline of the L5 vertebrae came into view. The spinal needle was advanced to the "eye of the cristal dog" and after negative aspiration a 0.75 mL of the above noted solution was injected. No pain or paresthesia was noted. This technique was repeated at each of the above noted levels. The spinal needle was removed intact following injection at each targeted site. The stylet was replaced prior to needle removal at each site.    Description of Findings: Not applicable    Prosthetic devices, grafts, tissues, or devices implanted: None    Specimen Removed: No    ESTIMATED BLOOD LOSS: minimal    COMPLICATIONS: None    DISPOSITION / PLANS: The patient was transferred to the " recovery area in a stable condition for observation. The patient was reexamined prior to discharge. There was no evidence of acute neurologic injury following the procedure.  Patient was discharged from the recovery room after meeting discharge criteria. Home discharge instructions were given to the patient by the staff.

## 2022-03-21 NOTE — DISCHARGE SUMMARY
[facility]  Discharge Note       Description of Procedure: Lumbar Medial Branch Block under Fluoroscopic Guidance    Procedure Date: 3/21/2022    Admit Date: 3/21/2022  Discharge Date: 3/21/2022     Attending Physician: Lei Copeland   Discharge Provider: Lei Copeland    Preoperative Diagnosis: Lumbar Facet Arthropathy     Postoperative Diagnosis: as above, same as preoperative diagnosis    Discharged Condition: Stable    Hospital Course: Patient was admitted for an outpatient procedure. The procedure was tolerated well with no complications.    Final Diagnoses: Same as principal problem.    Disposition: Home, self-care.    Follow up/Patient Instructions:  Follow-up in clinic in 2-3 weeks.    Medications: No medications were prescribed today. The patient was advised to resume normal medication regimen without change.  Specific information was provided regarding restarting any anticoagulant/s.    Discharge Procedure Orders (must include Diet, Follow-up, Activity):  Light activity for the remainder of the day, resume normal activity tomorrow. Resume normal diet. Follow-up in clinic in 2-3 weeks.

## 2022-03-21 NOTE — DISCHARGE INSTRUCTIONS

## 2022-03-21 NOTE — H&P
HPI  Patient presenting for Procedure(s) (LRB):  Block-nerve-medial branch-lumbar bilateral L3, 4,5 RN IV sedation NO STERIOD DIAGNOSTIC ONLY (Bilateral)     Patient on Anti-coagulation No    No health changes since previous encounter    Past Medical History:   Diagnosis Date    Abnormal Pap smear of cervix     Allergy     Asthma     COPD (chronic obstructive pulmonary disease)     Malignant hyperthermia     pt's mother has hx of MH.  Pt denies any complications with general anesthesia    Recurrent upper respiratory infection (URI)      Past Surgical History:   Procedure Laterality Date    BONE SPUR EXCISION SHOULDER Right     CARPAL TUNNEL RELEASE Left      SECTION      x 2    CHOLECYSTECTOMY  2018    DILATION AND CURETTAGE OF UTERUS      EXAMINATION UNDER ANESTHESIA N/A 2021    Procedure: Exam Under Anesthesia;  Surgeon: Don Allen MD;  Location: [facility] OR;  Service: Oncology;  Laterality: N/A;    FOOT TENDON SURGERY Bilateral     HYSTERECTOMY      fibroid    INJECTION OF ANESTHETIC AGENT AROUND MEDIAL BRANCH NERVES INNERVATING LUMBAR FACET JOINT Right 10/28/2021    Procedure: Block-nerve-medial branch-lumbar Right L3, 4,5 MBB with RN IV sedation;  Surgeon: Lei Copeland MD;  Location: [facility] PAIN MGT;  Service: Pain Management;  Laterality: Right;    INJECTION OF ANESTHETIC AGENT AROUND MEDIAL BRANCH NERVES INNERVATING LUMBAR FACET JOINT Bilateral 2021    Procedure: Block-nerve-medial branch-lumbar bilateral L3, 4,5 MBB RN IV sedation;  Surgeon: Lei Copeland MD;  Location: [facility] PAIN MGT;  Service: Pain Management;  Laterality: Bilateral;    INJECTION OF ANESTHETIC AGENT AROUND MEDIAL BRANCH NERVES INNERVATING LUMBAR FACET JOINT Bilateral 3/14/2022    Procedure: Block-nerve-medial branch-lumbar L3, 4 5 NO STEROIDS RN IV sedation;  Surgeon: Lei Copeland MD;  Location: [facility] PAIN MGT;  Service: Pain Management;  Laterality: Bilateral;    LASER ABLATION N/A 2021     "Procedure: ABLATION, USING LASER;  Surgeon: Don Allen MD;  Location: [facility] OR;  Service: Oncology;  Laterality: N/A;    MICRODISCECTOMY OF SPINE      OOPHORECTOMY Bilateral 1999    TONSILLECTOMY      TRIGGER FINGER RELEASE Left     TUBAL LIGATION Bilateral 1982     Review of patient's allergies indicates:   Allergen Reactions    Erythromycin Anaphylaxis    Macrolide antibiotics Anaphylaxis    Iodine and iodide containing products Hives    Levofloxacin      Pains in her tendons/joints        No current facility-administered medications on file prior to encounter.     Current Outpatient Medications on File Prior to Encounter   Medication Sig Dispense Refill    diphenhydrAMINE (BENADRYL) 25 mg capsule Take 25 mg by mouth. Before infusions      gabapentin (NEURONTIN) 300 MG capsule Take 300 mg by mouth every evening.       hydroCHLOROthiazide (HYDRODIURIL) 12.5 MG Tab Take 1 tablet (12.5 mg total) by mouth once daily. 30 tablet 11    Immune Globulin G, IGG,-PRO-IGA 10 % injection, Privigen, (PRIVIGEN) 10 % Soln Inject 300 mLs (30 g total) into the vein every 28 days. 300 mL 11    predniSONE (DELTASONE) 10 MG tablet TAKE 1 TABLET BY MOUTH EVERY DAY AS NEEDED 30 tablet 0    tiZANidine (ZANAFLEX) 4 MG tablet Take 1 tablet (4 mg total) by mouth 2 (two) times daily as needed. 60 tablet 0    buPROPion (WELLBUTRIN SR) 150 MG TBSR 12 hr tablet Take 1 tablet (150 mg total) by mouth 2 (two) times daily. (Patient taking differently: Take 150 mg by mouth nightly.) 60 tablet PRN        PMHx, PSHx, Allergies, Medications reviewed in epic    ROS negative except pain complaints in HPI    OBJECTIVE:    BP (!) 147/82 (BP Location: Right arm, Patient Position: Sitting)   Pulse 69   Temp 97.4 °F (36.3 °C) (Temporal)   Resp 18   Ht 5' 5" (1.651 m)   Wt 76.5 kg (168 lb 8.7 oz)   SpO2 98%   Breastfeeding No   BMI 28.05 kg/m²     PHYSICAL EXAMINATION:    GENERAL: Well appearing, in no acute distress, alert and " oriented x3.  PSYCH:  Mood and affect appropriate.  SKIN: Skin color, texture, turgor normal, no rashes or lesions which will impact the procedure.  CV: RRR with palpation of the radial artery.  PULM: No evidence of respiratory difficulty, symmetric chest rise. Clear to auscultation.  NEURO: Cranial nerves grossly intact.    Plan:    Proceed with procedure as planned Procedure(s) (LRB):  Block-nerve-medial branch-lumbar bilateral L3, 4,5 RN IV sedation NO STERIOD DIAGNOSTIC ONLY (Bilateral)    Lei Copeland MD  03/21/2022

## 2022-03-22 ENCOUNTER — PATIENT MESSAGE (OUTPATIENT)
Dept: PAIN MEDICINE | Facility: CLINIC | Age: 62
End: 2022-03-22
Payer: COMMERCIAL

## 2022-04-04 NOTE — PRE-PROCEDURE INSTRUCTIONS
Spoke with patient regarding procedure scheduled on 4.7    Arrival time 1020    Has patient been sick with fever or on antibiotics within the last 7 days? No    Does the patient have any open wounds, sores or rashes? No    Does the patient have any recent fractures? no    Has patient received a vaccination within the last 7 days? No    Received the COVID vaccination? yes    Has the patient stopped all medications as directed? Na    Does patient have a pacemaker and or defibrillator? no    Does the patient have a ride to and from procedure and someone reliable to remain with patient?  keon    Is the patient diabetic? no    Does the patient have sleep apnea? Or use O2 at home? No and no     Is the patient receiving sedation? yes    Is the patient instructed to remain NPO beginning at midnight the night before their procedure? yes    Procedure location confirmed with patient? Yes    Covid- Denies signs/symptoms. Instructed to notify PAT/MD if any changes.

## 2022-04-07 ENCOUNTER — HOSPITAL ENCOUNTER (OUTPATIENT)
Facility: HOSPITAL | Age: 62
Discharge: HOME | End: 2022-04-07
Attending: ANESTHESIOLOGY | Admitting: ANESTHESIOLOGY
Payer: COMMERCIAL

## 2022-04-07 VITALS
SYSTOLIC BLOOD PRESSURE: 138 MMHG | HEART RATE: 61 BPM | DIASTOLIC BLOOD PRESSURE: 79 MMHG | TEMPERATURE: 98 F | BODY MASS INDEX: 28.1 KG/M2 | WEIGHT: 168.63 LBS | OXYGEN SATURATION: 95 % | HEIGHT: 65 IN | RESPIRATION RATE: 15 BRPM

## 2022-04-07 DIAGNOSIS — M47.816 LUMBAR FACET ARTHROPATHY: Primary | ICD-10-CM

## 2022-04-07 PROCEDURE — 63600175 PHARM REV CODE 636 W HCPCS: Performed by: ANESTHESIOLOGY

## 2022-04-07 PROCEDURE — 64636 PR DESTROY L/S FACET JNT ADDL: ICD-10-PCS | Mod: 50,,, | Performed by: ANESTHESIOLOGY

## 2022-04-07 PROCEDURE — 64635 DESTROY LUMB/SAC FACET JNT: CPT | Mod: 50 | Performed by: ANESTHESIOLOGY

## 2022-04-07 PROCEDURE — 99153 MOD SED SAME PHYS/QHP EA: CPT | Performed by: ANESTHESIOLOGY

## 2022-04-07 PROCEDURE — 64636 DESTROY L/S FACET JNT ADDL: CPT | Mod: 50 | Performed by: ANESTHESIOLOGY

## 2022-04-07 PROCEDURE — 64635 PR DESTROY LUMB/SAC FACET JNT: ICD-10-PCS | Mod: 50,,, | Performed by: ANESTHESIOLOGY

## 2022-04-07 PROCEDURE — 25000003 PHARM REV CODE 250: Performed by: ANESTHESIOLOGY

## 2022-04-07 PROCEDURE — 99152 MOD SED SAME PHYS/QHP 5/>YRS: CPT | Performed by: ANESTHESIOLOGY

## 2022-04-07 PROCEDURE — 64635 DESTROY LUMB/SAC FACET JNT: CPT | Mod: 50,,, | Performed by: ANESTHESIOLOGY

## 2022-04-07 PROCEDURE — 64636 DESTROY L/S FACET JNT ADDL: CPT | Mod: 50,,, | Performed by: ANESTHESIOLOGY

## 2022-04-07 RX ORDER — MIDAZOLAM HYDROCHLORIDE 1 MG/ML
INJECTION, SOLUTION INTRAMUSCULAR; INTRAVENOUS
Status: DISCONTINUED | OUTPATIENT
Start: 2022-04-07 | End: 2022-04-07 | Stop reason: HOSPADM

## 2022-04-07 RX ORDER — FENTANYL CITRATE 50 UG/ML
INJECTION, SOLUTION INTRAMUSCULAR; INTRAVENOUS
Status: DISCONTINUED | OUTPATIENT
Start: 2022-04-07 | End: 2022-04-07 | Stop reason: HOSPADM

## 2022-04-07 RX ORDER — BUPIVACAINE HYDROCHLORIDE 5 MG/ML
INJECTION, SOLUTION EPIDURAL; INTRACAUDAL
Status: DISCONTINUED | OUTPATIENT
Start: 2022-04-07 | End: 2022-04-07 | Stop reason: HOSPADM

## 2022-04-07 RX ORDER — LIDOCAINE HYDROCHLORIDE 20 MG/ML
INJECTION, SOLUTION EPIDURAL; INFILTRATION; INTRACAUDAL; PERINEURAL
Status: DISCONTINUED | OUTPATIENT
Start: 2022-04-07 | End: 2022-04-07 | Stop reason: HOSPADM

## 2022-04-07 RX ORDER — METHYLPREDNISOLONE ACETATE 40 MG/ML
INJECTION, SUSPENSION INTRA-ARTICULAR; INTRALESIONAL; INTRAMUSCULAR; SOFT TISSUE
Status: DISCONTINUED | OUTPATIENT
Start: 2022-04-07 | End: 2022-04-07 | Stop reason: HOSPADM

## 2022-04-07 NOTE — H&P
HPI  Patient presenting for Procedure(s) (LRB):  RADIOFREQUENCY THERMAL COAGULATION Right L3, 4,5 mbb RFA RN IV sedation (Right)     Patient on Anti-coagulation No    No health changes since previous encounter    Past Medical History:   Diagnosis Date    Abnormal Pap smear of cervix     Allergy     Asthma     COPD (chronic obstructive pulmonary disease)     Malignant hyperthermia     pt's mother has hx of MH.  Pt denies any complications with general anesthesia    Recurrent upper respiratory infection (URI)      Past Surgical History:   Procedure Laterality Date    BONE SPUR EXCISION SHOULDER Right     CARPAL TUNNEL RELEASE Left      SECTION      x 2    CHOLECYSTECTOMY  2018    DILATION AND CURETTAGE OF UTERUS      EXAMINATION UNDER ANESTHESIA N/A 2021    Procedure: Exam Under Anesthesia;  Surgeon: Don Allen MD;  Location: [facility] OR;  Service: Oncology;  Laterality: N/A;    FOOT TENDON SURGERY Bilateral     HYSTERECTOMY  1999    fibroid    INJECTION OF ANESTHETIC AGENT AROUND MEDIAL BRANCH NERVES INNERVATING LUMBAR FACET JOINT Right 10/28/2021    Procedure: Block-nerve-medial branch-lumbar Right L3, 4,5 MBB with RN IV sedation;  Surgeon: Lei Copeland MD;  Location: [facility] PAIN MGT;  Service: Pain Management;  Laterality: Right;    INJECTION OF ANESTHETIC AGENT AROUND MEDIAL BRANCH NERVES INNERVATING LUMBAR FACET JOINT Bilateral 2021    Procedure: Block-nerve-medial branch-lumbar bilateral L3, 4,5 MBB RN IV sedation;  Surgeon: Lei Copeland MD;  Location: [facility] PAIN MGT;  Service: Pain Management;  Laterality: Bilateral;    INJECTION OF ANESTHETIC AGENT AROUND MEDIAL BRANCH NERVES INNERVATING LUMBAR FACET JOINT Bilateral 3/14/2022    Procedure: Block-nerve-medial branch-lumbar L3, 4 5 NO STEROIDS RN IV sedation;  Surgeon: Lei Copeland MD;  Location: [facility] PAIN MGT;  Service: Pain Management;  Laterality: Bilateral;    INJECTION OF ANESTHETIC AGENT AROUND MEDIAL BRANCH NERVES  "INNERVATING LUMBAR FACET JOINT Bilateral 3/21/2022    Procedure: Block-nerve-medial branch-lumbar bilateral L3, 4,5 RN IV sedation NO STERIOD DIAGNOSTIC ONLY;  Surgeon: Lei Copeland MD;  Location: [facility] PAIN MGT;  Service: Pain Management;  Laterality: Bilateral;    LASER ABLATION N/A 6/16/2021    Procedure: ABLATION, USING LASER;  Surgeon: Don Allen MD;  Location: [facility] OR;  Service: Oncology;  Laterality: N/A;    MICRODISCECTOMY OF SPINE      OOPHORECTOMY Bilateral 1999    TONSILLECTOMY      TRIGGER FINGER RELEASE Left     TUBAL LIGATION Bilateral 1982     Review of patient's allergies indicates:   Allergen Reactions    Erythromycin Anaphylaxis    Macrolide antibiotics Anaphylaxis    Iodine and iodide containing products Hives    Levofloxacin      Pains in her tendons/joints        No current facility-administered medications on file prior to encounter.     Current Outpatient Medications on File Prior to Encounter   Medication Sig Dispense Refill    diphenhydrAMINE (BENADRYL) 25 mg capsule Take 25 mg by mouth. Before infusions      gabapentin (NEURONTIN) 300 MG capsule Take 300 mg by mouth every evening.       hydroCHLOROthiazide (HYDRODIURIL) 12.5 MG Tab Take 1 tablet (12.5 mg total) by mouth once daily. 30 tablet 11    Immune Globulin G, IGG,-PRO-IGA 10 % injection, Privigen, (PRIVIGEN) 10 % Soln Inject 300 mLs (30 g total) into the vein every 28 days. 300 mL 11    predniSONE (DELTASONE) 10 MG tablet TAKE 1 TABLET BY MOUTH EVERY DAY AS NEEDED 30 tablet 0    buPROPion (WELLBUTRIN SR) 150 MG TBSR 12 hr tablet Take 1 tablet (150 mg total) by mouth 2 (two) times daily. (Patient taking differently: Take 150 mg by mouth nightly.) 60 tablet PRN        PMHx, PSHx, Allergies, Medications reviewed in epic    ROS negative except pain complaints in HPI    OBJECTIVE:    BP (!) 140/86 (BP Location: Right arm, Patient Position: Sitting)   Pulse 73   Temp 97.9 °F (36.6 °C) (Temporal)   Resp 18   Ht 5' 5" " (1.651 m)   Wt 76.5 kg (168 lb 10.4 oz)   SpO2 95%   Breastfeeding No   BMI 28.07 kg/m²     PHYSICAL EXAMINATION:    GENERAL: Well appearing, in no acute distress, alert and oriented x3.  PSYCH:  Mood and affect appropriate.  SKIN: Skin color, texture, turgor normal, no rashes or lesions which will impact the procedure.  CV: RRR with palpation of the radial artery.  PULM: No evidence of respiratory difficulty, symmetric chest rise. Clear to auscultation.  NEURO: Cranial nerves grossly intact.    Plan:    Proceed with procedure as planned Procedure(s) (LRB):  RADIOFREQUENCY THERMAL COAGULATION Right L3, 4,5 mbb RFA RN IV sedation (Right)    Lei Copeland MD  04/07/2022

## 2022-04-07 NOTE — OP NOTE
PROCEDURE: Bilateral Lumbar medial branch radiofrequency (RF) ablation under fluoroscopic guidance     LEVEL:   Sacral ala (Corresponding to the L5 dorsal ramus),   L5 transverse process (Corresponding to the L4 medial branch),   L4 transverse process (Corresponding to the L3 medial branch)     PROCEDURE DATE: 04/07/2022     PREOPERATIVE DIAGNOSIS: Lumbar spondylosis  POSTOPERATIVE DIAGNOSIS: Lumbar spondylosis      PROVIDER: Lei Copeland MD  Assistant(s): None     ANESTHESIA: Local, IV Sedation    >> 2 mg of VERSED    >> 150 mcg of FENTANYL      INDICATION: The patient has low back pain without radiculopathy symptoms unresponsive to conservative treatments. Fluoroscopy was used to optimize visualization of needle placement and to maximize safety.         PROCEDURE DESCRIPTION / TECHNIQUE:   The patient was seen and identified in the preoperative area. Risks, benefits, complications, and alternatives were discussed with the patient. The patient agreed to proceed with the procedure and signed the consent. The site and side of the procedure was identified and marked. An iv was started.     The patient was taken to the procedural suite. The patient was positioned in prone orientation on procedure table and a pillow was placed under the abdomen to reduce lumbar lordosis. A time out was performed prior to any intervention. The procedure, site, side, and allergies were stated and agreed to by all present. The lumbosacral area was widely prepped with ChloraPrep. The procedural site was draped in usual sterile fashion. Vital signs were closely monitored throughout this procedure. Conscious sedation was used for this procedure to decrease patient anxiety.     Using AP fluoroscopy, the junction of the vetebral transverse process (TP) and the superior articular process (SAP) at the above noted levels was identified. The skin caudal and oblique to the SAP-TP junctions described above was marked. Superficial tissues were  localized with 2% PF Lidocaine at each level. [employer] 100 mm 20-gauge radiofrequency cannulae with curved 10-mm active tips were advanced to the above noted TP-SAP junction until osseus contact was made. The needle was walked off of the sulcus. The fluoroscope was obliqued to the ipsilateral side and the needles were repositioned as needed until the active tip was aligned along the base of the SAP and the needle tip met the anterior fluoroscopic shadow of the SAP. Lateral fluoroscopic imaging was captured and the needle tips were adjusted such that the tips extended into the SAP shadow, but not beyond the SAP silhouette into the intervertebral foramen. After satisfactory cannula positioning was realized in true lateral orientation, the needle stylets were removed, RF electrodes were introduced, and sensory and motor testing was performed.      Nerves were tested sequentially. Motor testing was performed at 2 Hz up to 1.5 volts with elicitation of mulitifidus muscle contraction and with no radicular pain, paresthesias, or distal muscle contraction beyond the buttocks produced during motor testing. Following testing, RF electrodes were removed and 1 mL of a solution containing 2% PF Lidocaine was injected through each cannula following negative aspiration. The RF electrodes were then replaced and each targeted medial branch was sequentially subjected to thermal coagulation at 80 degrees Celsius for 90 seconds. Following the burn, RF electrodes were removed and 1 mL of a solution containing 2 mL 0.5% PF Bupivacaine and 1 mL Methylprednisolone (40 mg/mL) was injected through each cannula following negative aspiration.The RF electrodes were removed, stylets were replaced, and each cannua was removed intact.        Description of Findings: Not applicable     Prosthetic devices, grafts, tissues, or devices implanted: None     Specimen Removed: No     Estimated Blood Loss: minimal     COMPLICATIONS: None     DISPOSITION /  PLANS: The patient was transferred to the recovery area in a stable condition for observation. The patient was reexamined prior to discharge. There was no evidence of acute neurologic injury following the procedure.  Patient was discharged from the recovery room after meeting discharge criteria. Home discharge instructions were given to the patient by the staff.

## 2022-04-07 NOTE — DISCHARGE INSTRUCTIONS

## 2022-04-07 NOTE — DISCHARGE SUMMARY
[facility]  Discharge Note       Description of Procedure: Lumbar Radiofrequency Ablation under Fluoroscopic Guidance    Procedure Date: 4/7/2022    Admit Date: 4/7/2022  Discharge Date: 4/7/2022     Attending Physician: Lei Copeland   Discharge Provider: Lei Copeland    Preoperative Diagnosis: Lumbar Facet Arthropathy     Postoperative Diagnosis: as above, same as preoperative diagnosis    Discharged Condition: Stable    Hospital Course: Patient was admitted for an outpatient procedure. The procedure was tolerated well with no complications.    Final Diagnoses: Same as principal problem.    Disposition: Home, self-care.    Follow up/Patient Instructions:  Follow-up in clinic in 2-3 weeks.    Medications: No medications were prescribed today. The patient was advised to resume normal medication regimen without change.  Specific information was provided regarding restarting any anticoagulant/s.    Discharge Procedure Orders (must include Diet, Follow-up, Activity):  Light activity for the remainder of the day, resume normal activity tomorrow. Resume normal diet. Follow-up in clinic in 2-3 weeks.

## 2022-04-09 ENCOUNTER — PATIENT MESSAGE (OUTPATIENT)
Dept: PAIN MEDICINE | Facility: CLINIC | Age: 62
End: 2022-04-09
Payer: COMMERCIAL

## 2022-04-12 ENCOUNTER — OFFICE VISIT (OUTPATIENT)
Dept: INTERNAL MEDICINE | Facility: CLINIC | Age: 62
End: 2022-04-12
Payer: COMMERCIAL

## 2022-04-12 VITALS
TEMPERATURE: 98 F | HEART RATE: 76 BPM | BODY MASS INDEX: 28.18 KG/M2 | OXYGEN SATURATION: 97 % | WEIGHT: 169.31 LBS | SYSTOLIC BLOOD PRESSURE: 134 MMHG | DIASTOLIC BLOOD PRESSURE: 78 MMHG

## 2022-04-12 DIAGNOSIS — Z00.00 ROUTINE GENERAL MEDICAL EXAMINATION AT A HEALTH CARE FACILITY: Primary | ICD-10-CM

## 2022-04-12 DIAGNOSIS — Z12.11 COLON CANCER SCREENING: ICD-10-CM

## 2022-04-12 PROCEDURE — 99999 PR PBB SHADOW E&M-EST. PATIENT-LVL IV: CPT | Mod: PBBFAC,,, | Performed by: INTERNAL MEDICINE

## 2022-04-12 PROCEDURE — 3044F PR MOST RECENT HEMOGLOBIN A1C LEVEL <7.0%: ICD-10-PCS | Mod: CPTII,S$GLB,, | Performed by: INTERNAL MEDICINE

## 2022-04-12 PROCEDURE — 3078F PR MOST RECENT DIASTOLIC BLOOD PRESSURE < 80 MM HG: ICD-10-PCS | Mod: CPTII,S$GLB,, | Performed by: INTERNAL MEDICINE

## 2022-04-12 PROCEDURE — 3075F SYST BP GE 130 - 139MM HG: CPT | Mod: CPTII,S$GLB,, | Performed by: INTERNAL MEDICINE

## 2022-04-12 PROCEDURE — 3008F PR BODY MASS INDEX (BMI) DOCUMENTED: ICD-10-PCS | Mod: CPTII,S$GLB,, | Performed by: INTERNAL MEDICINE

## 2022-04-12 PROCEDURE — 3008F BODY MASS INDEX DOCD: CPT | Mod: CPTII,S$GLB,, | Performed by: INTERNAL MEDICINE

## 2022-04-12 PROCEDURE — 3044F HG A1C LEVEL LT 7.0%: CPT | Mod: CPTII,S$GLB,, | Performed by: INTERNAL MEDICINE

## 2022-04-12 PROCEDURE — 99396 PR PREVENTIVE VISIT,EST,40-64: ICD-10-PCS | Mod: S$GLB,,, | Performed by: INTERNAL MEDICINE

## 2022-04-12 PROCEDURE — 3075F PR MOST RECENT SYSTOLIC BLOOD PRESS GE 130-139MM HG: ICD-10-PCS | Mod: CPTII,S$GLB,, | Performed by: INTERNAL MEDICINE

## 2022-04-12 PROCEDURE — 99999 PR PBB SHADOW E&M-EST. PATIENT-LVL IV: ICD-10-PCS | Mod: PBBFAC,,, | Performed by: INTERNAL MEDICINE

## 2022-04-12 PROCEDURE — 99396 PREV VISIT EST AGE 40-64: CPT | Mod: S$GLB,,, | Performed by: INTERNAL MEDICINE

## 2022-04-12 PROCEDURE — 3078F DIAST BP <80 MM HG: CPT | Mod: CPTII,S$GLB,, | Performed by: INTERNAL MEDICINE

## 2022-04-12 NOTE — PROGRESS NOTES
Subjective:      Patient ID: María Julian is a 62 y.o. female.    Chief Complaint: Annual Exam    HPI     63 yo with   Patient Active Problem List   Diagnosis    CVID (common variable immunodeficiency)    Common variable immunodeficiency    Recurrent sinusitis    Bronchitis    Moderate persistent asthma without complication    Chronic obstructive pulmonary disease    Tendonitis    Hemochromatosis    Asthma-COPD overlap syndrome    Cough in adult patient    Ex-cigarette smoker     Past Medical History:   Diagnosis Date    Abnormal Pap smear of cervix     Allergy     Asthma     COPD (chronic obstructive pulmonary disease)     Malignant hyperthermia     pt's mother has hx of MH.  Pt denies any complications with general anesthesia    Recurrent upper respiratory infection (URI)      Here today for annual prev exam.  Compliant with meds without significant side effects. Energy and appetite are good.     Past Surgical History:   Procedure Laterality Date    BONE SPUR EXCISION SHOULDER Right     CARPAL TUNNEL RELEASE Left      SECTION      x 2    CHOLECYSTECTOMY  2018    DILATION AND CURETTAGE OF UTERUS      EXAMINATION UNDER ANESTHESIA N/A 2021    Procedure: Exam Under Anesthesia;  Surgeon: Don Allen MD;  Location: [facility] OR;  Service: Oncology;  Laterality: N/A;    FOOT TENDON SURGERY Bilateral     HYSTERECTOMY  1999    fibroid    INJECTION OF ANESTHETIC AGENT AROUND MEDIAL BRANCH NERVES INNERVATING LUMBAR FACET JOINT Right 10/28/2021    Procedure: Block-nerve-medial branch-lumbar Right L3, 4,5 MBB with RN IV sedation;  Surgeon: Lei Copeland MD;  Location: [facility] PAIN MGT;  Service: Pain Management;  Laterality: Right;    INJECTION OF ANESTHETIC AGENT AROUND MEDIAL BRANCH NERVES INNERVATING LUMBAR FACET JOINT Bilateral 2021    Procedure: Block-nerve-medial branch-lumbar bilateral L3, 4,5 MBB RN IV sedation;  Surgeon: Lei Copeland MD;  Location: [facility] PAIN MGT;   Service: Pain Management;  Laterality: Bilateral;    INJECTION OF ANESTHETIC AGENT AROUND MEDIAL BRANCH NERVES INNERVATING LUMBAR FACET JOINT Bilateral 3/14/2022    Procedure: Block-nerve-medial branch-lumbar L3, 4 5 NO STEROIDS RN IV sedation;  Surgeon: Lei Copeland MD;  Location: [facility] PAIN MGT;  Service: Pain Management;  Laterality: Bilateral;    INJECTION OF ANESTHETIC AGENT AROUND MEDIAL BRANCH NERVES INNERVATING LUMBAR FACET JOINT Bilateral 3/21/2022    Procedure: Block-nerve-medial branch-lumbar bilateral L3, 4,5 RN IV sedation NO STERIOD DIAGNOSTIC ONLY;  Surgeon: Lei Copeland MD;  Location: [facility] PAIN MGT;  Service: Pain Management;  Laterality: Bilateral;    LASER ABLATION N/A 6/16/2021    Procedure: ABLATION, USING LASER;  Surgeon: Don Allen MD;  Location: [facility] OR;  Service: Oncology;  Laterality: N/A;    MICRODISCECTOMY OF SPINE      OOPHORECTOMY Bilateral 1999    RADIOFREQUENCY THERMOCOAGULATION Bilateral 4/7/2022    Procedure: RADIOFREQUENCY THERMAL COAGULATION bilateral L3, 4,5 mbb RFA RN IV sedation;  Surgeon: Lei Copeland MD;  Location: [facility] PAIN [facility]GT;  Service: Pain Management;  Laterality: Bilateral;    TONSILLECTOMY      TRIGGER FINGER RELEASE Left     TUBAL LIGATION Bilateral 1982     Social History     Socioeconomic History    Marital status:    Tobacco Use    Smoking status: Former Smoker     Quit date: 9/6/2018     Years since quitting: 3.6    Smokeless tobacco: Never Used   Substance and Sexual Activity    Alcohol use: Yes     Comment: socially  No alcohol 72h prior to sx    Drug use: Never    Sexual activity: Not Currently     Social Determinants of Health     Financial Resource Strain: Low Risk     Difficulty of Paying Living Expenses: Not hard at all   Food Insecurity: No Food Insecurity    Worried About Running Out of Food in the Last Year: Never true    Ran Out of Food in the Last Year: Never true   Transportation Needs: No Transportation Needs    Lack of  Transportation (Medical): No    Lack of Transportation (Non-Medical): No   Physical Activity: Insufficiently Active    Days of Exercise per Week: 2 days    Minutes of Exercise per Session: 10 min   Stress: No Stress Concern Present    Feeling of Stress : Only a little   Social Connections: Unknown    Frequency of Communication with Friends and Family: More than three times a week    Frequency of Social Gatherings with Friends and Family: Once a week    Active Member of Clubs or Organizations: No    Attends Club or Organization Meetings: Never    Marital Status:    Housing Stability: Low Risk     Unable to Pay for Housing in the Last Year: No    Number of Places Lived in the Last Year: 1    Unstable Housing in the Last Year: No       Review of Systems   Constitutional: Negative for activity change and unexpected weight change.   HENT: Negative for hearing loss, rhinorrhea and trouble swallowing.    Eyes: Negative for discharge and visual disturbance.   Respiratory: Negative for chest tightness and wheezing.    Cardiovascular: Negative for chest pain and palpitations.   Gastrointestinal: Negative for blood in stool, constipation, diarrhea and vomiting.   Endocrine: Negative for polydipsia and polyuria.   Genitourinary: Negative for difficulty urinating, dysuria, hematuria and menstrual problem.   Musculoskeletal: Positive for arthralgias. Negative for neck pain.   Skin: Negative for rash and wound.   Neurological: Negative for weakness and headaches.   Psychiatric/Behavioral: Negative for confusion and dysphoric mood.     Objective:   /78   Pulse 76   Temp 98.3 °F (36.8 °C)   Wt 76.8 kg (169 lb 5 oz)   SpO2 97%   BMI 28.18 kg/m²     Physical Exam  Constitutional:       General: She is not in acute distress.     Appearance: She is well-developed.   HENT:      Head: Normocephalic and atraumatic.   Eyes:      Pupils: Pupils are equal, round, and reactive to light.   Neck:      Thyroid: No  thyromegaly.   Cardiovascular:      Rate and Rhythm: Normal rate and regular rhythm.   Pulmonary:      Breath sounds: Normal breath sounds. No wheezing or rales.   Abdominal:      General: Bowel sounds are normal.      Palpations: Abdomen is soft.      Tenderness: There is no abdominal tenderness.   Musculoskeletal:      Cervical back: Neck supple.   Lymphadenopathy:      Cervical: No cervical adenopathy.   Skin:     General: Skin is warm and dry.   Neurological:      Mental Status: She is alert and oriented to person, place, and time.   Psychiatric:         Behavior: Behavior normal.         Assessment:     1. Routine general medical examination at a health care facility    2. Colon cancer screening      Plan:   Routine general medical examination at a health care facility  Heart healthy diet and reg exercise  HM reviewed    -     Comprehensive Metabolic Panel; Future; Expected date: 04/12/2022  -     CBC Auto Differential; Future; Expected date: 04/12/2022  -     TSH; Future; Expected date: 04/12/2022  -     Lipid Panel; Future; Expected date: 04/12/2022  -     Hemoglobin A1C; Future; Expected date: 04/12/2022  -     Ambulatory referral/consult to Gynecology; Future; Expected date: 04/19/2022    Colon cancer screening  -     Cancel: Ambulatory referral/consult to Endo Procedure ; Future; Expected date: 04/13/2022        Lab Frequency Next Occurrence   Mammo Digital Diagnostic Left with Earl Once 04/21/2021   US Breast Left Limited Once 04/21/2021   Ambulatory referral/consult to Gynecologic Oncology Once 05/03/2021   Ambulatory referral/consult to Pulmonology Once 05/26/2021   IR Facet Inj Lumbar 1st Vert Uni Once 10/07/2021   IR Facet Inj Lumbar 2nd Vert Uni Once 10/07/2021   Ambulatory referral/consult to Cardiology Once 10/26/2021   Ambulatory referral/consult to Pulmonology Once 10/26/2021   IR RF Ablation Lumbar with Imaging Once 11/23/2021   IR RF Ablation Lumbar Ea Add with Imagin Once 11/23/2021    IR Facet Inj Lumbar 1st Vert Bi Once 12/02/2021   IR Facet Inj Lumbar 2nd Vert Bi Once 12/02/2021   IR RF Ablation Lumbar with Imaging Once 01/25/2022   IR RF Ablation Lumbar Ea Add with Imagin Once 01/25/2022   IR Facet Inj Lumbar 1st Vert Bi Once 03/01/2022   IR Facet Inj Lumbar 2nd Vert Bi Once 03/01/2022   IR Facet Inj Lumbar 1st Vert Bi Once 03/15/2022   IR Facet Inj Lumbar 2nd Vert Bi Once 03/15/2022       Problem List Items Addressed This Visit    None     Visit Diagnoses     Routine general medical examination at a health care facility    -  Primary    Relevant Orders    Comprehensive Metabolic Panel (Completed)    CBC Auto Differential (Completed)    TSH (Completed)    Lipid Panel (Completed)    Hemoglobin A1C (Completed)    Ambulatory referral/consult to Gynecology    Colon cancer screening              Follow up in about 1 year (around 4/12/2023), or if symptoms worsen or fail to improve.  Needs to complete mammogram that was ordered on 4/21.     Fasting labs 8 am.     Need colonoscopy report from [facility].

## 2022-04-14 ENCOUNTER — LAB VISIT (OUTPATIENT)
Dept: LAB | Facility: HOSPITAL | Age: 62
End: 2022-04-14
Attending: INTERNAL MEDICINE
Payer: COMMERCIAL

## 2022-04-14 DIAGNOSIS — Z00.00 ROUTINE GENERAL MEDICAL EXAMINATION AT A HEALTH CARE FACILITY: ICD-10-CM

## 2022-04-14 LAB
ALBUMIN SERPL BCP-MCNC: 3.9 G/DL (ref 3.5–5.2)
ALP SERPL-CCNC: 67 U/L (ref 55–135)
ALT SERPL W/O P-5'-P-CCNC: 9 U/L (ref 10–44)
ANION GAP SERPL CALC-SCNC: 11 MMOL/L (ref 8–16)
AST SERPL-CCNC: 14 U/L (ref 10–40)
BASOPHILS # BLD AUTO: 0.03 K/UL (ref 0–0.2)
BASOPHILS NFR BLD: 0.5 % (ref 0–1.9)
BILIRUB SERPL-MCNC: 0.6 MG/DL (ref 0.1–1)
BUN SERPL-MCNC: 21 MG/DL (ref 8–23)
CALCIUM SERPL-MCNC: 10 MG/DL (ref 8.7–10.5)
CHLORIDE SERPL-SCNC: 105 MMOL/L (ref 95–110)
CHOLEST SERPL-MCNC: 197 MG/DL (ref 120–199)
CHOLEST/HDLC SERPL: 3.1 {RATIO} (ref 2–5)
CO2 SERPL-SCNC: 24 MMOL/L (ref 23–29)
CREAT SERPL-MCNC: 0.9 MG/DL (ref 0.5–1.4)
DIFFERENTIAL METHOD: ABNORMAL
EOSINOPHIL # BLD AUTO: 0 K/UL (ref 0–0.5)
EOSINOPHIL NFR BLD: 0.5 % (ref 0–8)
ERYTHROCYTE [DISTWIDTH] IN BLOOD BY AUTOMATED COUNT: 12.6 % (ref 11.5–14.5)
EST. GFR  (AFRICAN AMERICAN): >60 ML/MIN/1.73 M^2
EST. GFR  (NON AFRICAN AMERICAN): >60 ML/MIN/1.73 M^2
ESTIMATED AVG GLUCOSE: 117 MG/DL (ref 68–131)
GLUCOSE SERPL-MCNC: 98 MG/DL (ref 70–110)
HBA1C MFR BLD: 5.7 % (ref 4–5.6)
HCT VFR BLD AUTO: 46.2 % (ref 37–48.5)
HDLC SERPL-MCNC: 64 MG/DL (ref 40–75)
HDLC SERPL: 32.5 % (ref 20–50)
HGB BLD-MCNC: 15.7 G/DL (ref 12–16)
IMM GRANULOCYTES # BLD AUTO: 0.02 K/UL (ref 0–0.04)
IMM GRANULOCYTES NFR BLD AUTO: 0.3 % (ref 0–0.5)
LDLC SERPL CALC-MCNC: 115.6 MG/DL (ref 63–159)
LYMPHOCYTES # BLD AUTO: 1.9 K/UL (ref 1–4.8)
LYMPHOCYTES NFR BLD: 33 % (ref 18–48)
MCH RBC QN AUTO: 36.4 PG (ref 27–31)
MCHC RBC AUTO-ENTMCNC: 34 G/DL (ref 32–36)
MCV RBC AUTO: 107 FL (ref 82–98)
MONOCYTES # BLD AUTO: 0.4 K/UL (ref 0.3–1)
MONOCYTES NFR BLD: 7 % (ref 4–15)
NEUTROPHILS # BLD AUTO: 3.4 K/UL (ref 1.8–7.7)
NEUTROPHILS NFR BLD: 58.7 % (ref 38–73)
NONHDLC SERPL-MCNC: 133 MG/DL
NRBC BLD-RTO: 0 /100 WBC
PLATELET # BLD AUTO: 246 K/UL (ref 150–450)
PMV BLD AUTO: 10.5 FL (ref 9.2–12.9)
POTASSIUM SERPL-SCNC: 3.9 MMOL/L (ref 3.5–5.1)
PROT SERPL-MCNC: 7.6 G/DL (ref 6–8.4)
RBC # BLD AUTO: 4.31 M/UL (ref 4–5.4)
SODIUM SERPL-SCNC: 140 MMOL/L (ref 136–145)
TRIGL SERPL-MCNC: 87 MG/DL (ref 30–150)
TSH SERPL DL<=0.005 MIU/L-ACNC: 1.79 UIU/ML (ref 0.4–4)
WBC # BLD AUTO: 5.72 K/UL (ref 3.9–12.7)

## 2022-04-14 PROCEDURE — 83036 HEMOGLOBIN GLYCOSYLATED A1C: CPT | Performed by: INTERNAL MEDICINE

## 2022-04-14 PROCEDURE — 84443 ASSAY THYROID STIM HORMONE: CPT | Performed by: INTERNAL MEDICINE

## 2022-04-14 PROCEDURE — 85025 COMPLETE CBC W/AUTO DIFF WBC: CPT | Performed by: INTERNAL MEDICINE

## 2022-04-14 PROCEDURE — 80061 LIPID PANEL: CPT | Performed by: INTERNAL MEDICINE

## 2022-04-14 PROCEDURE — 36415 COLL VENOUS BLD VENIPUNCTURE: CPT | Performed by: INTERNAL MEDICINE

## 2022-04-14 PROCEDURE — 80053 COMPREHEN METABOLIC PANEL: CPT | Performed by: INTERNAL MEDICINE

## 2022-04-18 ENCOUNTER — PATIENT OUTREACH (OUTPATIENT)
Dept: ADMINISTRATIVE | Facility: OTHER | Age: 62
End: 2022-04-18
Payer: COMMERCIAL

## 2022-04-20 ENCOUNTER — HOSPITAL ENCOUNTER (OUTPATIENT)
Dept: RADIOLOGY | Facility: HOSPITAL | Age: 62
Discharge: HOME | End: 2022-04-20
Attending: INTERNAL MEDICINE
Payer: COMMERCIAL

## 2022-04-20 ENCOUNTER — OFFICE VISIT (OUTPATIENT)
Dept: OBSTETRICS AND GYNECOLOGY | Facility: CLINIC | Age: 62
End: 2022-04-20
Payer: COMMERCIAL

## 2022-04-20 VITALS
DIASTOLIC BLOOD PRESSURE: 88 MMHG | BODY MASS INDEX: 27.51 KG/M2 | SYSTOLIC BLOOD PRESSURE: 142 MMHG | WEIGHT: 165.13 LBS | HEIGHT: 65 IN

## 2022-04-20 VITALS — WEIGHT: 169 LBS | HEIGHT: 65 IN | BODY MASS INDEX: 28.16 KG/M2

## 2022-04-20 DIAGNOSIS — R92.8 ABNORMAL MAMMOGRAM: ICD-10-CM

## 2022-04-20 DIAGNOSIS — Z01.419 ENCOUNTER FOR GYNECOLOGICAL EXAMINATION WITHOUT ABNORMAL FINDING: Primary | ICD-10-CM

## 2022-04-20 DIAGNOSIS — D07.1 VIN III (VULVAR INTRAEPITHELIAL NEOPLASIA III): ICD-10-CM

## 2022-04-20 PROCEDURE — 3008F PR BODY MASS INDEX (BMI) DOCUMENTED: ICD-10-PCS | Mod: CPTII,S$GLB,, | Performed by: NURSE PRACTITIONER

## 2022-04-20 PROCEDURE — 77066 DX MAMMO INCL CAD BI: CPT | Mod: 26,,, | Performed by: RADIOLOGY

## 2022-04-20 PROCEDURE — 77062 BREAST TOMOSYNTHESIS BI: CPT | Mod: 26,,, | Performed by: RADIOLOGY

## 2022-04-20 PROCEDURE — 3079F DIAST BP 80-89 MM HG: CPT | Mod: CPTII,S$GLB,, | Performed by: NURSE PRACTITIONER

## 2022-04-20 PROCEDURE — 99999 PR PBB SHADOW E&M-EST. PATIENT-LVL III: CPT | Mod: PBBFAC,,, | Performed by: NURSE PRACTITIONER

## 2022-04-20 PROCEDURE — 1159F MED LIST DOCD IN RCRD: CPT | Mod: CPTII,S$GLB,, | Performed by: NURSE PRACTITIONER

## 2022-04-20 PROCEDURE — 3079F PR MOST RECENT DIASTOLIC BLOOD PRESSURE 80-89 MM HG: ICD-10-PCS | Mod: CPTII,S$GLB,, | Performed by: NURSE PRACTITIONER

## 2022-04-20 PROCEDURE — 99999 PR PBB SHADOW E&M-EST. PATIENT-LVL III: ICD-10-PCS | Mod: PBBFAC,,, | Performed by: NURSE PRACTITIONER

## 2022-04-20 PROCEDURE — 77062 MAMMO DIGITAL DIAGNOSTIC BILAT WITH TOMO: ICD-10-PCS | Mod: 26,,, | Performed by: RADIOLOGY

## 2022-04-20 PROCEDURE — 99396 PR PREVENTIVE VISIT,EST,40-64: ICD-10-PCS | Mod: S$GLB,,, | Performed by: NURSE PRACTITIONER

## 2022-04-20 PROCEDURE — 76642 ULTRASOUND BREAST LIMITED: CPT | Mod: TC,LT

## 2022-04-20 PROCEDURE — 76642 US BREAST LEFT LIMITED: ICD-10-PCS | Mod: 26,LT,, | Performed by: RADIOLOGY

## 2022-04-20 PROCEDURE — 3008F BODY MASS INDEX DOCD: CPT | Mod: CPTII,S$GLB,, | Performed by: NURSE PRACTITIONER

## 2022-04-20 PROCEDURE — 76642 ULTRASOUND BREAST LIMITED: CPT | Mod: 26,LT,, | Performed by: RADIOLOGY

## 2022-04-20 PROCEDURE — 1160F PR REVIEW ALL MEDS BY PRESCRIBER/CLIN PHARMACIST DOCUMENTED: ICD-10-PCS | Mod: CPTII,S$GLB,, | Performed by: NURSE PRACTITIONER

## 2022-04-20 PROCEDURE — 1160F RVW MEDS BY RX/DR IN RCRD: CPT | Mod: CPTII,S$GLB,, | Performed by: NURSE PRACTITIONER

## 2022-04-20 PROCEDURE — 3044F HG A1C LEVEL LT 7.0%: CPT | Mod: CPTII,S$GLB,, | Performed by: NURSE PRACTITIONER

## 2022-04-20 PROCEDURE — 3077F SYST BP >= 140 MM HG: CPT | Mod: CPTII,S$GLB,, | Performed by: NURSE PRACTITIONER

## 2022-04-20 PROCEDURE — 99396 PREV VISIT EST AGE 40-64: CPT | Mod: S$GLB,,, | Performed by: NURSE PRACTITIONER

## 2022-04-20 PROCEDURE — 77062 BREAST TOMOSYNTHESIS BI: CPT | Mod: TC

## 2022-04-20 PROCEDURE — 1159F PR MEDICATION LIST DOCUMENTED IN MEDICAL RECORD: ICD-10-PCS | Mod: CPTII,S$GLB,, | Performed by: NURSE PRACTITIONER

## 2022-04-20 PROCEDURE — 3044F PR MOST RECENT HEMOGLOBIN A1C LEVEL <7.0%: ICD-10-PCS | Mod: CPTII,S$GLB,, | Performed by: NURSE PRACTITIONER

## 2022-04-20 PROCEDURE — 77066 MAMMO DIGITAL DIAGNOSTIC BILAT WITH TOMO: ICD-10-PCS | Mod: 26,,, | Performed by: RADIOLOGY

## 2022-04-20 PROCEDURE — 3077F PR MOST RECENT SYSTOLIC BLOOD PRESSURE >= 140 MM HG: ICD-10-PCS | Mod: CPTII,S$GLB,, | Performed by: NURSE PRACTITIONER

## 2022-04-20 NOTE — PROGRESS NOTES
CC: Well woman exam    María Julian is a 62 y.o. female  presents for a well woman exam.  No issues, problems, or complaints.    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 Allen and a laser ablation and EUA was done on 21  Pt had not had any issues since and this is her first visit back with me     Past Medical History:   Diagnosis Date    Abnormal Pap smear of cervix     Allergy     Asthma     COPD (chronic obstructive pulmonary disease)     Malignant hyperthermia     pt's mother has hx of MH.  Pt denies any complications with general anesthesia    Recurrent upper respiratory infection (URI)      Past Surgical History:   Procedure Laterality Date    BONE SPUR EXCISION SHOULDER Right     CARPAL TUNNEL RELEASE Left      SECTION      x 2    CHOLECYSTECTOMY  2018    DILATION AND CURETTAGE OF UTERUS      EXAMINATION UNDER ANESTHESIA N/A 2021    Procedure: Exam Under Anesthesia;  Surgeon: Don Allen MD;  Location: [facility] OR;  Service: Oncology;  Laterality: N/A;    FOOT TENDON SURGERY Bilateral     HYSTERECTOMY  1999    fibroid    INJECTION OF ANESTHETIC AGENT AROUND MEDIAL BRANCH NERVES INNERVATING LUMBAR FACET JOINT Right 10/28/2021    Procedure: Block-nerve-medial branch-lumbar Right L3, 4,5 MBB with RN IV sedation;  Surgeon: Lei Copeland MD;  Location: [facility]GT;  Service: Pain Management;  Laterality: Right;    INJECTION OF ANESTHETIC AGENT AROUND MEDIAL BRANCH NERVES INNERVATING LUMBAR FACET JOINT Bilateral 2021    Procedure: Block-nerve-medial branch-lumbar bilateral L3, 4,5 MBB RN IV sedation;  Surgeon: Lei Copeland MD;  Location: [facility];  Service: Pain Management;  Laterality: Bilateral;    INJECTION OF ANESTHETIC AGENT AROUND MEDIAL BRANCH NERVES INNERVATING LUMBAR FACET JOINT Bilateral  "3/14/2022    Procedure: Block-nerve-medial branch-lumbar L3, 4 5 NO STEROIDS RN IV sedation;  Surgeon: Lei Copeland MD;  Location: [facility]IN [facility]GT;  Service: Pain Management;  Laterality: Bilateral;    INJECTION OF ANESTHETIC AGENT AROUND MEDIAL BRANCH NERVES INNERVATING LUMBAR FACET JOINT Bilateral 3/21/2022    Procedure: Block-nerve-medial branch-lumbar bilateral L3, 4,5 RN IV sedation NO STERIOD DIAGNOSTIC ONLY;  Surgeon: Lei Copeland MD;  Location: [facility]IN [facility]GT;  Service: Pain Management;  Laterality: Bilateral;    LASER ABLATION N/A 2021    Procedure: ABLATION, USING LASER;  Surgeon: Don Allen MD;  Location: [facility] OR;  Service: Oncology;  Laterality: N/A;    MICRODISCECTOMY OF SPINE      OOPHORECTOMY Bilateral     RADIOFREQUENCY THERMOCOAGULATION Bilateral 2022    Procedure: RADIOFREQUENCY THERMAL COAGULATION bilateral L3, 4,5 mbb RFA RN IV sedation;  Surgeon: Lei Copeland MD;  Location: [facility]IN [facility]GT;  Service: Pain Management;  Laterality: Bilateral;    TONSILLECTOMY      TRIGGER FINGER RELEASE Left     TUBAL LIGATION Bilateral      Family History   Problem Relation Age of Onset    Malignant hypertension Mother      Social History     Tobacco Use    Smoking status: Former Smoker     Quit date: 2018     Years since quitting: 3.6    Smokeless tobacco: Never Used   Substance Use Topics    Alcohol use: Yes     Comment: socially  No alcohol 72h prior to sx    Drug use: Never     OB History        3    Para   2    Term   2            AB   1    Living   2       SAB        IAB        Ectopic        Multiple        Live Births                     BP (!) 142/88   Ht 5' 5" (1.651 m)   Wt 74.9 kg (165 lb 2 oz)   BMI 27.48 kg/m²     ROS:  GENERAL: Denies weight gain or weight loss. Feeling well overall.   SKIN: Denies rash or lesions.   HEAD: Denies head injury or headache.   NODES: Denies enlarged lymph nodes.   CHEST: Denies chest pain or shortness of breath. "   CARDIOVASCULAR: Denies palpitations or left sided chest pain.   ABDOMEN: No abdominal pain, constipation, diarrhea, nausea, vomiting or rectal bleeding.   URINARY: No frequency, dysuria, hematuria, or burning on urination.  REPRODUCTIVE: See HPI.   BREASTS: The patient performs breast self-examination and denies pain, lumps, or nipple discharge.   HEMATOLOGIC: No easy bruisability or excessive bleeding.   MUSCULOSKELETAL: Denies joint pain or swelling.   NEUROLOGIC: Denies syncope or weakness.   PSYCHIATRIC: Denies depression, anxiety or mood swings.    PE:   APPEARANCE: Well nourished, well developed, in no acute distress.  AFFECT: WNL, alert and oriented x 3.  SKIN: No acne or hirsutism.  NECK: Neck symmetric without masses or thyromegaly.  NODES: No inguinal, cervical, axillary or femoral lymph node enlargement.  CHEST: Good respiratory effort.   ABDOMEN: Soft. No tenderness or masses. No hepatosplenomegaly. No hernias.  BREASTS: Symmetrical, no skin changes or visible lesions. No palpable masses, nipple discharge bilaterally.  PELVIC: Normal external female genitalia without lesions. Normal hair distribution. Adequate perineal body, normal urethral meatus. Vagina atrophic without lesions or discharge. No significant cystocele or rectocele. Bimanual exam shows uterus and cervix to be surgically absent. Adnexa absent   Has #1 small flat light pigmented lesion to right mid labia, #2 flat 1 cm pigmented lesion also to right perineal area, #3 flat small pigmented lesion to left side more buttock region    1. Encounter for gynecological examination without abnormal finding     2. CELENA III (vulvar intraepithelial neoplasia III)      and PLAN:    Encouraged pt would like to see her back in 6 months for recheck of lesions - around end of October

## 2022-05-04 ENCOUNTER — OFFICE VISIT (OUTPATIENT)
Dept: ALLERGY | Facility: CLINIC | Age: 62
End: 2022-05-04
Payer: COMMERCIAL

## 2022-05-04 VITALS
HEART RATE: 75 BPM | SYSTOLIC BLOOD PRESSURE: 160 MMHG | DIASTOLIC BLOOD PRESSURE: 85 MMHG | TEMPERATURE: 98 F | BODY MASS INDEX: 27.56 KG/M2 | HEIGHT: 65 IN | WEIGHT: 165.38 LBS

## 2022-05-04 DIAGNOSIS — J44.89 ASTHMA-COPD OVERLAP SYNDROME: ICD-10-CM

## 2022-05-04 DIAGNOSIS — Z87.891 EX-CIGARETTE SMOKER: ICD-10-CM

## 2022-05-04 DIAGNOSIS — D83.9 CVID (COMMON VARIABLE IMMUNODEFICIENCY): Primary | ICD-10-CM

## 2022-05-04 DIAGNOSIS — R05.9 COUGH IN ADULT: ICD-10-CM

## 2022-05-04 DIAGNOSIS — J32.9 RECURRENT SINUSITIS: ICD-10-CM

## 2022-05-04 DIAGNOSIS — J40 BRONCHITIS: ICD-10-CM

## 2022-05-04 DIAGNOSIS — E83.119 HEMOCHROMATOSIS, UNSPECIFIED HEMOCHROMATOSIS TYPE: ICD-10-CM

## 2022-05-04 DIAGNOSIS — R53.81 MALAISE: ICD-10-CM

## 2022-05-04 PROCEDURE — 99214 PR OFFICE/OUTPT VISIT, EST, LEVL IV, 30-39 MIN: ICD-10-PCS | Mod: S$GLB,,, | Performed by: SPECIALIST

## 2022-05-04 PROCEDURE — 99214 OFFICE O/P EST MOD 30 MIN: CPT | Mod: S$GLB,,, | Performed by: SPECIALIST

## 2022-05-04 PROCEDURE — 3044F HG A1C LEVEL LT 7.0%: CPT | Mod: CPTII,S$GLB,, | Performed by: SPECIALIST

## 2022-05-04 PROCEDURE — 3008F PR BODY MASS INDEX (BMI) DOCUMENTED: ICD-10-PCS | Mod: CPTII,S$GLB,, | Performed by: SPECIALIST

## 2022-05-04 PROCEDURE — 3079F DIAST BP 80-89 MM HG: CPT | Mod: CPTII,S$GLB,, | Performed by: SPECIALIST

## 2022-05-04 PROCEDURE — 3044F PR MOST RECENT HEMOGLOBIN A1C LEVEL <7.0%: ICD-10-PCS | Mod: CPTII,S$GLB,, | Performed by: SPECIALIST

## 2022-05-04 PROCEDURE — 3079F PR MOST RECENT DIASTOLIC BLOOD PRESSURE 80-89 MM HG: ICD-10-PCS | Mod: CPTII,S$GLB,, | Performed by: SPECIALIST

## 2022-05-04 PROCEDURE — 1160F PR REVIEW ALL MEDS BY PRESCRIBER/CLIN PHARMACIST DOCUMENTED: ICD-10-PCS | Mod: CPTII,S$GLB,, | Performed by: SPECIALIST

## 2022-05-04 PROCEDURE — 1159F PR MEDICATION LIST DOCUMENTED IN MEDICAL RECORD: ICD-10-PCS | Mod: CPTII,S$GLB,, | Performed by: SPECIALIST

## 2022-05-04 PROCEDURE — 99999 PR PBB SHADOW E&M-EST. PATIENT-LVL III: ICD-10-PCS | Mod: PBBFAC,,, | Performed by: SPECIALIST

## 2022-05-04 PROCEDURE — 1160F RVW MEDS BY RX/DR IN RCRD: CPT | Mod: CPTII,S$GLB,, | Performed by: SPECIALIST

## 2022-05-04 PROCEDURE — 3077F SYST BP >= 140 MM HG: CPT | Mod: CPTII,S$GLB,, | Performed by: SPECIALIST

## 2022-05-04 PROCEDURE — 99999 PR PBB SHADOW E&M-EST. PATIENT-LVL III: CPT | Mod: PBBFAC,,, | Performed by: SPECIALIST

## 2022-05-04 PROCEDURE — 3008F BODY MASS INDEX DOCD: CPT | Mod: CPTII,S$GLB,, | Performed by: SPECIALIST

## 2022-05-04 PROCEDURE — 1159F MED LIST DOCD IN RCRD: CPT | Mod: CPTII,S$GLB,, | Performed by: SPECIALIST

## 2022-05-04 PROCEDURE — 3077F PR MOST RECENT SYSTOLIC BLOOD PRESSURE >= 140 MM HG: ICD-10-PCS | Mod: CPTII,S$GLB,, | Performed by: SPECIALIST

## 2022-05-04 NOTE — PROGRESS NOTES
Subjective:       Patient ID: María Julian is a 62 y.o. female.    Chief Complaint:     CVID-- RECURRENT INFECTIONS- HEMOCHROMATOSIS, ASTHMA- COPD  HPI:     female 62 year old with the aove complaints.  She had been chronically il;l with recurrent infections. About two decades ago - immune work up revealed CVID-- A PIDD.  When antibiotic prophylaxis failed she was started on antibody replacement with monthly IVIG-- She has done great on this therapy. Maintains excellent trough.    Breakthrough infections are promptly treated.  No significant allergies.  Chronic cigarette smoker- quit smoking-- On Trelegy for asthma - COPD overlap. EIB prophylaxed with Proair PRE RX before exercise.  Chronically fatigued. Works for [employer]. No ongoing irritants exposure.  Has macrolide allergy- Augmentin causes severe vaguinal yeast.  Levaquin caused tendonitis.  Had received COVID vaccine- 2 DOSES-- will get third dose 8 months after the second dose       Erythromycin, Macrolide antibiotics, Iodine and iodide containing products, and Levofloxacin     Past Medical History:   Diagnosis Date    Abnormal Pap smear of cervix     Allergy     Asthma     COPD (chronic obstructive pulmonary disease)     Malignant hyperthermia     pt's mother has hx of MH.  Pt denies any complications with general anesthesia    Recurrent upper respiratory infection (URI)        Family History   Problem Relation Age of Onset    Malignant hypertension Mother        Environmental History: Dust Mite Controls: Dust mite controls are already in place.     Review of Systems   Constitutional: Positive for fatigue. Negative for fever.   HENT: Positive for congestion and postnasal drip. Negative for ear pain, rhinorrhea, sinus pressure, sinus pain, sneezing and sore throat.    Eyes: Negative.  Negative for redness and itching.   Respiratory: Positive for cough. Negative for chest tightness, shortness of breath and wheezing.     Cardiovascular: Negative.  Negative for chest pain.   Gastrointestinal: Negative.  Negative for nausea.   Endocrine: Negative.  Negative for cold intolerance.   Genitourinary: Negative.  Negative for frequency.   Musculoskeletal: Negative.  Negative for myalgias.   Skin: Negative.  Negative for rash.   Allergic/Immunologic: Positive for environmental allergies. Negative for food allergies and immunocompromised state.   Neurological: Negative.  Negative for dizziness and headaches.   Hematological: Negative.  Negative for adenopathy.   Psychiatric/Behavioral: Negative.  Negative for sleep disturbance.       Objective:     There were no vitals taken for this visit.    Physical Exam  Vitals and nursing note reviewed.   Constitutional:       Appearance: Normal appearance. She is normal weight.   HENT:      Head: Normocephalic and atraumatic.      Right Ear: Tympanic membrane, ear canal and external ear normal.      Left Ear: Tympanic membrane, ear canal and external ear normal.      Nose: Congestion and rhinorrhea present.      Mouth/Throat:      Mouth: Mucous membranes are dry.      Pharynx: Oropharynx is clear.   Eyes:      Extraocular Movements: Extraocular movements intact.      Conjunctiva/sclera: Conjunctivae normal.      Pupils: Pupils are equal, round, and reactive to light.   Cardiovascular:      Rate and Rhythm: Normal rate and regular rhythm.      Pulses: Normal pulses.      Heart sounds: Normal heart sounds.   Pulmonary:      Effort: Pulmonary effort is normal.      Breath sounds: Normal breath sounds.   Abdominal:      General: Abdomen is flat. Bowel sounds are normal.      Palpations: Abdomen is soft.   Musculoskeletal:         General: Normal range of motion.      Cervical back: Normal range of motion and neck supple.   Skin:     General: Skin is warm and dry.      Capillary Refill: Capillary refill takes less than 2 seconds.   Neurological:      General: No focal deficit present.      Mental Status:  She is alert and oriented to person, place, and time. Mental status is at baseline.   Psychiatric:         Mood and Affect: Mood normal.         Behavior: Behavior normal.         Thought Content: Thought content normal.         Judgment: Judgment normal.           Assessment:      1. CVID (common variable immunodeficiency)    2. Recurrent sinusitis    3. Bronchitis    4. Hemochromatosis, unspecified hemochromatosis type    5. Asthma-COPD overlap syndrome    6. Ex-cigarette smoker    7. Cough in adult    8. Malaise    9       Levaquin---caused  tendonitis  and Macrolide allergy    Plan:       Monthly IVIG infusions -- Privigen 10 % 30 grams-- 300 ml to be infused over 4 hours  Has been maintaining trouigh IgG--  AROUND 1000 mg/ dl.  Monitor for short and long term sequelae of CVID  TREAT ALL INFECTIONS-- Responds well to 10 days of Azithromycin and 3- 5 days Prednisone  tRELEGY 200/ 62.5/ 25--- ONE PUFF DAILY.  PROAIR HFA- 90 MCG 2 PUFFS QID PRN  HAD COVID vaccine - 2 doses.-- WILL GET THE THIRD DOSE 8 MONTHS AFTER THE SECOND DOSE  Follow up in 3 months  Spirogram not done- COVID protocol                Problems Address                                                 Amount and/or Complexity                                                                      Risk       3           [] 2 or more self-limited or minor problems                      [] Limited                                                                        [] Low                  [] 1 stable chronic illness                                                  Any combination of the two                                               OTC drugs                  []Acute, uncomplicated illness or injury                            Review of prior external notes from unique source           Minor surgery with no risk factors                                                                                                               [] 1 []2  []3+                                                                                                               Review of results from each unique test                                                                                                               [] 1 []2  [] 3+                                                                                                              Order of each unique test                                                                                                               [] 1 []2  [] 3+                                                                                                              Or                                                                                                             [] Assessment requiring an independent historian      4            [x] One or more chronic illness with exacerbation,              [x] Moderate                                                                      [x] Moderate                 Progression, or side effects of treatment                            -test documents or independent historians                        Prescription drug management                [x]  2 or more sable chronic illnesses                                    [] Independent interpretation of tests                              Minor surgery with identifiable risk                [] 1 undiagnosed new problem with uncertain prognosis    [] Discussion or management of test results                    elective major surgery                [] 1 acute illness with                systemic symptoms                                                                                                                                                              [] 1 acute complicated injury                                                                                                                                          Elective major surgery                                                                                                                                                                                                                                                                                                                                                                                                   5            [] 1 or more chronic illnesses with severe exacerbation,     [] Extensive(two from below)                                         [] High                                                                                                               [] Independent interpretation of results                         Drug therapy requiring intensive                                                                                                               []Discussion of management or test interpretation           monitoring                                                                                                                                                                                                       Decision to de-escalate care                 [] 1 acute or chronic illness or injury that poses a threat                                                                                               Decision regarding hospitalization

## 2022-06-02 ENCOUNTER — OFFICE VISIT (OUTPATIENT)
Dept: CARDIOLOGY | Facility: CLINIC | Age: 62
End: 2022-06-02
Payer: COMMERCIAL

## 2022-06-02 VITALS
HEIGHT: 65 IN | OXYGEN SATURATION: 98 % | BODY MASS INDEX: 27.92 KG/M2 | HEART RATE: 81 BPM | SYSTOLIC BLOOD PRESSURE: 130 MMHG | WEIGHT: 167.56 LBS | DIASTOLIC BLOOD PRESSURE: 82 MMHG

## 2022-06-02 DIAGNOSIS — D83.9 CVID (COMMON VARIABLE IMMUNODEFICIENCY): ICD-10-CM

## 2022-06-02 DIAGNOSIS — E83.119 HEMOCHROMATOSIS, UNSPECIFIED HEMOCHROMATOSIS TYPE: ICD-10-CM

## 2022-06-02 DIAGNOSIS — E78.5 HYPERLIPIDEMIA, UNSPECIFIED HYPERLIPIDEMIA TYPE: ICD-10-CM

## 2022-06-02 DIAGNOSIS — J44.9 CHRONIC OBSTRUCTIVE PULMONARY DISEASE, UNSPECIFIED COPD TYPE: ICD-10-CM

## 2022-06-02 DIAGNOSIS — I10 ESSENTIAL HYPERTENSION: Primary | ICD-10-CM

## 2022-06-02 PROCEDURE — 3008F PR BODY MASS INDEX (BMI) DOCUMENTED: ICD-10-PCS | Mod: CPTII,S$GLB,, | Performed by: INTERNAL MEDICINE

## 2022-06-02 PROCEDURE — 3079F PR MOST RECENT DIASTOLIC BLOOD PRESSURE 80-89 MM HG: ICD-10-PCS | Mod: CPTII,S$GLB,, | Performed by: INTERNAL MEDICINE

## 2022-06-02 PROCEDURE — 99999 PR PBB SHADOW E&M-EST. PATIENT-LVL III: ICD-10-PCS | Mod: PBBFAC,,, | Performed by: INTERNAL MEDICINE

## 2022-06-02 PROCEDURE — 3044F HG A1C LEVEL LT 7.0%: CPT | Mod: CPTII,S$GLB,, | Performed by: INTERNAL MEDICINE

## 2022-06-02 PROCEDURE — 3075F SYST BP GE 130 - 139MM HG: CPT | Mod: CPTII,S$GLB,, | Performed by: INTERNAL MEDICINE

## 2022-06-02 PROCEDURE — 1159F MED LIST DOCD IN RCRD: CPT | Mod: CPTII,S$GLB,, | Performed by: INTERNAL MEDICINE

## 2022-06-02 PROCEDURE — 3044F PR MOST RECENT HEMOGLOBIN A1C LEVEL <7.0%: ICD-10-PCS | Mod: CPTII,S$GLB,, | Performed by: INTERNAL MEDICINE

## 2022-06-02 PROCEDURE — 3008F BODY MASS INDEX DOCD: CPT | Mod: CPTII,S$GLB,, | Performed by: INTERNAL MEDICINE

## 2022-06-02 PROCEDURE — 3079F DIAST BP 80-89 MM HG: CPT | Mod: CPTII,S$GLB,, | Performed by: INTERNAL MEDICINE

## 2022-06-02 PROCEDURE — 3075F PR MOST RECENT SYSTOLIC BLOOD PRESS GE 130-139MM HG: ICD-10-PCS | Mod: CPTII,S$GLB,, | Performed by: INTERNAL MEDICINE

## 2022-06-02 PROCEDURE — 99999 PR PBB SHADOW E&M-EST. PATIENT-LVL III: CPT | Mod: PBBFAC,,, | Performed by: INTERNAL MEDICINE

## 2022-06-02 PROCEDURE — 1159F PR MEDICATION LIST DOCUMENTED IN MEDICAL RECORD: ICD-10-PCS | Mod: CPTII,S$GLB,, | Performed by: INTERNAL MEDICINE

## 2022-06-02 PROCEDURE — 99214 PR OFFICE/OUTPT VISIT, EST, LEVL IV, 30-39 MIN: ICD-10-PCS | Mod: S$GLB,,, | Performed by: INTERNAL MEDICINE

## 2022-06-02 PROCEDURE — 99214 OFFICE O/P EST MOD 30 MIN: CPT | Mod: S$GLB,,, | Performed by: INTERNAL MEDICINE

## 2022-06-02 RX ORDER — METOPROLOL SUCCINATE 25 MG/1
25 TABLET, EXTENDED RELEASE ORAL DAILY
Qty: 90 TABLET | Refills: 3 | Status: SHIPPED | OUTPATIENT
Start: 2022-06-02 | End: 2022-10-04

## 2022-06-02 NOTE — PROGRESS NOTES
Subjective:   Patient ID:  María Julian is a 62 y.o. female who presents for evaluation of No chief complaint on file.      HPI     .  Comes in for follow up , overall she is doing better since last visit, since she states her back pain feels better after RFA  She states she has been having higher BP at home and on visit   Her echo didn't show low EF or cardiomyopathy, done since hx of hemochromatosis   Started on hctz last visit with better numbers overall except for some higher   No chest pain, no dyspnea, no palpitations  Also under some work stress         62 yo female, ex smoker quit in 2018, 45 pqy, elevated BP without hx of HTN, BP ranges high normal to abnormal   She works in an office. She uses the stairs every day, 1 flight multiple times a day, without chest pain, dyspnea, syncope, presyncope, dizziness, headaches.   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  Pt denies any complications with general anesthesia    Recurrent upper respiratory infection (URI)        Past Surgical History:   Procedure Laterality Date    BONE SPUR EXCISION SHOULDER Right     CARPAL TUNNEL RELEASE Left      SECTION      x 2    CHOLECYSTECTOMY  2018    DILATION AND CURETTAGE OF UTERUS      EXAMINATION UNDER ANESTHESIA N/A 2021    Procedure: Exam Under Anesthesia;  Surgeon: Don Allen MD;  Location: [facility] OR;  Service: Oncology;  Laterality: N/A;    FOOT TENDON SURGERY Bilateral     HYSTERECTOMY      fibroid    INJECTION OF  ANESTHETIC AGENT AROUND MEDIAL BRANCH NERVES INNERVATING LUMBAR FACET JOINT Right 10/28/2021    Procedure: Block-nerve-medial branch-lumbar Right L3, 4,5 MBB with RN IV sedation;  Surgeon: Lei Copeland MD;  Location: [facility];  Service: Pain Management;  Laterality: Right;    INJECTION OF ANESTHETIC AGENT AROUND MEDIAL BRANCH NERVES INNERVATING LUMBAR FACET JOINT Bilateral 12/30/2021    Procedure: Block-nerve-medial branch-lumbar bilateral L3, 4,5 MBB RN IV sedation;  Surgeon: Lei Copeland MD;  Location: [facility];  Service: Pain Management;  Laterality: Bilateral;    INJECTION OF ANESTHETIC AGENT AROUND MEDIAL BRANCH NERVES INNERVATING LUMBAR FACET JOINT Bilateral 3/14/2022    Procedure: Block-nerve-medial branch-lumbar L3, 4 5 NO STEROIDS RN IV sedation;  Surgeon: Lei Copeland MD;  Location: [facility];  Service: Pain Management;  Laterality: Bilateral;    INJECTION OF ANESTHETIC AGENT AROUND MEDIAL BRANCH NERVES INNERVATING LUMBAR FACET JOINT Bilateral 3/21/2022    Procedure: Block-nerve-medial branch-lumbar bilateral L3, 4,5 RN IV sedation NO STERIOD DIAGNOSTIC ONLY;  Surgeon: Lei Copeland MD;  Location: [facility];  Service: Pain Management;  Laterality: Bilateral;    LASER ABLATION N/A 6/16/2021    Procedure: ABLATION, USING LASER;  Surgeon: Don Allen MD;  Location: [facility] OR;  Service: Oncology;  Laterality: N/A;    MICRODISCECTOMY OF SPINE      OOPHORECTOMY Bilateral 1999    RADIOFREQUENCY THERMOCOAGULATION Bilateral 4/7/2022    Procedure: RADIOFREQUENCY THERMAL COAGULATION bilateral L3, 4,5 mbb RFA RN IV sedation;  Surgeon: Lei Copeland MD;  Location: [facility];  Service: Pain Management;  Laterality: Bilateral;    TONSILLECTOMY      TRIGGER FINGER RELEASE Left     TUBAL LIGATION Bilateral 1982       Social History     Tobacco Use    Smoking status: Former Smoker     Quit date: 9/6/2018     Years since quitting: 3.7    Smokeless tobacco: Never Used   Substance Use Topics     Alcohol use: Yes     Comment: socially  No alcohol 72h prior to sx    Drug use: Never       Family History   Problem Relation Age of Onset    Malignant hypertension Mother        Review of Systems   Constitutional: Negative for fever and malaise/fatigue.   HENT: Negative for sore throat.    Eyes: Negative for blurred vision.   Cardiovascular: Negative for chest pain, claudication, cyanosis, dyspnea on exertion, irregular heartbeat, leg swelling, near-syncope, orthopnea, palpitations, paroxysmal nocturnal dyspnea and syncope.   Respiratory: Negative for cough and hemoptysis.    Hematologic/Lymphatic: Negative for bleeding problem.   Skin: Negative for poor wound healing and rash.   Musculoskeletal: Negative for back pain and falls.   Gastrointestinal: Negative for abdominal pain.   Genitourinary: Negative for nocturia.   Neurological: Negative for dizziness, focal weakness, headaches, light-headedness and loss of balance.   Psychiatric/Behavioral: Negative for altered mental status and substance abuse.       Current Outpatient Medications on File Prior to Visit   Medication Sig    diphenhydrAMINE (BENADRYL) 25 mg capsule Take 25 mg by mouth. Before infusions    fluticasone-umeclidin-vilanter (TRELEGY ELLIPTA) 200-62.5-25 mcg inhaler Inhale 1 puff into the lungs once daily.    gabapentin (NEURONTIN) 300 MG capsule Take 300 mg by mouth every evening.     hydroCHLOROthiazide (HYDRODIURIL) 12.5 MG Tab TAKE 1 TABLET BY MOUTH EVERY DAY    Immune Globulin G, IGG,-PRO-IGA 10 % injection, Privigen, (PRIVIGEN) 10 % Soln Inject 300 mLs (30 g total) into the vein every 28 days.    predniSONE (DELTASONE) 10 MG tablet TAKE 1 TABLET BY MOUTH EVERY DAY AS NEEDED    tiZANidine (ZANAFLEX) 4 MG tablet TAKE 1 TABLET BY MOUTH TWICE A DAY AS NEEDED     No current facility-administered medications on file prior to visit.       Objective:   Objective:  Wt Readings from Last 3 Encounters:   06/02/22 76 kg (167 lb 8.8 oz)   05/04/22  75 kg (165 lb 5.5 oz)   04/20/22 74.9 kg (165 lb 2 oz)     Temp Readings from Last 3 Encounters:   05/04/22 97.9 °F (36.6 °C)   04/12/22 98.3 °F (36.8 °C)   04/07/22 98 °F (36.7 °C)     BP Readings from Last 3 Encounters:   06/02/22 130/82   05/04/22 (!) 160/85   04/20/22 (!) 142/88     Pulse Readings from Last 3 Encounters:   06/02/22 81   05/04/22 75   04/12/22 76       Physical Exam  Vitals reviewed.   Constitutional:       Appearance: She is well-developed.   HENT:      Head: Normocephalic and atraumatic.   Eyes:      General: No scleral icterus.     Conjunctiva/sclera: Conjunctivae normal.   Cardiovascular:      Rate and Rhythm: Normal rate and regular rhythm.      Pulses: Intact distal pulses.      Heart sounds: Normal heart sounds. No murmur heard.  Pulmonary:      Effort: No respiratory distress.      Breath sounds: No wheezing or rales.   Chest:      Chest wall: No tenderness.   Abdominal:      General: Bowel sounds are normal. There is no distension.      Palpations: Abdomen is soft.      Tenderness: There is no guarding.   Musculoskeletal:         General: Normal range of motion.      Cervical back: Normal range of motion and neck supple.   Skin:     General: Skin is warm.   Neurological:      Mental Status: She is alert and oriented to person, place, and time.         Lab Results   Component Value Date    CHOL 197 04/14/2022    CHOL 174 03/12/2021     Lab Results   Component Value Date    HDL 64 04/14/2022    HDL 51 03/12/2021     Lab Results   Component Value Date    LDLCALC 115.6 04/14/2022    LDLCALC 102.4 03/12/2021     Lab Results   Component Value Date    TRIG 87 04/14/2022    TRIG 103 03/12/2021     Lab Results   Component Value Date    CHOLHDL 32.5 04/14/2022    CHOLHDL 29.3 03/12/2021       Chemistry        Component Value Date/Time     04/14/2022 0833    K 3.9 04/14/2022 0833     04/14/2022 0833    CO2 24 04/14/2022 0833    BUN 21 04/14/2022 0833    CREATININE 0.9 04/14/2022 0827     GLU 98 04/14/2022 0833        Component Value Date/Time    CALCIUM 10.0 04/14/2022 0833    ALKPHOS 67 04/14/2022 0833    AST 14 04/14/2022 0833    ALT 9 (L) 04/14/2022 0833    BILITOT 0.6 04/14/2022 0833    ESTGFRAFRICA >60.0 04/14/2022 0833    EGFRNONAA >60.0 04/14/2022 0833          Lab Results   Component Value Date    TSH 1.792 04/14/2022     No results found for: INR, PROTIME  Lab Results   Component Value Date    WBC 5.72 04/14/2022    HGB 15.7 04/14/2022    HCT 46.2 04/14/2022     (H) 04/14/2022     04/14/2022     BNP  @LABRCNTIP(BNP,BNPTRIAGEBLO)@  CrCl cannot be calculated (Patient's most recent lab result is older than the maximum 7 days allowed.).     Imaging:  ======  No results found for this or any previous visit.    No results found for this or any previous visit.    No results found for this or any previous visit.    No results found for this or any previous visit.  Results for orders placed during the hospital encounter of 06/15/21    Echo    Interpretation Summary  · The left ventricle is normal in size with normal systolic function.  · The estimated ejection fraction is 65%.  · Normal left ventricular diastolic function.  · Normal right ventricular size with normal right ventricular systolic function.  · The ascending aorta is mildly dilated.  · The sinuses of Valsalva is mildly dilated.  · Mild mitral regurgitation.  · Mild aortic regurgitation.      Diagnostic Results:  ECG: Reviewed, no dynamic changes , no evidence of prior MI   Sinus arrhyhtmia, left axis deviation     The 10-year ASCVD risk score ([place] REY Jr., et al., 2013) is: 5.1%    Values used to calculate the score:      Age: 62 years      Sex: Female      Is Non- : No      Diabetic: No      Tobacco smoker: No      Systolic Blood Pressure: 130 mmHg      Is BP treated: Yes      HDL Cholesterol: 64 mg/dL      Total Cholesterol: 197 mg/dL    Assessment and Plan:   Essential  hypertension    Hemochromatosis, unspecified hemochromatosis type    Chronic obstructive pulmonary disease, unspecified COPD type    Hyperlipidemia, unspecified hyperlipidemia type    CVID (common variable immunodeficiency)      Had a normal stress test in 2018, [facility], ran for 8 mins without chest pain, and was told normal   Good functional status, METS>4   Low salt diet   Weight loss   Exercise in the form of walking 30 min+ a day   No active cardiac issues, no chest pain   Normal cardiac exam   CW HCTZ   Add toprol   Follow up in 12 months

## 2022-07-06 ENCOUNTER — PATIENT MESSAGE (OUTPATIENT)
Dept: ALLERGY | Facility: CLINIC | Age: 62
End: 2022-07-06
Payer: COMMERCIAL

## 2022-07-06 DIAGNOSIS — J45.40 MODERATE PERSISTENT ASTHMA WITHOUT COMPLICATION: Primary | ICD-10-CM

## 2022-07-06 DIAGNOSIS — J44.9 CHRONIC OBSTRUCTIVE PULMONARY DISEASE, UNSPECIFIED COPD TYPE: ICD-10-CM

## 2022-07-06 RX ORDER — FLUTICASONE FUROATE, UMECLIDINIUM BROMIDE AND VILANTEROL TRIFENATATE 200; 62.5; 25 UG/1; UG/1; UG/1
1 POWDER RESPIRATORY (INHALATION) DAILY
Qty: 30 EACH | Refills: 3 | Status: SHIPPED | OUTPATIENT
Start: 2022-07-06 | End: 2022-08-05

## 2022-07-28 ENCOUNTER — OFFICE VISIT (OUTPATIENT)
Dept: ALLERGY | Facility: CLINIC | Age: 62
End: 2022-07-28
Payer: COMMERCIAL

## 2022-07-28 VITALS
SYSTOLIC BLOOD PRESSURE: 156 MMHG | WEIGHT: 167 LBS | HEART RATE: 66 BPM | BODY MASS INDEX: 27.79 KG/M2 | DIASTOLIC BLOOD PRESSURE: 78 MMHG | TEMPERATURE: 98 F

## 2022-07-28 DIAGNOSIS — D83.9 CVID (COMMON VARIABLE IMMUNODEFICIENCY): Primary | ICD-10-CM

## 2022-07-28 DIAGNOSIS — J40 BRONCHITIS: ICD-10-CM

## 2022-07-28 DIAGNOSIS — Z87.891 EX-CIGARETTE SMOKER: ICD-10-CM

## 2022-07-28 DIAGNOSIS — J44.89 ASTHMA-COPD OVERLAP SYNDROME: ICD-10-CM

## 2022-07-28 DIAGNOSIS — J32.9 RECURRENT SINUSITIS: ICD-10-CM

## 2022-07-28 DIAGNOSIS — E83.119 HEMOCHROMATOSIS, UNSPECIFIED HEMOCHROMATOSIS TYPE: ICD-10-CM

## 2022-07-28 DIAGNOSIS — R53.83 MALAISE AND FATIGUE: ICD-10-CM

## 2022-07-28 DIAGNOSIS — R53.81 MALAISE AND FATIGUE: ICD-10-CM

## 2022-07-28 DIAGNOSIS — R05.9 COUGH IN ADULT: ICD-10-CM

## 2022-07-28 PROCEDURE — 3077F PR MOST RECENT SYSTOLIC BLOOD PRESSURE >= 140 MM HG: ICD-10-PCS | Mod: CPTII,S$GLB,, | Performed by: SPECIALIST

## 2022-07-28 PROCEDURE — 3077F SYST BP >= 140 MM HG: CPT | Mod: CPTII,S$GLB,, | Performed by: SPECIALIST

## 2022-07-28 PROCEDURE — 99214 OFFICE O/P EST MOD 30 MIN: CPT | Mod: S$GLB,,, | Performed by: SPECIALIST

## 2022-07-28 PROCEDURE — 1159F PR MEDICATION LIST DOCUMENTED IN MEDICAL RECORD: ICD-10-PCS | Mod: CPTII,S$GLB,, | Performed by: SPECIALIST

## 2022-07-28 PROCEDURE — 1159F MED LIST DOCD IN RCRD: CPT | Mod: CPTII,S$GLB,, | Performed by: SPECIALIST

## 2022-07-28 PROCEDURE — 99999 PR PBB SHADOW E&M-EST. PATIENT-LVL III: CPT | Mod: PBBFAC,,, | Performed by: SPECIALIST

## 2022-07-28 PROCEDURE — 3078F PR MOST RECENT DIASTOLIC BLOOD PRESSURE < 80 MM HG: ICD-10-PCS | Mod: CPTII,S$GLB,, | Performed by: SPECIALIST

## 2022-07-28 PROCEDURE — 3008F BODY MASS INDEX DOCD: CPT | Mod: CPTII,S$GLB,, | Performed by: SPECIALIST

## 2022-07-28 PROCEDURE — 3044F PR MOST RECENT HEMOGLOBIN A1C LEVEL <7.0%: ICD-10-PCS | Mod: CPTII,S$GLB,, | Performed by: SPECIALIST

## 2022-07-28 PROCEDURE — 3008F PR BODY MASS INDEX (BMI) DOCUMENTED: ICD-10-PCS | Mod: CPTII,S$GLB,, | Performed by: SPECIALIST

## 2022-07-28 PROCEDURE — 3044F HG A1C LEVEL LT 7.0%: CPT | Mod: CPTII,S$GLB,, | Performed by: SPECIALIST

## 2022-07-28 PROCEDURE — 1160F PR REVIEW ALL MEDS BY PRESCRIBER/CLIN PHARMACIST DOCUMENTED: ICD-10-PCS | Mod: CPTII,S$GLB,, | Performed by: SPECIALIST

## 2022-07-28 PROCEDURE — 1160F RVW MEDS BY RX/DR IN RCRD: CPT | Mod: CPTII,S$GLB,, | Performed by: SPECIALIST

## 2022-07-28 PROCEDURE — 99999 PR PBB SHADOW E&M-EST. PATIENT-LVL III: ICD-10-PCS | Mod: PBBFAC,,, | Performed by: SPECIALIST

## 2022-07-28 PROCEDURE — 3078F DIAST BP <80 MM HG: CPT | Mod: CPTII,S$GLB,, | Performed by: SPECIALIST

## 2022-07-28 PROCEDURE — 99214 PR OFFICE/OUTPT VISIT, EST, LEVL IV, 30-39 MIN: ICD-10-PCS | Mod: S$GLB,,, | Performed by: SPECIALIST

## 2022-07-28 NOTE — PROGRESS NOTES
Subjective:       Patient ID: María Julian is a 62 y.o. female.    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 She had been chronically ill with recurrent infections over 2 decades and immune workup revealed she is having CVID in the form of PIDD.  Antibiotic prophylaxis failed and she was started on antibody replacement monthly Iv Ig and she reported she has great improvement with therapy.  She maintained excellent trough level.  Her breakthrough infections are treated promptly  She was a chronic cigarette smoker - quit smoking  She is on Trelegy for asthma-COPD overlap. EIB prophylaxis with Proair PRE Rx before exercise  She has allergic reaction to macrolides and Augmentin causes severe vaginal yeast  She received 2 COVID doses and will get her third dose after 8 months of second dose  She reported to be chronically fatigued . Works for [employer]. She denies ongoing irritant exposure      Outpatient Medications Marked as Taking for the 7/28/22 encounter (Office Visit) with Dez Galindo MD   Medication Sig Dispense Refill    diphenhydrAMINE (BENADRYL) 25 mg capsule Take 25 mg by mouth. Before infusions      fluticasone-umeclidin-vilanter (TRELEGY ELLIPTA) 200-62.5-25 mcg inhaler Inhale 1 puff into the lungs once daily. 30 each 5    gabapentin (NEURONTIN) 300 MG capsule Take 300 mg by mouth every evening.       hydroCHLOROthiazide (HYDRODIURIL) 12.5 MG Tab TAKE 1 TABLET BY MOUTH EVERY DAY 90 tablet 3    Immune Globulin G, IGG,-PRO-IGA 10 % injection, Privigen, (PRIVIGEN) 10 % Soln Inject 300 mLs (30 g total) into the vein every 28 days. 300 mL 11    metoprolol succinate (TOPROL-XL) 25 MG 24 hr tablet Take 1 tablet (25 mg total) by mouth once daily. 90 tablet 3    predniSONE (DELTASONE) 10 MG tablet TAKE 1 TABLET BY MOUTH EVERY DAY AS  NEEDED 30 tablet 0        Erythromycin, Macrolide antibiotics, Iodine and iodide containing products, and Levofloxacin     Past Medical History:   Diagnosis Date    Abnormal Pap smear of cervix     Allergy     Asthma     COPD (chronic obstructive pulmonary disease)     Malignant hyperthermia     pt's mother has hx of MH.  Pt denies any complications with general anesthesia    Recurrent upper respiratory infection (URI)        Family History   Problem Relation Age of Onset    Malignant hypertension Mother        Environmental History: Dust Mite Controls: Dust mite controls are already in place.     Review of Systems   Constitutional: Positive for fatigue. Negative for fever.   HENT: Positive for congestion, postnasal drip and rhinorrhea. Negative for ear pain, sinus pressure, sinus pain, sneezing and sore throat.    Eyes: Negative.  Negative for redness and itching.   Respiratory: Positive for cough. Negative for chest tightness, shortness of breath and wheezing.    Cardiovascular: Negative.  Negative for chest pain.   Gastrointestinal: Negative.  Negative for nausea.   Endocrine: Negative.  Negative for cold intolerance.   Genitourinary: Negative.  Negative for frequency.   Musculoskeletal: Negative.  Negative for myalgias.   Skin: Negative.  Negative for rash.   Allergic/Immunologic: Negative.  Negative for environmental allergies, food allergies and immunocompromised state.   Neurological: Negative.  Negative for dizziness and headaches.   Hematological: Negative.  Negative for adenopathy.   Psychiatric/Behavioral: Negative.  Negative for sleep disturbance.       Objective:     Visit Vitals  BP (!) 156/78 (BP Location: Left arm, Patient Position: Sitting)   Pulse 66   Temp 98.1 °F (36.7 °C)   Wt 75.8 kg (167 lb)   BMI 27.79 kg/m²       Physical Exam  Vitals and nursing note reviewed.   Constitutional:       Appearance: Normal appearance. She is normal weight.   HENT:      Head: Normocephalic and atraumatic.       Right Ear: Tympanic membrane, ear canal and external ear normal.      Left Ear: Tympanic membrane, ear canal and external ear normal.      Nose: Congestion and rhinorrhea present.      Mouth/Throat:      Mouth: Mucous membranes are moist.      Pharynx: Oropharynx is clear.   Eyes:      Extraocular Movements: Extraocular movements intact.      Conjunctiva/sclera: Conjunctivae normal.      Pupils: Pupils are equal, round, and reactive to light.   Cardiovascular:      Rate and Rhythm: Normal rate and regular rhythm.      Pulses: Normal pulses.      Heart sounds: Normal heart sounds.   Pulmonary:      Effort: Pulmonary effort is normal.      Breath sounds: Normal breath sounds.   Abdominal:      General: Abdomen is flat. Bowel sounds are normal.      Palpations: Abdomen is soft.   Musculoskeletal:         General: Normal range of motion.      Cervical back: Normal range of motion and neck supple.   Skin:     General: Skin is warm and dry.      Capillary Refill: Capillary refill takes less than 2 seconds.   Neurological:      General: No focal deficit present.      Mental Status: She is alert and oriented to person, place, and time. Mental status is at baseline.   Psychiatric:         Mood and Affect: Mood normal.         Behavior: Behavior normal.         Thought Content: Thought content normal.         Judgment: Judgment normal.           Assessment:      1. CVID (common variable immunodeficiency)    2. Recurrent sinusitis    3. Bronchitis    4. Hemochromatosis, unspecified hemochromatosis type    5. Asthma-COPD overlap syndrome    6. Malaise and fatigue    7. Cough in adult    8. Ex-cigarette smoker    9       ALLERGY TO MACROLIDES from history  10     Levaquin tendinitis    Plan:     Monthly immunoglobulin infusions- Privigen 10% 30 grams-- 300 ml to be infused over 4 hours  Monitor her for short term and long term sequelae of CVID  Treat all infections promptly- responds well to 10 days of Azithromycin and 3-5  days of prednisolone  PROAIR HFA - 90 mcg 2 puff QID PRN  Trelegy 200/62.5/25 -- one puff daily  Spirogram not done  Had COVID vaccine- 2 doses- she will get third dose 8 months after her second dose.  Follow up in 3 months--- Monitor trough IgG.                  Problems Address                                                 Amount and/or Complexity                                                                      Risk       3           [] 2 or more self-limited or minor problems                      [] Limited                                                                        [] Low                  [] 1 stable chronic illness                                                  Any combination of the two                                               OTC drugs                  []Acute, uncomplicated illness or injury                            Review of prior external notes from unique source           Minor surgery with no risk factors                                                                                                               [] 1 []2  []3+                                                                                                              Review of results from each unique test                                                                                                               [] 1 []2  [] 3+                                                                                                              Order of each unique test                                                                                                               [] 1 []2  [] 3+                                                                                                              Or                                                                                                             [] Assessment requiring an independent historian      4            [x] One or more chronic illness with  exacerbation,              [x] Moderate                                                                      [x] Moderate                 Progression, or side effects of treatment                            -test documents or independent historians                        Prescription drug management                [x]  2 or more sable chronic illnesses                                    [] Independent interpretation of tests                              Minor surgery with identifiable risk                [] 1 undiagnosed new problem with uncertain prognosis    [] Discussion or management of test results                    elective major surgery                [] 1 acute illness with                systemic symptoms                                                                                                                                                              [] 1 acute complicated injury                                                                                                                                          Elective major surgery                                                                                                                                                                                                                                                                                                                                                                                                  5            [] 1 or more chronic illnesses with severe exacerbation,     [] Extensive(two from below)                                         [] High                                                                                                               [] Independent interpretation of results                         Drug therapy requiring intensive                                                                                                               []Discussion of  management or test interpretation           monitoring                                                                                                                                                                                                       Decision to de-escalate care                 [] 1 acute or chronic illness or injury that poses a threat                                                                                               Decision regarding hospitalization

## 2022-08-07 ENCOUNTER — PATIENT MESSAGE (OUTPATIENT)
Dept: CARDIOLOGY | Facility: CLINIC | Age: 62
End: 2022-08-07
Payer: COMMERCIAL

## 2022-08-23 ENCOUNTER — PATIENT MESSAGE (OUTPATIENT)
Dept: CARDIOLOGY | Facility: CLINIC | Age: 62
End: 2022-08-23
Payer: COMMERCIAL

## 2022-08-29 ENCOUNTER — PATIENT MESSAGE (OUTPATIENT)
Dept: CARDIOLOGY | Facility: CLINIC | Age: 62
End: 2022-08-29
Payer: COMMERCIAL

## 2022-08-30 DIAGNOSIS — I10 ESSENTIAL HYPERTENSION: ICD-10-CM

## 2022-08-30 DIAGNOSIS — R07.9 CHEST PAIN, UNSPECIFIED TYPE: Primary | ICD-10-CM

## 2022-08-31 ENCOUNTER — OFFICE VISIT (OUTPATIENT)
Dept: DERMATOLOGY | Facility: CLINIC | Age: 62
End: 2022-08-31
Payer: COMMERCIAL

## 2022-08-31 DIAGNOSIS — B35.1 ONYCHOMYCOSIS: Primary | ICD-10-CM

## 2022-08-31 DIAGNOSIS — D22.9 MULTIPLE BENIGN NEVI: ICD-10-CM

## 2022-08-31 DIAGNOSIS — L81.4 LENTIGINES: ICD-10-CM

## 2022-08-31 DIAGNOSIS — Z12.83 SCREENING, MALIGNANT NEOPLASM, SKIN: ICD-10-CM

## 2022-08-31 DIAGNOSIS — L82.1 SEBORRHEIC KERATOSES: ICD-10-CM

## 2022-08-31 PROCEDURE — 99214 OFFICE O/P EST MOD 30 MIN: CPT | Mod: S$GLB,,, | Performed by: STUDENT IN AN ORGANIZED HEALTH CARE EDUCATION/TRAINING PROGRAM

## 2022-08-31 PROCEDURE — 99999 PR PBB SHADOW E&M-EST. PATIENT-LVL III: CPT | Mod: PBBFAC,,, | Performed by: STUDENT IN AN ORGANIZED HEALTH CARE EDUCATION/TRAINING PROGRAM

## 2022-08-31 PROCEDURE — 1159F MED LIST DOCD IN RCRD: CPT | Mod: CPTII,S$GLB,, | Performed by: STUDENT IN AN ORGANIZED HEALTH CARE EDUCATION/TRAINING PROGRAM

## 2022-08-31 PROCEDURE — 99214 PR OFFICE/OUTPT VISIT, EST, LEVL IV, 30-39 MIN: ICD-10-PCS | Mod: S$GLB,,, | Performed by: STUDENT IN AN ORGANIZED HEALTH CARE EDUCATION/TRAINING PROGRAM

## 2022-08-31 PROCEDURE — 99999 PR PBB SHADOW E&M-EST. PATIENT-LVL III: ICD-10-PCS | Mod: PBBFAC,,, | Performed by: STUDENT IN AN ORGANIZED HEALTH CARE EDUCATION/TRAINING PROGRAM

## 2022-08-31 PROCEDURE — 1159F PR MEDICATION LIST DOCUMENTED IN MEDICAL RECORD: ICD-10-PCS | Mod: CPTII,S$GLB,, | Performed by: STUDENT IN AN ORGANIZED HEALTH CARE EDUCATION/TRAINING PROGRAM

## 2022-08-31 PROCEDURE — 3044F PR MOST RECENT HEMOGLOBIN A1C LEVEL <7.0%: ICD-10-PCS | Mod: CPTII,S$GLB,, | Performed by: STUDENT IN AN ORGANIZED HEALTH CARE EDUCATION/TRAINING PROGRAM

## 2022-08-31 PROCEDURE — 1160F PR REVIEW ALL MEDS BY PRESCRIBER/CLIN PHARMACIST DOCUMENTED: ICD-10-PCS | Mod: CPTII,S$GLB,, | Performed by: STUDENT IN AN ORGANIZED HEALTH CARE EDUCATION/TRAINING PROGRAM

## 2022-08-31 PROCEDURE — 1160F RVW MEDS BY RX/DR IN RCRD: CPT | Mod: CPTII,S$GLB,, | Performed by: STUDENT IN AN ORGANIZED HEALTH CARE EDUCATION/TRAINING PROGRAM

## 2022-08-31 PROCEDURE — 3044F HG A1C LEVEL LT 7.0%: CPT | Mod: CPTII,S$GLB,, | Performed by: STUDENT IN AN ORGANIZED HEALTH CARE EDUCATION/TRAINING PROGRAM

## 2022-08-31 RX ORDER — CICLOPIROX 80 MG/ML
SOLUTION TOPICAL DAILY
Qty: 6.6 ML | Refills: 3 | Status: SHIPPED | OUTPATIENT
Start: 2022-08-31 | End: 2024-01-25

## 2022-08-31 NOTE — PROGRESS NOTES
Subjective:       Patient ID:  aMría Julian is a 62 y.o. female who presents for   Chief Complaint   Patient presents with    Skin Check     History of Present Illness: The patient presents for follow up of skin check.    The patient was last seen on: 3/9/21 for cryosurgery to actinic keratoses which have resolved. Denies any rapidly growing, bleeding or non healing skin lesions.     Other skin complaints: does have nail fungus she would like to have treated.         Review of Systems   Constitutional:  Negative for fever and chills.   Skin:  Negative for itching, rash and dry skin.      Objective:    Physical Exam   Constitutional: She appears well-developed and well-nourished. No distress.   Neurological: She is alert and oriented to person, place, and time. She is not disoriented.   Psychiatric: She has a normal mood and affect.   Skin:   Areas Examined (abnormalities noted in diagram):   Scalp / Hair Palpated and Inspected  Head / Face Inspection Performed  Neck Inspection Performed  Chest / Axilla Inspection Performed  Abdomen Inspection Performed  Genitals / Buttocks / Groin Inspection Performed  Back Inspection Performed  RUE Inspected  LUE Inspection Performed  RLE Inspected  LLE Inspection Performed  Nails and Digits Inspection Performed                     Diagram Legend     Erythematous scaling macule/papule c/w actinic keratosis       Vascular papule c/w angioma      Pigmented verrucoid papule/plaque c/w seborrheic keratosis      Yellow umbilicated papule c/w sebaceous hyperplasia      Irregularly shaped tan macule c/w lentigo     1-2 mm smooth white papules consistent with Milia      Movable subcutaneous cyst with punctum c/w epidermal inclusion cyst      Subcutaneous movable cyst c/w pilar cyst      Firm pink to brown papule c/w dermatofibroma      Pedunculated fleshy papule(s) c/w skin tag(s)      Evenly pigmented macule c/w junctional nevus     Mildly variegated pigmented, slightly  irregular-bordered macule c/w mildly atypical nevus      Flesh colored to evenly pigmented papule c/w intradermal nevus       Pink pearly papule/plaque c/w basal cell carcinoma      Erythematous hyperkeratotic cursted plaque c/w SCC      Surgical scar with no sign of skin cancer recurrence      Open and closed comedones      Inflammatory papules and pustules      Verrucoid papule consistent consistent with wart     Erythematous eczematous patches and plaques     Dystrophic onycholytic nail with subungual debris c/w onychomycosis     Umbilicated papule    Erythematous-base heme-crusted tan verrucoid plaque consistent with inflamed seborrheic keratosis     Erythematous Silvery Scaling Plaque c/w Psoriasis     See annotation      Assessment / Plan:        Onychomycosis  -     ciclopirox (PENLAC) 8 % Soln; Apply topically once daily. To the toenails.  Dispense: 6.6 mL; Refill: 3    Seborrheic keratoses  These are benign inherited growths without a malignant potential. Reassurance given to patient. No treatment is necessary.     Lentigines  This is a benign hyperpigmented sun induced lesion. Recommend daily sun protection/avoidance and use of at least SPF 30, broad spectrum sunscreen (OTC drug) will reduce the number of new lesions. Treatment of these benign lesions are considered cosmetic.    Multiple benign nevi  Screening, malignant neoplasm, skin  Upper and lower body skin examination performed today including at least 10 points as noted in physical examination. No lesions suspicious for malignancy noted.  Reassurance provided.    Instructed patient to observe lesion(s) for changes and follow up in clinic if changes are noted. Patient to monitor skin at home for new or changing lesions and follow up in clinic if noted.    Discussed ABCDE's of moles and brochure provided.             No follow-ups on file.

## 2022-10-04 ENCOUNTER — OFFICE VISIT (OUTPATIENT)
Dept: OBSTETRICS AND GYNECOLOGY | Facility: CLINIC | Age: 62
End: 2022-10-04
Payer: COMMERCIAL

## 2022-10-04 VITALS
BODY MASS INDEX: 28.47 KG/M2 | DIASTOLIC BLOOD PRESSURE: 75 MMHG | SYSTOLIC BLOOD PRESSURE: 130 MMHG | HEIGHT: 65 IN | WEIGHT: 170.88 LBS

## 2022-10-04 DIAGNOSIS — D07.1 VIN III (VULVAR INTRAEPITHELIAL NEOPLASIA III): ICD-10-CM

## 2022-10-04 DIAGNOSIS — N90.89 VULVAL LESION: Primary | ICD-10-CM

## 2022-10-04 PROCEDURE — 1160F RVW MEDS BY RX/DR IN RCRD: CPT | Mod: CPTII,S$GLB,, | Performed by: NURSE PRACTITIONER

## 2022-10-04 PROCEDURE — 3044F PR MOST RECENT HEMOGLOBIN A1C LEVEL <7.0%: ICD-10-PCS | Mod: CPTII,S$GLB,, | Performed by: NURSE PRACTITIONER

## 2022-10-04 PROCEDURE — 99999 PR PBB SHADOW E&M-EST. PATIENT-LVL III: CPT | Mod: PBBFAC,,, | Performed by: NURSE PRACTITIONER

## 2022-10-04 PROCEDURE — 3078F DIAST BP <80 MM HG: CPT | Mod: CPTII,S$GLB,, | Performed by: NURSE PRACTITIONER

## 2022-10-04 PROCEDURE — 1159F MED LIST DOCD IN RCRD: CPT | Mod: CPTII,S$GLB,, | Performed by: NURSE PRACTITIONER

## 2022-10-04 PROCEDURE — 3044F HG A1C LEVEL LT 7.0%: CPT | Mod: CPTII,S$GLB,, | Performed by: NURSE PRACTITIONER

## 2022-10-04 PROCEDURE — 1159F PR MEDICATION LIST DOCUMENTED IN MEDICAL RECORD: ICD-10-PCS | Mod: CPTII,S$GLB,, | Performed by: NURSE PRACTITIONER

## 2022-10-04 PROCEDURE — 3008F BODY MASS INDEX DOCD: CPT | Mod: CPTII,S$GLB,, | Performed by: NURSE PRACTITIONER

## 2022-10-04 PROCEDURE — 3075F PR MOST RECENT SYSTOLIC BLOOD PRESS GE 130-139MM HG: ICD-10-PCS | Mod: CPTII,S$GLB,, | Performed by: NURSE PRACTITIONER

## 2022-10-04 PROCEDURE — 3078F PR MOST RECENT DIASTOLIC BLOOD PRESSURE < 80 MM HG: ICD-10-PCS | Mod: CPTII,S$GLB,, | Performed by: NURSE PRACTITIONER

## 2022-10-04 PROCEDURE — 99999 PR PBB SHADOW E&M-EST. PATIENT-LVL III: ICD-10-PCS | Mod: PBBFAC,,, | Performed by: NURSE PRACTITIONER

## 2022-10-04 PROCEDURE — 3008F PR BODY MASS INDEX (BMI) DOCUMENTED: ICD-10-PCS | Mod: CPTII,S$GLB,, | Performed by: NURSE PRACTITIONER

## 2022-10-04 PROCEDURE — 3075F SYST BP GE 130 - 139MM HG: CPT | Mod: CPTII,S$GLB,, | Performed by: NURSE PRACTITIONER

## 2022-10-04 PROCEDURE — 99213 PR OFFICE/OUTPT VISIT, EST, LEVL III, 20-29 MIN: ICD-10-PCS | Mod: S$GLB,,, | Performed by: NURSE PRACTITIONER

## 2022-10-04 PROCEDURE — 99213 OFFICE O/P EST LOW 20 MIN: CPT | Mod: S$GLB,,, | Performed by: NURSE PRACTITIONER

## 2022-10-04 PROCEDURE — 1160F PR REVIEW ALL MEDS BY PRESCRIBER/CLIN PHARMACIST DOCUMENTED: ICD-10-PCS | Mod: CPTII,S$GLB,, | Performed by: NURSE PRACTITIONER

## 2022-10-04 NOTE — PROGRESS NOTES
María Julian is a 62 y.o. female  presents for recheck of vaginal lesions.   She had lesions in 2021 removed showing CELENA III, Dr. Allen ablated and I saw back for annual exam.  Noted 3 lesions at that time and this is 6 mth recheck of those lesions.    LMP: No LMP recorded. Patient has had a hysterectomy..  No issues, problems, or complaints.      Past Medical History:   Diagnosis Date    Abnormal Pap smear of cervix     Allergy     Asthma     COPD (chronic obstructive pulmonary disease)     Malignant hyperthermia     pt's mother has hx of MH.  Pt denies any complications with general anesthesia    Recurrent upper respiratory infection (URI)      Past Surgical History:   Procedure Laterality Date    BONE SPUR EXCISION SHOULDER Right     CARPAL TUNNEL RELEASE Left      SECTION      x 2    CHOLECYSTECTOMY  2018    DILATION AND CURETTAGE OF UTERUS      EXAMINATION UNDER ANESTHESIA N/A 2021    Procedure: Exam Under Anesthesia;  Surgeon: Don Allen MD;  Location: [facility] OR;  Service: Oncology;  Laterality: N/A;    FOOT TENDON SURGERY Bilateral     HYSTERECTOMY      fibroid    INJECTION OF ANESTHETIC AGENT AROUND MEDIAL BRANCH NERVES INNERVATING LUMBAR FACET JOINT Right 10/28/2021    Procedure: Block-nerve-medial branch-lumbar Right L3, 4,5 MBB with RN IV sedation;  Surgeon: Lei Copeland MD;  Location: [facility] [place] [facility];  Service: Pain Management;  Laterality: Right;    INJECTION OF ANESTHETIC AGENT AROUND MEDIAL BRANCH NERVES INNERVATING LUMBAR FACET JOINT Bilateral 2021    Procedure: Block-nerve-medial branch-lumbar bilateral L3, 4,5 MBB RN IV sedation;  Surgeon: Lei Copeland MD;  Location: [facility] [place] [facility];  Service: Pain Management;  Laterality: Bilateral;    INJECTION OF ANESTHETIC AGENT AROUND MEDIAL BRANCH NERVES INNERVATING LUMBAR FACET JOINT Bilateral 3/14/2022    Procedure: Block-nerve-medial branch-lumbar L3, 4 5 NO STEROIDS RN IV sedation;  Surgeon: Lei Copeland  MD;  Location: [facility];  Service: Pain Management;  Laterality: Bilateral;    INJECTION OF ANESTHETIC AGENT AROUND MEDIAL BRANCH NERVES INNERVATING LUMBAR FACET JOINT Bilateral 3/21/2022    Procedure: Block-nerve-medial branch-lumbar bilateral L3, 4,5 RN IV sedation NO STERIOD DIAGNOSTIC ONLY;  Surgeon: Lei Copeland MD;  Location: [facility]IN [facility]GT;  Service: Pain Management;  Laterality: Bilateral;    LASER ABLATION N/A 2021    Procedure: ABLATION, USING LASER;  Surgeon: Don Allen MD;  Location: [facility] OR;  Service: Oncology;  Laterality: N/A;    MICRODISCECTOMY OF SPINE      OOPHORECTOMY Bilateral     RADIOFREQUENCY THERMOCOAGULATION Bilateral 2022    Procedure: RADIOFREQUENCY THERMAL COAGULATION bilateral L3, 4,5 mbb RFA RN IV sedation;  Surgeon: Lei Copeland MD;  Location: [facility]IN [facility]GT;  Service: Pain Management;  Laterality: Bilateral;    TONSILLECTOMY      TRIGGER FINGER RELEASE Left     TUBAL LIGATION Bilateral      Social History     Socioeconomic History    Marital status:    Tobacco Use    Smoking status: Former     Types: Cigarettes     Quit date: 2018     Years since quittin.0    Smokeless tobacco: Never   Substance and Sexual Activity    Alcohol use: Yes     Comment: socially  No alcohol 72h prior to sx    Drug use: Never    Sexual activity: Not Currently     Social Determinants of Health     Financial Resource Strain: Low Risk     Difficulty of Paying Living Expenses: Not hard at all   Food Insecurity: No Food Insecurity    Worried About Running Out of Food in the Last Year: Never true    Ran Out of Food in the Last Year: Never true   Transportation Needs: No Transportation Needs    Lack of Transportation (Medical): No    Lack of Transportation (Non-Medical): No   Physical Activity: Insufficiently Active    Days of Exercise per Week: 2 days    Minutes of Exercise per Session: 10 min   Stress: No Stress Concern Present    Feeling of Stress : Only a little   Social  "Connections: Unknown    Frequency of Communication with Friends and Family: More than three times a week    Frequency of Social Gatherings with Friends and Family: Once a week    Active Member of Clubs or Organizations: No    Attends Club or Organization Meetings: Never    Marital Status:    Housing Stability: Low Risk     Unable to Pay for Housing in the Last Year: No    Number of Places Lived in the Last Year: 1    Unstable Housing in the Last Year: No     Family History   Problem Relation Age of Onset    Malignant hypertension Mother      OB History          3    Para   2    Term   2            AB   1    Living   2         SAB        IAB        Ectopic        Multiple        Live Births                     /75   Ht 5' 5" (1.651 m)   Wt 77.5 kg (170 lb 13.7 oz)   BMI 28.43 kg/m²       ROS:  GENERAL: Denies weight gain or weight loss. Feeling well overall.   SKIN: Denies rash or lesions.   HEAD: Denies head injury or headache.   NODES: Denies enlarged lymph nodes.   CHEST: Denies chest pain or shortness of breath.   CARDIOVASCULAR: Denies palpitations or left sided chest pain.   ABDOMEN: No abdominal pain, constipation, diarrhea, nausea, vomiting or rectal bleeding.   URINARY: No frequency, dysuria, hematuria, or burning on urination.  REPRODUCTIVE: See HPI.   BREASTS: The patient performs breast self-examination and denies pain, lumps, or nipple discharge.   HEMATOLOGIC: No easy bruisability or excessive bleeding.   MUSCULOSKELETAL: Denies joint pain or swelling.   NEUROLOGIC: Denies syncope or weakness.   PSYCHIATRIC: Denies depression, anxiety or mood swings.    PHYSICAL EXAM:  APPEARANCE: Well nourished, well developed, in no acute distress.  AFFECT: WNL, alert and oriented x 3  SKIN: No acne or hirsutism  PELVIC: Normal external genitalia without lesions.  Normal hair distribution.  Remains with lesions as noted from April   Recommend removal of right mid labia lesion and biopsy " of larger right lower perineal lesion  Left Buttock lesion appears unchanged and is flat on skin     Physical Exam    1. Vulval lesion        2. CELENA III (vulvar intraepithelial neoplasia III)         AND PLAN:    Patient was counseled today on A.C.S. Pap guidelines and recommendations for yearly pelvic exams, mammograms and monthly self breast exams; to see her PCP for other health maintenance.

## 2022-10-25 ENCOUNTER — PROCEDURE VISIT (OUTPATIENT)
Dept: OBSTETRICS AND GYNECOLOGY | Facility: CLINIC | Age: 62
End: 2022-10-25
Payer: COMMERCIAL

## 2022-10-25 VITALS
BODY MASS INDEX: 28.79 KG/M2 | SYSTOLIC BLOOD PRESSURE: 128 MMHG | WEIGHT: 172.81 LBS | DIASTOLIC BLOOD PRESSURE: 74 MMHG | HEIGHT: 65 IN

## 2022-10-25 DIAGNOSIS — D07.1 VIN III (VULVAR INTRAEPITHELIAL NEOPLASIA III): ICD-10-CM

## 2022-10-25 DIAGNOSIS — D23.5: ICD-10-CM

## 2022-10-25 DIAGNOSIS — N90.89 VULVAL LESION: Primary | ICD-10-CM

## 2022-10-25 PROCEDURE — 88305 TISSUE EXAM BY PATHOLOGIST: CPT | Mod: 26,,, | Performed by: STUDENT IN AN ORGANIZED HEALTH CARE EDUCATION/TRAINING PROGRAM

## 2022-10-25 PROCEDURE — 88305 TISSUE EXAM BY PATHOLOGIST: CPT | Performed by: STUDENT IN AN ORGANIZED HEALTH CARE EDUCATION/TRAINING PROGRAM

## 2022-10-25 PROCEDURE — 88305 TISSUE EXAM BY PATHOLOGIST: ICD-10-PCS | Mod: 26,59,, | Performed by: STUDENT IN AN ORGANIZED HEALTH CARE EDUCATION/TRAINING PROGRAM

## 2022-11-01 ENCOUNTER — PATIENT MESSAGE (OUTPATIENT)
Dept: OBSTETRICS AND GYNECOLOGY | Facility: CLINIC | Age: 62
End: 2022-11-01
Payer: COMMERCIAL

## 2022-11-01 NOTE — PROGRESS NOTES
Subjective:       Patient ID: María Julian is a 62 y.o. female.    Chief Complaint:    Has the PIDD--- CVID-- Common Variable Immuno Deficiency and recurrent infections.    HPI:   female, 62 year old with recurrent URIs / LRIs / sinusitis and bronchitis-- cough, wheeze and dyspnea was evaluated for possible humoral immuno deficiency. 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  Once the antibiotic prophylaxis failed, she was started on monthly IVIG infusions with tremendous relief of symptoms.  She preferred IVIG over weekly subcutaneous antibody infusions.  Breakthrough infections are treated with an antibiotic and steroid, as needed. Ms Julian had smoked cigarettes for decades-- and has Asthma - COPD.  She is doing well on Trelegy Ellipta.  Has Proair for use before exercise.  SHE IS WELL VERSED WITH ALLERGEN AVOIDANCE AND ASTHMA TRIGGERS.  She is an  at the [employer]. No ongoing allergens or irritants exposure.  No occupational exposure to asbestos, silica or hernan chemicals.  May undergo per protocol chest CT to screen for lung cancer.  Has chronic malaise and fatigue. Has hemochromatosis- must follow up with the GI.           Erythromycin, Macrolide antibiotics, Iodine and iodide containing products, and Levofloxacin --- are the reported drug allergies.    Past Medical History:   Diagnosis Date    Abnormal Pap smear of cervix     Allergy     Asthma     COPD (chronic obstructive pulmonary disease)     Malignant hyperthermia     pt's mother has hx of MH.  Pt denies any complications with general anesthesia    Recurrent upper respiratory infection (URI)        Family History   Problem Relation Age of Onset    Malignant hypertension Mother        Environmental History: Dust Mite Controls: Dust mite controls are already in place.     Review of Systems   Constitutional:  Positive for fatigue. Negative for fever.    HENT:  Positive for congestion, postnasal drip and rhinorrhea. Negative for ear pain, sinus pressure, sinus pain, sneezing and sore throat.    Eyes:  Positive for itching. Negative for redness.   Respiratory:  Positive for cough and chest tightness. Negative for shortness of breath and wheezing.    Cardiovascular: Negative.  Negative for chest pain.   Gastrointestinal: Negative.  Negative for nausea.   Endocrine: Negative.  Negative for cold intolerance.   Genitourinary: Negative.  Negative for frequency.   Musculoskeletal: Negative.  Negative for myalgias.   Skin: Negative.  Negative for rash.   Allergic/Immunologic: Positive for environmental allergies. Negative for food allergies and immunocompromised state.   Neurological: Negative.  Negative for dizziness and headaches.   Hematological: Negative.  Negative for adenopathy.   Psychiatric/Behavioral: Negative.  Negative for sleep disturbance.      Objective:     There were no vitals taken for this visit.    Physical Exam  Vitals and nursing note reviewed.   Constitutional:       Appearance: Normal appearance. She is normal weight.   HENT:      Head: Normocephalic and atraumatic.      Right Ear: Tympanic membrane, ear canal and external ear normal.      Left Ear: Tympanic membrane, ear canal and external ear normal.      Nose: Congestion and rhinorrhea present.      Mouth/Throat:      Mouth: Mucous membranes are moist.      Pharynx: Oropharynx is clear.   Eyes:      Extraocular Movements: Extraocular movements intact.      Conjunctiva/sclera: Conjunctivae normal.      Pupils: Pupils are equal, round, and reactive to light.   Cardiovascular:      Rate and Rhythm: Normal rate and regular rhythm.      Pulses: Normal pulses.      Heart sounds: Normal heart sounds.   Pulmonary:      Effort: Pulmonary effort is normal.      Breath sounds: Normal breath sounds.   Abdominal:      General: Abdomen is flat. Bowel sounds are normal.      Palpations: Abdomen is soft.    Musculoskeletal:         General: Normal range of motion.      Cervical back: Normal range of motion and neck supple.   Skin:     General: Skin is warm and dry.      Capillary Refill: Capillary refill takes less than 2 seconds.   Neurological:      General: No focal deficit present.      Mental Status: She is alert and oriented to person, place, and time. Mental status is at baseline.   Psychiatric:         Mood and Affect: Mood normal.         Behavior: Behavior normal.         Thought Content: Thought content normal.         Judgment: Judgment normal.       Assessment:      1. CVID (common variable immunodeficiency)    2. Recurrent sinusitis    3. Bronchitis    4. Malaise and fatigue    5. Asthma-COPD overlap syndrome    6. Hemochromatosis, unspecified hemochromatosis type    7. Ex-cigarette smoker    8. Tenonitis of orbit, unspecified laterality    9. Allergy to macrolide    10. Allergy to levofloxacin    11. Cough in adult    12      Ex cigarette smoker--- May do pulmonology consult and annual screening CT of chest, per protocol.    Plan:     Spirogram done and results discussed.  In the past Augmentin and Biaxin caused severe vaginal candidiasis  FEV1 --69-- % pred, FVC-89--- % pred, FEV1 / FVC----77- % and FEF 25- 75 %--45 -- %Treat all infections-- Had taken Zithromax before. and as needed Prednisone-  Re emphasized environmental control measures.  Nonthly IVIG Privigen 10 %-- -- 30 grams--- 300 ml--- infused over 4 hours.  Monitor trough IgG q 3- 4 months.  Monitor for short nd long term sequelae of CVID.  NOT ON AIT --- SCIT OR SLIT  Trelegy 200 / 62.5 / 25-- one puff once daily  Proair 90 mcg-- 2 puffs tid prn  Follow up with the GI specialist and the PCP.  Annual influenza vaccinations  Had 3 doses of COVID vaccine  Follow up in 3 months                  Problems Address                                                 Amount and/or Complexity                                                                       Risk       3           [] 2 or more self-limited or minor problems                      [] Limited                                                                        [] Low                  [] 1 stable chronic illness                                                  Any combination of the two                                               OTC drugs                  []Acute, uncomplicated illness or injury                            Review of prior external notes from unique source           Minor surgery with no risk factors                                                                                                               [] 1 []2  []3+                                                                                                              Review of results from each unique test                                                                                                               [] 1 []2  [] 3+                                                                                                              Order of each unique test                                                                                                               [] 1 []2  [] 3+                                                                                                              Or                                                                                                             [] Assessment requiring an independent historian      4            [x] One or more chronic illness with exacerbation,              [x] Moderate                                                                      [x] Moderate                 Progression, or side effects of treatment                            -test documents or independent historians                        Prescription drug management                [x]  2 or more sable chronic illnesses                                    [] Independent interpretation of  tests                              Minor surgery with identifiable risk                [] 1 undiagnosed new problem with uncertain prognosis    [] Discussion or management of test results                    elective major surgery                [] 1 acute illness with                systemic symptoms                                                                                                                                                              [] 1 acute complicated injury                                                                                                                                          Elective major surgery                                                                                                                                                                                                                                                                                                                                                                                                  5            [] 1 or more chronic illnesses with severe exacerbation,     [] Extensive(two from below)                                         [] High                                                                                                               [] Independent interpretation of results                         Drug therapy requiring intensive                                                                                                               []Discussion of management or test interpretation           monitoring                                                                                                                                                                                                       Decision to de-escalate care                 [] 1 acute or chronic illness or injury that poses a threat                                                                                                Decision regarding hospitalization

## 2022-11-02 ENCOUNTER — OFFICE VISIT (OUTPATIENT)
Dept: ALLERGY | Facility: CLINIC | Age: 62
End: 2022-11-02
Payer: COMMERCIAL

## 2022-11-02 ENCOUNTER — PATIENT MESSAGE (OUTPATIENT)
Dept: OBSTETRICS AND GYNECOLOGY | Facility: CLINIC | Age: 62
End: 2022-11-02
Payer: COMMERCIAL

## 2022-11-02 VITALS
SYSTOLIC BLOOD PRESSURE: 156 MMHG | BODY MASS INDEX: 28.95 KG/M2 | HEART RATE: 71 BPM | WEIGHT: 173.75 LBS | DIASTOLIC BLOOD PRESSURE: 84 MMHG | HEIGHT: 65 IN | TEMPERATURE: 98 F

## 2022-11-02 DIAGNOSIS — R05.9 COUGH IN ADULT PATIENT: ICD-10-CM

## 2022-11-02 DIAGNOSIS — R05.9 COUGH IN ADULT: ICD-10-CM

## 2022-11-02 DIAGNOSIS — D83.9 CVID (COMMON VARIABLE IMMUNODEFICIENCY): Primary | ICD-10-CM

## 2022-11-02 DIAGNOSIS — R53.81 MALAISE AND FATIGUE: ICD-10-CM

## 2022-11-02 DIAGNOSIS — J40 BRONCHITIS: ICD-10-CM

## 2022-11-02 DIAGNOSIS — J44.89 ASTHMA-COPD OVERLAP SYNDROME: ICD-10-CM

## 2022-11-02 DIAGNOSIS — J32.9 RECURRENT SINUSITIS: ICD-10-CM

## 2022-11-02 DIAGNOSIS — J45.40 MODERATE PERSISTENT ASTHMA WITHOUT COMPLICATION: ICD-10-CM

## 2022-11-02 DIAGNOSIS — D83.9 COMMON VARIABLE IMMUNODEFICIENCY: ICD-10-CM

## 2022-11-02 DIAGNOSIS — Z88.1: ICD-10-CM

## 2022-11-02 DIAGNOSIS — J44.9 CHRONIC OBSTRUCTIVE PULMONARY DISEASE, UNSPECIFIED COPD TYPE: ICD-10-CM

## 2022-11-02 DIAGNOSIS — Z87.891 EX-CIGARETTE SMOKER: ICD-10-CM

## 2022-11-02 DIAGNOSIS — H05.049: ICD-10-CM

## 2022-11-02 DIAGNOSIS — E83.119 HEMOCHROMATOSIS, UNSPECIFIED HEMOCHROMATOSIS TYPE: ICD-10-CM

## 2022-11-02 DIAGNOSIS — R53.83 MALAISE AND FATIGUE: ICD-10-CM

## 2022-11-02 DIAGNOSIS — Z88.1 ALLERGY TO LEVOFLOXACIN: ICD-10-CM

## 2022-11-02 LAB
FINAL PATHOLOGIC DIAGNOSIS: NORMAL
FINAL PATHOLOGIC DIAGNOSIS: NORMAL
GROSS: NORMAL
GROSS: NORMAL
Lab: NORMAL
Lab: NORMAL

## 2022-11-02 PROCEDURE — 3079F PR MOST RECENT DIASTOLIC BLOOD PRESSURE 80-89 MM HG: ICD-10-PCS | Mod: CPTII,S$GLB,, | Performed by: SPECIALIST

## 2022-11-02 PROCEDURE — 99999 PR PBB SHADOW E&M-EST. PATIENT-LVL IV: ICD-10-PCS | Mod: PBBFAC,,, | Performed by: SPECIALIST

## 2022-11-02 PROCEDURE — 3008F PR BODY MASS INDEX (BMI) DOCUMENTED: ICD-10-PCS | Mod: CPTII,S$GLB,, | Performed by: SPECIALIST

## 2022-11-02 PROCEDURE — 99214 OFFICE O/P EST MOD 30 MIN: CPT | Mod: 25,S$GLB,, | Performed by: SPECIALIST

## 2022-11-02 PROCEDURE — 3077F PR MOST RECENT SYSTOLIC BLOOD PRESSURE >= 140 MM HG: ICD-10-PCS | Mod: CPTII,S$GLB,, | Performed by: SPECIALIST

## 2022-11-02 PROCEDURE — 3044F HG A1C LEVEL LT 7.0%: CPT | Mod: CPTII,S$GLB,, | Performed by: SPECIALIST

## 2022-11-02 PROCEDURE — 3077F SYST BP >= 140 MM HG: CPT | Mod: CPTII,S$GLB,, | Performed by: SPECIALIST

## 2022-11-02 PROCEDURE — 99214 PR OFFICE/OUTPT VISIT, EST, LEVL IV, 30-39 MIN: ICD-10-PCS | Mod: 25,S$GLB,, | Performed by: SPECIALIST

## 2022-11-02 PROCEDURE — 94010 BREATHING CAPACITY TEST: ICD-10-PCS | Mod: S$GLB,,, | Performed by: SPECIALIST

## 2022-11-02 PROCEDURE — 1159F PR MEDICATION LIST DOCUMENTED IN MEDICAL RECORD: ICD-10-PCS | Mod: CPTII,S$GLB,, | Performed by: SPECIALIST

## 2022-11-02 PROCEDURE — 3044F PR MOST RECENT HEMOGLOBIN A1C LEVEL <7.0%: ICD-10-PCS | Mod: CPTII,S$GLB,, | Performed by: SPECIALIST

## 2022-11-02 PROCEDURE — 1159F MED LIST DOCD IN RCRD: CPT | Mod: CPTII,S$GLB,, | Performed by: SPECIALIST

## 2022-11-02 PROCEDURE — 94010 BREATHING CAPACITY TEST: CPT | Mod: S$GLB,,, | Performed by: SPECIALIST

## 2022-11-02 PROCEDURE — 3079F DIAST BP 80-89 MM HG: CPT | Mod: CPTII,S$GLB,, | Performed by: SPECIALIST

## 2022-11-02 PROCEDURE — 99999 PR PBB SHADOW E&M-EST. PATIENT-LVL IV: CPT | Mod: PBBFAC,,, | Performed by: SPECIALIST

## 2022-11-02 PROCEDURE — 1160F PR REVIEW ALL MEDS BY PRESCRIBER/CLIN PHARMACIST DOCUMENTED: ICD-10-PCS | Mod: CPTII,S$GLB,, | Performed by: SPECIALIST

## 2022-11-02 PROCEDURE — 3008F BODY MASS INDEX DOCD: CPT | Mod: CPTII,S$GLB,, | Performed by: SPECIALIST

## 2022-11-02 PROCEDURE — 1160F RVW MEDS BY RX/DR IN RCRD: CPT | Mod: CPTII,S$GLB,, | Performed by: SPECIALIST

## 2022-11-02 RX ORDER — AZITHROMYCIN 250 MG/1
TABLET, FILM COATED ORAL
Qty: 11 TABLET | Refills: 3 | Status: SHIPPED | OUTPATIENT
Start: 2022-11-02 | End: 2022-11-12

## 2022-12-05 ENCOUNTER — OFFICE VISIT (OUTPATIENT)
Dept: CARDIOLOGY | Facility: CLINIC | Age: 62
End: 2022-12-05
Payer: COMMERCIAL

## 2022-12-05 VITALS
BODY MASS INDEX: 29.27 KG/M2 | SYSTOLIC BLOOD PRESSURE: 152 MMHG | OXYGEN SATURATION: 97 % | WEIGHT: 175.69 LBS | HEIGHT: 65 IN | DIASTOLIC BLOOD PRESSURE: 80 MMHG | HEART RATE: 68 BPM

## 2022-12-05 DIAGNOSIS — I10 ESSENTIAL HYPERTENSION: Primary | ICD-10-CM

## 2022-12-05 DIAGNOSIS — J44.9 CHRONIC OBSTRUCTIVE PULMONARY DISEASE, UNSPECIFIED COPD TYPE: ICD-10-CM

## 2022-12-05 DIAGNOSIS — E78.5 HYPERLIPIDEMIA, UNSPECIFIED HYPERLIPIDEMIA TYPE: ICD-10-CM

## 2022-12-05 DIAGNOSIS — Z87.891 EX-CIGARETTE SMOKER: ICD-10-CM

## 2022-12-05 DIAGNOSIS — D83.9 COMMON VARIABLE IMMUNODEFICIENCY: ICD-10-CM

## 2022-12-05 PROCEDURE — 3044F HG A1C LEVEL LT 7.0%: CPT | Mod: CPTII,S$GLB,, | Performed by: INTERNAL MEDICINE

## 2022-12-05 PROCEDURE — 99999 PR PBB SHADOW E&M-EST. PATIENT-LVL III: ICD-10-PCS | Mod: PBBFAC,,, | Performed by: INTERNAL MEDICINE

## 2022-12-05 PROCEDURE — 99214 PR OFFICE/OUTPT VISIT, EST, LEVL IV, 30-39 MIN: ICD-10-PCS | Mod: S$GLB,,, | Performed by: INTERNAL MEDICINE

## 2022-12-05 PROCEDURE — 99214 OFFICE O/P EST MOD 30 MIN: CPT | Mod: S$GLB,,, | Performed by: INTERNAL MEDICINE

## 2022-12-05 PROCEDURE — 3077F SYST BP >= 140 MM HG: CPT | Mod: CPTII,S$GLB,, | Performed by: INTERNAL MEDICINE

## 2022-12-05 PROCEDURE — 3079F DIAST BP 80-89 MM HG: CPT | Mod: CPTII,S$GLB,, | Performed by: INTERNAL MEDICINE

## 2022-12-05 PROCEDURE — 3008F PR BODY MASS INDEX (BMI) DOCUMENTED: ICD-10-PCS | Mod: CPTII,S$GLB,, | Performed by: INTERNAL MEDICINE

## 2022-12-05 PROCEDURE — 3044F PR MOST RECENT HEMOGLOBIN A1C LEVEL <7.0%: ICD-10-PCS | Mod: CPTII,S$GLB,, | Performed by: INTERNAL MEDICINE

## 2022-12-05 PROCEDURE — 3079F PR MOST RECENT DIASTOLIC BLOOD PRESSURE 80-89 MM HG: ICD-10-PCS | Mod: CPTII,S$GLB,, | Performed by: INTERNAL MEDICINE

## 2022-12-05 PROCEDURE — 3008F BODY MASS INDEX DOCD: CPT | Mod: CPTII,S$GLB,, | Performed by: INTERNAL MEDICINE

## 2022-12-05 PROCEDURE — 3077F PR MOST RECENT SYSTOLIC BLOOD PRESSURE >= 140 MM HG: ICD-10-PCS | Mod: CPTII,S$GLB,, | Performed by: INTERNAL MEDICINE

## 2022-12-05 PROCEDURE — 99999 PR PBB SHADOW E&M-EST. PATIENT-LVL III: CPT | Mod: PBBFAC,,, | Performed by: INTERNAL MEDICINE

## 2022-12-05 RX ORDER — AMLODIPINE BESYLATE 2.5 MG/1
2.5 TABLET ORAL DAILY
Qty: 30 TABLET | Refills: 11 | Status: SHIPPED | OUTPATIENT
Start: 2022-12-05 | End: 2023-11-29 | Stop reason: SDUPTHER

## 2022-12-05 NOTE — PROGRESS NOTES
Subjective:   Patient ID:  María Julian is a 62 y.o. female who presents for evaluation of No chief complaint on file.      HPI   12/05/2022.    Comes in for six-month follow-up.    She states that the Toprol that she was prescribed for palpitations did help with the palpitations however she was feeling very fatigued so she stopped it.    She is still on hydrochlorothiazide.    Her blood pressure runs at home between 120 and 150.    Her blood pressure today on my last check was 160/95.        6.2.2022  Comes in for follow up , overall she is doing better since last visit, since she states her back pain feels better after RFA  She states she has been having higher BP at home and on visit   Her echo didn't show low EF or cardiomyopathy, done since hx of hemochromatosis   Started on hctz last visit with better numbers overall except for some higher   No chest pain, no dyspnea, no palpitations  Also under some work stress       5.2021  60 yo female, ex smoker quit in 2018, 45 pqy, elevated BP without hx of HTN, BP ranges high normal to abnormal   She works in an office. She uses the stairs every day, 1 flight multiple times a day, without chest pain, dyspnea, syncope, presyncope, dizziness, headaches.   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  Pt denies any complications with general anesthesia    Recurrent upper respiratory infection (URI)        Past Surgical History:   Procedure Laterality Date    BONE SPUR EXCISION SHOULDER Right     CARPAL TUNNEL RELEASE  Left      SECTION      x 2    CHOLECYSTECTOMY  2018    DILATION AND CURETTAGE OF UTERUS      EXAMINATION UNDER ANESTHESIA N/A 2021    Procedure: Exam Under Anesthesia;  Surgeon: Don Allen MD;  Location: [facility] OR;  Service: Oncology;  Laterality: N/A;    FOOT TENDON SURGERY Bilateral     HYSTERECTOMY      fibroid    INJECTION OF ANESTHETIC AGENT AROUND MEDIAL BRANCH NERVES INNERVATING LUMBAR FACET JOINT Right 10/28/2021    Procedure: Block-nerve-medial branch-lumbar Right L3, 4,5 MBB with RN IV sedation;  Surgeon: Lei Copeland MD;  Location: [facility] PAIN MGT;  Service: Pain Management;  Laterality: Right;    INJECTION OF ANESTHETIC AGENT AROUND MEDIAL BRANCH NERVES INNERVATING LUMBAR FACET JOINT Bilateral 2021    Procedure: Block-nerve-medial branch-lumbar bilateral L3, 4,5 MBB RN IV sedation;  Surgeon: Lei Copeland MD;  Location: [facility] PAIN [facility]GT;  Service: Pain Management;  Laterality: Bilateral;    INJECTION OF ANESTHETIC AGENT AROUND MEDIAL BRANCH NERVES INNERVATING LUMBAR FACET JOINT Bilateral 3/14/2022    Procedure: Block-nerve-medial branch-lumbar L3, 4 5 NO STEROIDS RN IV sedation;  Surgeon: Lei Copeland MD;  Location: [facility] PAIN [facility]GT;  Service: Pain Management;  Laterality: Bilateral;    INJECTION OF ANESTHETIC AGENT AROUND MEDIAL BRANCH NERVES INNERVATING LUMBAR FACET JOINT Bilateral 3/21/2022    Procedure: Block-nerve-medial branch-lumbar bilateral L3, 4,5 RN IV sedation NO STERIOD DIAGNOSTIC ONLY;  Surgeon: Lei Copeland MD;  Location: [facility]IN [facility]GT;  Service: Pain Management;  Laterality: Bilateral;    LASER ABLATION N/A 2021    Procedure: ABLATION, USING LASER;  Surgeon: Don Allen MD;  Location: [facility] OR;  Service: Oncology;  Laterality: N/A;    MICRODISCECTOMY OF SPINE      OOPHORECTOMY Bilateral     RADIOFREQUENCY THERMOCOAGULATION Bilateral 2022    Procedure: RADIOFREQUENCY THERMAL COAGULATION bilateral L3, 4,5 mbb RFA RN IV sedation;  Surgeon: Lei Copeland MD;   Location: [facility] PAIN [facility]GT;  Service: Pain Management;  Laterality: Bilateral;    TONSILLECTOMY      TRIGGER FINGER RELEASE Left     TUBAL LIGATION Bilateral 1982       Social History     Tobacco Use    Smoking status: Former     Types: Cigarettes     Quit date: 2018     Years since quittin.2    Smokeless tobacco: Never   Substance Use Topics    Alcohol use: Yes     Comment: socially  No alcohol 72h prior to sx    Drug use: Never       Family History   Problem Relation Age of Onset    Malignant hypertension Mother        Review of Systems   Constitutional: Negative for fever and malaise/fatigue.   HENT:  Negative for sore throat.    Eyes:  Negative for blurred vision.   Cardiovascular:  Negative for chest pain, claudication, cyanosis, dyspnea on exertion, irregular heartbeat, leg swelling, near-syncope, orthopnea, palpitations, paroxysmal nocturnal dyspnea and syncope.   Respiratory:  Negative for cough and hemoptysis.    Hematologic/Lymphatic: Negative for bleeding problem.   Skin:  Negative for poor wound healing and rash.   Musculoskeletal:  Negative for back pain and falls.   Gastrointestinal:  Negative for abdominal pain.   Genitourinary:  Negative for nocturia.   Neurological:  Negative for dizziness, focal weakness, headaches, light-headedness and loss of balance.   Psychiatric/Behavioral:  Negative for altered mental status and substance abuse.      Current Outpatient Medications on File Prior to Visit   Medication Sig    ciclopirox (PENLAC) 8 % Soln Apply topically once daily. To the toenails.    diphenhydrAMINE (BENADRYL) 25 mg capsule Take 25 mg by mouth. Before infusions    fluticasone-umeclidin-vilanter (TRELEGY ELLIPTA) 200-62.5-25 mcg inhaler INHALE 1 PUFF INTO THE LUNGS EVERY DAY    gabapentin (NEURONTIN) 300 MG capsule Take 300 mg by mouth every evening.     hydroCHLOROthiazide (HYDRODIURIL) 12.5 MG Tab TAKE 1 TABLET BY MOUTH EVERY DAY    Immune Globulin G, IGG,-PRO-IGA 10 % injection,  Privigen, (PRIVIGEN) 10 % Soln Inject 300 mLs (30 g total) into the vein every 28 days.    predniSONE (DELTASONE) 10 MG tablet TAKE 1 TABLET BY MOUTH EVERY DAY AS NEEDED     No current facility-administered medications on file prior to visit.       Objective:   Objective:  Wt Readings from Last 3 Encounters:   12/05/22 79.7 kg (175 lb 11.3 oz)   11/02/22 78.8 kg (173 lb 11.6 oz)   10/25/22 78.4 kg (172 lb 13.5 oz)     Temp Readings from Last 3 Encounters:   11/02/22 97.7 °F (36.5 °C)   07/28/22 98.1 °F (36.7 °C)   05/04/22 97.9 °F (36.6 °C)     BP Readings from Last 3 Encounters:   12/05/22 (!) 152/80   11/02/22 (!) 156/84   10/25/22 128/74     Pulse Readings from Last 3 Encounters:   12/05/22 68   11/02/22 71   07/28/22 66       Physical Exam  Vitals reviewed.   Constitutional:       Appearance: She is well-developed.   HENT:      Head: Normocephalic and atraumatic.   Eyes:      General: No scleral icterus.     Conjunctiva/sclera: Conjunctivae normal.   Cardiovascular:      Rate and Rhythm: Normal rate and regular rhythm.      Pulses: Intact distal pulses.      Heart sounds: Normal heart sounds. No murmur heard.  Pulmonary:      Effort: No respiratory distress.      Breath sounds: No wheezing or rales.   Chest:      Chest wall: No tenderness.   Abdominal:      General: Bowel sounds are normal. There is no distension.      Palpations: Abdomen is soft.      Tenderness: There is no guarding.   Musculoskeletal:         General: Normal range of motion.      Cervical back: Normal range of motion and neck supple.   Skin:     General: Skin is warm.   Neurological:      Mental Status: She is alert and oriented to person, place, and time.       Lab Results   Component Value Date    CHOL 197 04/14/2022    CHOL 174 03/12/2021     Lab Results   Component Value Date    HDL 64 04/14/2022    HDL 51 03/12/2021     Lab Results   Component Value Date    LDLCALC 115.6 04/14/2022    LDLCALC 102.4 03/12/2021     Lab Results   Component  Value Date    TRIG 87 04/14/2022    TRIG 103 03/12/2021     Lab Results   Component Value Date    CHOLHDL 32.5 04/14/2022    CHOLHDL 29.3 03/12/2021       Chemistry        Component Value Date/Time     04/14/2022 0833    K 3.9 04/14/2022 0833     04/14/2022 0833    CO2 24 04/14/2022 0833    BUN 21 04/14/2022 0833    CREATININE 0.9 04/14/2022 0833    GLU 98 04/14/2022 0833        Component Value Date/Time    CALCIUM 10.0 04/14/2022 0833    ALKPHOS 67 04/14/2022 0833    AST 14 04/14/2022 0833    ALT 9 (L) 04/14/2022 0833    BILITOT 0.6 04/14/2022 0833    ESTGFRAFRICA >60.0 04/14/2022 0833    EGFRNONAA >60.0 04/14/2022 0833          Lab Results   Component Value Date    TSH 1.792 04/14/2022     No results found for: INR, PROTIME  Lab Results   Component Value Date    WBC 5.72 04/14/2022    HGB 15.7 04/14/2022    HCT 46.2 04/14/2022     (H) 04/14/2022     04/14/2022     BNP  @LABRCNTIP(BNP,BNPTRIAGEBLO)@  CrCl cannot be calculated (Patient's most recent lab result is older than the maximum 7 days allowed.).     Imaging:  ======  No results found for this or any previous visit.    No results found for this or any previous visit.    No results found for this or any previous visit.    No results found for this or any previous visit.  Results for orders placed during the hospital encounter of 06/15/21    Echo    Interpretation Summary  · The left ventricle is normal in size with normal systolic function.  · The estimated ejection fraction is 65%.  · Normal left ventricular diastolic function.  · Normal right ventricular size with normal right ventricular systolic function.  · The ascending aorta is mildly dilated.  · The sinuses of Valsalva is mildly dilated.  · Mild mitral regurgitation.  · Mild aortic regurgitation.      Diagnostic Results:  ECG: Reviewed, no dynamic changes , no evidence of prior MI   Sinus arrhyhtmia, left axis deviation     The 10-year ASCVD risk score (Vinh CURRIE, et al.,  2019) is: 6.9%    Values used to calculate the score:      Age: 62 years      Sex: Female      Is Non- : No      Diabetic: No      Tobacco smoker: No      Systolic Blood Pressure: 152 mmHg      Is BP treated: Yes      HDL Cholesterol: 64 mg/dL      Total Cholesterol: 197 mg/dL    Assessment and Plan:   Essential hypertension  -     amLODIPine (NORVASC) 2.5 MG tablet; Take 1 tablet (2.5 mg total) by mouth once daily.  Dispense: 30 tablet; Refill: 11    Common variable immunodeficiency    Chronic obstructive pulmonary disease, unspecified COPD type    Ex-cigarette smoker    Hyperlipidemia, unspecified hyperlipidemia type      Blood pressure not controlled.  Continue HCTZ.  Add low-dose Norvasc.    Did not tolerate Toprol the past.    Continue to exercise.    Low-salt diet.       Follow up in 6 months

## 2022-12-08 ENCOUNTER — OFFICE VISIT (OUTPATIENT)
Dept: OBSTETRICS AND GYNECOLOGY | Facility: CLINIC | Age: 62
End: 2022-12-08
Payer: COMMERCIAL

## 2022-12-08 ENCOUNTER — PATIENT MESSAGE (OUTPATIENT)
Dept: OBSTETRICS AND GYNECOLOGY | Facility: CLINIC | Age: 62
End: 2022-12-08

## 2022-12-08 VITALS
DIASTOLIC BLOOD PRESSURE: 82 MMHG | WEIGHT: 174.06 LBS | SYSTOLIC BLOOD PRESSURE: 144 MMHG | BODY MASS INDEX: 29 KG/M2 | HEIGHT: 65 IN

## 2022-12-08 DIAGNOSIS — A63.0 CONDYLOMA: Primary | ICD-10-CM

## 2022-12-08 DIAGNOSIS — D07.1 VIN III (VULVAR INTRAEPITHELIAL NEOPLASIA III): ICD-10-CM

## 2022-12-08 PROCEDURE — 1159F PR MEDICATION LIST DOCUMENTED IN MEDICAL RECORD: ICD-10-PCS | Mod: CPTII,S$GLB,, | Performed by: NURSE PRACTITIONER

## 2022-12-08 PROCEDURE — 1160F RVW MEDS BY RX/DR IN RCRD: CPT | Mod: CPTII,S$GLB,, | Performed by: NURSE PRACTITIONER

## 2022-12-08 PROCEDURE — 99213 PR OFFICE/OUTPT VISIT, EST, LEVL III, 20-29 MIN: ICD-10-PCS | Mod: S$GLB,,, | Performed by: NURSE PRACTITIONER

## 2022-12-08 PROCEDURE — 99999 PR PBB SHADOW E&M-EST. PATIENT-LVL III: CPT | Mod: PBBFAC,,, | Performed by: NURSE PRACTITIONER

## 2022-12-08 PROCEDURE — 3044F HG A1C LEVEL LT 7.0%: CPT | Mod: CPTII,S$GLB,, | Performed by: NURSE PRACTITIONER

## 2022-12-08 PROCEDURE — 3044F PR MOST RECENT HEMOGLOBIN A1C LEVEL <7.0%: ICD-10-PCS | Mod: CPTII,S$GLB,, | Performed by: NURSE PRACTITIONER

## 2022-12-08 PROCEDURE — 99213 OFFICE O/P EST LOW 20 MIN: CPT | Mod: S$GLB,,, | Performed by: NURSE PRACTITIONER

## 2022-12-08 PROCEDURE — 3008F PR BODY MASS INDEX (BMI) DOCUMENTED: ICD-10-PCS | Mod: CPTII,S$GLB,, | Performed by: NURSE PRACTITIONER

## 2022-12-08 PROCEDURE — 99999 PR PBB SHADOW E&M-EST. PATIENT-LVL III: ICD-10-PCS | Mod: PBBFAC,,, | Performed by: NURSE PRACTITIONER

## 2022-12-08 PROCEDURE — 1160F PR REVIEW ALL MEDS BY PRESCRIBER/CLIN PHARMACIST DOCUMENTED: ICD-10-PCS | Mod: CPTII,S$GLB,, | Performed by: NURSE PRACTITIONER

## 2022-12-08 PROCEDURE — 3079F PR MOST RECENT DIASTOLIC BLOOD PRESSURE 80-89 MM HG: ICD-10-PCS | Mod: CPTII,S$GLB,, | Performed by: NURSE PRACTITIONER

## 2022-12-08 PROCEDURE — 3077F SYST BP >= 140 MM HG: CPT | Mod: CPTII,S$GLB,, | Performed by: NURSE PRACTITIONER

## 2022-12-08 PROCEDURE — 1159F MED LIST DOCD IN RCRD: CPT | Mod: CPTII,S$GLB,, | Performed by: NURSE PRACTITIONER

## 2022-12-08 PROCEDURE — 3079F DIAST BP 80-89 MM HG: CPT | Mod: CPTII,S$GLB,, | Performed by: NURSE PRACTITIONER

## 2022-12-08 PROCEDURE — 3008F BODY MASS INDEX DOCD: CPT | Mod: CPTII,S$GLB,, | Performed by: NURSE PRACTITIONER

## 2022-12-08 PROCEDURE — 3077F PR MOST RECENT SYSTOLIC BLOOD PRESSURE >= 140 MM HG: ICD-10-PCS | Mod: CPTII,S$GLB,, | Performed by: NURSE PRACTITIONER

## 2022-12-08 RX ORDER — IMIQUIMOD 12.5 MG/.25G
CREAM TOPICAL
Qty: 24 PACKET | Refills: 0 | Status: SHIPPED | OUTPATIENT
Start: 2022-12-09 | End: 2023-05-03

## 2022-12-08 NOTE — PROGRESS NOTES
María Julian is a 62 y.o. female  presents for recheck of biopsies done in late October  Right upper vulva labia showed:  Labia, right mid, biopsy:   Condyloma    Right lower perineum punch biopsy showed:   Perineum, right, biopsy:   Fragments of a condyloma     Pt has a autoimmune immunodeficency and gets IgG infusions          LMP: No LMP recorded. Patient has had a hysterectomy..    Past Medical History:   Diagnosis Date    Abnormal Pap smear of cervix     Allergy     Asthma     COPD (chronic obstructive pulmonary disease)     Malignant hyperthermia     pt's mother has hx of MH.  Pt denies any complications with general anesthesia    Recurrent upper respiratory infection (URI)      Past Surgical History:   Procedure Laterality Date    BONE SPUR EXCISION SHOULDER Right     CARPAL TUNNEL RELEASE Left      SECTION      x 2    CHOLECYSTECTOMY  2018    DILATION AND CURETTAGE OF UTERUS      EXAMINATION UNDER ANESTHESIA N/A 2021    Procedure: Exam Under Anesthesia;  Surgeon: Don Allen MD;  Location: [facility] OR;  Service: Oncology;  Laterality: N/A;    FOOT TENDON SURGERY Bilateral     HYSTERECTOMY      fibroid    INJECTION OF ANESTHETIC AGENT AROUND MEDIAL BRANCH NERVES INNERVATING LUMBAR FACET JOINT Right 10/28/2021    Procedure: Block-nerve-medial branch-lumbar Right L3, 4,5 MBB with RN IV sedation;  Surgeon: Lei Copeland MD;  Location: [facility] PAIN MGT;  Service: Pain Management;  Laterality: Right;    INJECTION OF ANESTHETIC AGENT AROUND MEDIAL BRANCH NERVES INNERVATING LUMBAR FACET JOINT Bilateral 2021    Procedure: Block-nerve-medial branch-lumbar bilateral L3, 4,5 MBB RN IV sedation;  Surgeon: Lei Copeland MD;  Location: [facility] PAIN [facility]GT;  Service: Pain Management;  Laterality: Bilateral;    INJECTION OF ANESTHETIC AGENT AROUND MEDIAL BRANCH NERVES INNERVATING LUMBAR FACET JOINT Bilateral 3/14/2022    Procedure: Block-nerve-medial branch-lumbar L3, 4 5 NO STEROIDS RN IV  sedation;  Surgeon: Lei Copeland MD;  Location: [facility] PAIN MGT;  Service: Pain Management;  Laterality: Bilateral;    INJECTION OF ANESTHETIC AGENT AROUND MEDIAL BRANCH NERVES INNERVATING LUMBAR FACET JOINT Bilateral 3/21/2022    Procedure: Block-nerve-medial branch-lumbar bilateral L3, 4,5 RN IV sedation NO STERIOD DIAGNOSTIC ONLY;  Surgeon: Lei Copeland MD;  Location: [facility] PAIN MGT;  Service: Pain Management;  Laterality: Bilateral;    LASER ABLATION N/A 2021    Procedure: ABLATION, USING LASER;  Surgeon: Don Allen MD;  Location: [facility] OR;  Service: Oncology;  Laterality: N/A;    MICRODISCECTOMY OF SPINE      OOPHORECTOMY Bilateral     RADIOFREQUENCY THERMOCOAGULATION Bilateral 2022    Procedure: RADIOFREQUENCY THERMAL COAGULATION bilateral L3, 4,5 mbb RFA RN IV sedation;  Surgeon: Lei Copeland MD;  Location: [facility] PAIN [facility]GT;  Service: Pain Management;  Laterality: Bilateral;    TONSILLECTOMY      TRIGGER FINGER RELEASE Left     TUBAL LIGATION Bilateral      Social History     Socioeconomic History    Marital status:    Tobacco Use    Smoking status: Former     Types: Cigarettes     Quit date: 2018     Years since quittin.2    Smokeless tobacco: Never   Substance and Sexual Activity    Alcohol use: Yes     Comment: socially  No alcohol 72h prior to sx    Drug use: Never    Sexual activity: Not Currently     Social Determinants of Health     Financial Resource Strain: Low Risk     Difficulty of Paying Living Expenses: Not hard at all   Food Insecurity: No Food Insecurity    Worried About Running Out of Food in the Last Year: Never true    Ran Out of Food in the Last Year: Never true   Transportation Needs: No Transportation Needs    Lack of Transportation (Medical): No    Lack of Transportation (Non-Medical): No   Physical Activity: Insufficiently Active    Days of Exercise per Week: 2 days    Minutes of Exercise per Session: 10 min   Stress: No Stress Concern Present    Feeling of  "Stress : Only a little   Social Connections: Unknown    Frequency of Communication with Friends and Family: More than three times a week    Frequency of Social Gatherings with Friends and Family: Once a week    Active Member of Clubs or Organizations: No    Attends Club or Organization Meetings: Never    Marital Status:    Housing Stability: Low Risk     Unable to Pay for Housing in the Last Year: No    Number of Places Lived in the Last Year: 1    Unstable Housing in the Last Year: No     Family History   Problem Relation Age of Onset    Malignant hypertension Mother      OB History          3    Para   2    Term   2            AB   1    Living   2         SAB        IAB        Ectopic        Multiple        Live Births                     BP (!) 144/82 (BP Location: Left arm, Patient Position: Sitting, BP Method: Medium (Manual))   Ht 5' 5" (1.651 m)   Wt 78.9 kg (174 lb 0.9 oz)   BMI 28.96 kg/m²       ROS:  GENERAL: Denies weight gain or weight loss. Feeling well overall.   SKIN: Denies rash or lesions.   HEAD: Denies head injury or headache.   NODES: Denies enlarged lymph nodes.   CHEST: Denies chest pain or shortness of breath.   CARDIOVASCULAR: Denies palpitations or left sided chest pain.   ABDOMEN: No abdominal pain, constipation, diarrhea, nausea, vomiting or rectal bleeding.   URINARY: No frequency, dysuria, hematuria, or burning on urination.  REPRODUCTIVE: See HPI.   BREASTS: The patient performs breast self-examination and denies pain, lumps, or nipple discharge.   HEMATOLOGIC: No easy bruisability or excessive bleeding.   MUSCULOSKELETAL: Denies joint pain or swelling.   NEUROLOGIC: Denies syncope or weakness.   PSYCHIATRIC: Denies depression, anxiety or mood swings.    PHYSICAL EXAM:  APPEARANCE: Well nourished, well developed, in no acute distress.  AFFECT: WNL, alert and oriented x 3  SKIN: No acne or hirsutism  NECK: Neck symmetric without masses or thyromegaly  PELVIC: both " areas well healed  See diagram of lesions  Physical Exam:               Genitourinary:                         1. Condyloma  imiquimod (ALDARA) 5 % cream      2. CELENA III (vulvar intraepithelial neoplasia III)         AND PLAN:    At this time will try aldara for the lesion that was biopsied - if does not work will have to remove  Pt will f/u in 16 weeks

## 2022-12-12 ENCOUNTER — TELEPHONE (OUTPATIENT)
Dept: ALLERGY | Facility: CLINIC | Age: 62
End: 2022-12-12
Payer: COMMERCIAL

## 2022-12-12 NOTE — TELEPHONE ENCOUNTER
Spoke with Vilma TRIPLETT pharmacist, at [facility]. Refill request approved with 11 additional refills. Still to be administered at [facility].

## 2022-12-12 NOTE — TELEPHONE ENCOUNTER
----- Message from Lesley Sofia sent at 12/12/2022  3:08 PM CST -----  Contact: [facility]/ Lizeth [phone]  Requesting an RX refill or new RX.  Is this a refill or new RX: Refill  RX name and strengthImmune Globulin G, IGG,-PRO-IGA 10 % injection, Privigen, (PRIVIGEN) 10 % Soln  Is this a 30 day or 90 day RX:   Pharmacy name and phone # [facility] - [place], [place] - [address] Phone:  [phone] Fax:  [phone]    Comments: she said they ship it to your office

## 2023-01-11 ENCOUNTER — PATIENT MESSAGE (OUTPATIENT)
Dept: ADMINISTRATIVE | Facility: HOSPITAL | Age: 63
End: 2023-01-11
Payer: COMMERCIAL

## 2023-01-11 ENCOUNTER — TELEPHONE (OUTPATIENT)
Dept: ALLERGY | Facility: CLINIC | Age: 63
End: 2023-01-11

## 2023-01-11 ENCOUNTER — OFFICE VISIT (OUTPATIENT)
Dept: ALLERGY | Facility: CLINIC | Age: 63
End: 2023-01-11
Payer: COMMERCIAL

## 2023-01-11 VITALS
DIASTOLIC BLOOD PRESSURE: 82 MMHG | BODY MASS INDEX: 28.91 KG/M2 | SYSTOLIC BLOOD PRESSURE: 133 MMHG | HEART RATE: 76 BPM | WEIGHT: 173.75 LBS | TEMPERATURE: 98 F

## 2023-01-11 DIAGNOSIS — J45.40 MODERATE PERSISTENT ASTHMA WITHOUT COMPLICATION: Primary | ICD-10-CM

## 2023-01-11 DIAGNOSIS — E83.119 HEMOCHROMATOSIS, UNSPECIFIED HEMOCHROMATOSIS TYPE: ICD-10-CM

## 2023-01-11 DIAGNOSIS — R53.83 MALAISE AND FATIGUE: ICD-10-CM

## 2023-01-11 DIAGNOSIS — R53.81 MALAISE AND FATIGUE: ICD-10-CM

## 2023-01-11 DIAGNOSIS — J40 BRONCHITIS: ICD-10-CM

## 2023-01-11 DIAGNOSIS — D83.9 CVID (COMMON VARIABLE IMMUNODEFICIENCY): ICD-10-CM

## 2023-01-11 DIAGNOSIS — R05.9 COUGH IN ADULT: ICD-10-CM

## 2023-01-11 DIAGNOSIS — Z87.891 EX-CIGARETTE SMOKER: ICD-10-CM

## 2023-01-11 DIAGNOSIS — J44.89 ASTHMA-COPD OVERLAP SYNDROME: ICD-10-CM

## 2023-01-11 DIAGNOSIS — J32.9 RECURRENT SINUSITIS: ICD-10-CM

## 2023-01-11 PROCEDURE — 1160F RVW MEDS BY RX/DR IN RCRD: CPT | Mod: CPTII,S$GLB,, | Performed by: SPECIALIST

## 2023-01-11 PROCEDURE — 3075F PR MOST RECENT SYSTOLIC BLOOD PRESS GE 130-139MM HG: ICD-10-PCS | Mod: CPTII,S$GLB,, | Performed by: SPECIALIST

## 2023-01-11 PROCEDURE — 99999 PR PBB SHADOW E&M-EST. PATIENT-LVL III: ICD-10-PCS | Mod: PBBFAC,,, | Performed by: SPECIALIST

## 2023-01-11 PROCEDURE — 1159F PR MEDICATION LIST DOCUMENTED IN MEDICAL RECORD: ICD-10-PCS | Mod: CPTII,S$GLB,, | Performed by: SPECIALIST

## 2023-01-11 PROCEDURE — 3008F PR BODY MASS INDEX (BMI) DOCUMENTED: ICD-10-PCS | Mod: CPTII,S$GLB,, | Performed by: SPECIALIST

## 2023-01-11 PROCEDURE — 1160F PR REVIEW ALL MEDS BY PRESCRIBER/CLIN PHARMACIST DOCUMENTED: ICD-10-PCS | Mod: CPTII,S$GLB,, | Performed by: SPECIALIST

## 2023-01-11 PROCEDURE — 3079F DIAST BP 80-89 MM HG: CPT | Mod: CPTII,S$GLB,, | Performed by: SPECIALIST

## 2023-01-11 PROCEDURE — 99999 PR PBB SHADOW E&M-EST. PATIENT-LVL III: CPT | Mod: PBBFAC,,, | Performed by: SPECIALIST

## 2023-01-11 PROCEDURE — 3075F SYST BP GE 130 - 139MM HG: CPT | Mod: CPTII,S$GLB,, | Performed by: SPECIALIST

## 2023-01-11 PROCEDURE — 99214 PR OFFICE/OUTPT VISIT, EST, LEVL IV, 30-39 MIN: ICD-10-PCS | Mod: S$GLB,,, | Performed by: SPECIALIST

## 2023-01-11 PROCEDURE — 99214 OFFICE O/P EST MOD 30 MIN: CPT | Mod: S$GLB,,, | Performed by: SPECIALIST

## 2023-01-11 PROCEDURE — 3079F PR MOST RECENT DIASTOLIC BLOOD PRESSURE 80-89 MM HG: ICD-10-PCS | Mod: CPTII,S$GLB,, | Performed by: SPECIALIST

## 2023-01-11 PROCEDURE — 3008F BODY MASS INDEX DOCD: CPT | Mod: CPTII,S$GLB,, | Performed by: SPECIALIST

## 2023-01-11 PROCEDURE — 1159F MED LIST DOCD IN RCRD: CPT | Mod: CPTII,S$GLB,, | Performed by: SPECIALIST

## 2023-01-11 RX ORDER — ALBUTEROL SULFATE 90 UG/1
2 AEROSOL, METERED RESPIRATORY (INHALATION) EVERY 6 HOURS PRN
Qty: 18 G | Refills: 5 | Status: SHIPPED | OUTPATIENT
Start: 2023-01-11 | End: 2024-01-30

## 2023-01-11 NOTE — TELEPHONE ENCOUNTER
Spoke with Bharti at [facility] who wanted to confirm that pts IVIG orders should still stand for 3 months. I confirmed that that yes, they should stand for 3 months.

## 2023-01-11 NOTE — TELEPHONE ENCOUNTER
----- Message from Ladi Wells sent at 1/11/2023  4:05 PM CST -----  Contact: leonardo/ [facility]  Leonardo with [facility] is calling to speak with the nurse regarding orders. Reports was calling about the patients IVIG orders. Please give leonardo a call back at [phone]  Thanks leatha

## 2023-01-12 ENCOUNTER — PATIENT MESSAGE (OUTPATIENT)
Dept: ALLERGY | Facility: CLINIC | Age: 63
End: 2023-01-12
Payer: COMMERCIAL

## 2023-01-24 ENCOUNTER — OFFICE VISIT (OUTPATIENT)
Dept: INTERNAL MEDICINE | Facility: CLINIC | Age: 63
End: 2023-01-24
Payer: COMMERCIAL

## 2023-01-24 VITALS
HEIGHT: 65 IN | SYSTOLIC BLOOD PRESSURE: 136 MMHG | HEART RATE: 65 BPM | OXYGEN SATURATION: 97 % | BODY MASS INDEX: 29.09 KG/M2 | TEMPERATURE: 98 F | WEIGHT: 174.63 LBS | DIASTOLIC BLOOD PRESSURE: 84 MMHG

## 2023-01-24 DIAGNOSIS — I70.0 ABDOMINAL AORTIC ATHEROSCLEROSIS: ICD-10-CM

## 2023-01-24 DIAGNOSIS — R91.1 SOLITARY PULMONARY NODULE: ICD-10-CM

## 2023-01-24 DIAGNOSIS — I25.10 CORONARY ARTERY DISEASE INVOLVING NATIVE CORONARY ARTERY OF NATIVE HEART WITHOUT ANGINA PECTORIS: ICD-10-CM

## 2023-01-24 DIAGNOSIS — Z12.2 SCREENING FOR LUNG CANCER: ICD-10-CM

## 2023-01-24 DIAGNOSIS — R73.03 PREDIABETES: ICD-10-CM

## 2023-01-24 DIAGNOSIS — Z23 NEED FOR TDAP VACCINATION: ICD-10-CM

## 2023-01-24 DIAGNOSIS — Z87.891 FORMER SMOKER: Primary | ICD-10-CM

## 2023-01-24 PROBLEM — J32.9 RECURRENT SINUSITIS: Status: RESOLVED | Noted: 2021-02-04 | Resolved: 2023-01-24

## 2023-01-24 PROBLEM — R05.9 COUGH IN ADULT PATIENT: Status: RESOLVED | Noted: 2022-02-03 | Resolved: 2023-01-24

## 2023-01-24 PROBLEM — R05.9 COUGH IN ADULT: Status: RESOLVED | Noted: 2022-05-04 | Resolved: 2023-01-24

## 2023-01-24 PROBLEM — J40 BRONCHITIS: Status: RESOLVED | Noted: 2021-02-04 | Resolved: 2023-01-24

## 2023-01-24 PROBLEM — R53.81 MALAISE: Status: RESOLVED | Noted: 2022-05-04 | Resolved: 2023-01-24

## 2023-01-24 PROCEDURE — 99999 PR PBB SHADOW E&M-EST. PATIENT-LVL IV: CPT | Mod: PBBFAC,,, | Performed by: FAMILY MEDICINE

## 2023-01-24 PROCEDURE — 1159F MED LIST DOCD IN RCRD: CPT | Mod: CPTII,S$GLB,, | Performed by: FAMILY MEDICINE

## 2023-01-24 PROCEDURE — 1159F PR MEDICATION LIST DOCUMENTED IN MEDICAL RECORD: ICD-10-PCS | Mod: CPTII,S$GLB,, | Performed by: FAMILY MEDICINE

## 2023-01-24 PROCEDURE — 99999 PR PBB SHADOW E&M-EST. PATIENT-LVL IV: ICD-10-PCS | Mod: PBBFAC,,, | Performed by: FAMILY MEDICINE

## 2023-01-24 PROCEDURE — 99215 PR OFFICE/OUTPT VISIT, EST, LEVL V, 40-54 MIN: ICD-10-PCS | Mod: 25,S$GLB,, | Performed by: FAMILY MEDICINE

## 2023-01-24 PROCEDURE — 90471 TDAP VACCINE GREATER THAN OR EQUAL TO 7YO IM: ICD-10-PCS | Mod: S$GLB,,, | Performed by: FAMILY MEDICINE

## 2023-01-24 PROCEDURE — 3075F PR MOST RECENT SYSTOLIC BLOOD PRESS GE 130-139MM HG: ICD-10-PCS | Mod: CPTII,S$GLB,, | Performed by: FAMILY MEDICINE

## 2023-01-24 PROCEDURE — 90471 IMMUNIZATION ADMIN: CPT | Mod: S$GLB,,, | Performed by: FAMILY MEDICINE

## 2023-01-24 PROCEDURE — 99215 OFFICE O/P EST HI 40 MIN: CPT | Mod: 25,S$GLB,, | Performed by: FAMILY MEDICINE

## 2023-01-24 PROCEDURE — 3008F PR BODY MASS INDEX (BMI) DOCUMENTED: ICD-10-PCS | Mod: CPTII,S$GLB,, | Performed by: FAMILY MEDICINE

## 2023-01-24 PROCEDURE — 3075F SYST BP GE 130 - 139MM HG: CPT | Mod: CPTII,S$GLB,, | Performed by: FAMILY MEDICINE

## 2023-01-24 PROCEDURE — 3008F BODY MASS INDEX DOCD: CPT | Mod: CPTII,S$GLB,, | Performed by: FAMILY MEDICINE

## 2023-01-24 PROCEDURE — 90715 TDAP VACCINE GREATER THAN OR EQUAL TO 7YO IM: ICD-10-PCS | Mod: S$GLB,,, | Performed by: FAMILY MEDICINE

## 2023-01-24 PROCEDURE — 3079F DIAST BP 80-89 MM HG: CPT | Mod: CPTII,S$GLB,, | Performed by: FAMILY MEDICINE

## 2023-01-24 PROCEDURE — 90715 TDAP VACCINE 7 YRS/> IM: CPT | Mod: S$GLB,,, | Performed by: FAMILY MEDICINE

## 2023-01-24 PROCEDURE — 3079F PR MOST RECENT DIASTOLIC BLOOD PRESSURE 80-89 MM HG: ICD-10-PCS | Mod: CPTII,S$GLB,, | Performed by: FAMILY MEDICINE

## 2023-01-24 NOTE — PROGRESS NOTES
Subjective:   Patient ID: María Julian is a 62 y.o. female.  Chief Complaint:  Follow-up    Presents to establish care    Previous PCP Dr. Layton  Also followed by   External GI for hemochromatosis  Internal immunology for immunodeficiency   Internal Cardiology for hypertension  Internal pulmonology for COPD/asthma  Internal interventional pain management for chronic low back pain  Internal Dermatology  Internal OBGYN    Last annual physical exam April 2022   CBC, CMP, lipids, TSH all acceptable   A1c 5.7% and elevated in prediabetic range  No current medication for prediabetes  Denies symptoms hypo hyperglycemia   Concerned because of inability to lose weight    Scheduled appointment at suggestion of immunologist to arrange annual low-dose CT lung cancer screening   Greater than 50 years old, quit smoking less than 15 years ago  Greater than 20 pack year history  Last screening March 2021   Emphysematous changes with left lower lobe nonsuspicious appearing nodule and incidental aortic and coronary atherosclerosis  Check a no current treatment for atherosclerosis  Appears to refuse statins in past    Health maintenance needs include:  - Tetanus booster, shingles vaccines, and Prevnar 20 vaccine   - Colorectal screening, but reports scheduled November 2023 with Dr. Garza    Review of Systems   Constitutional:  Positive for unexpected weight change. Negative for activity change, diaphoresis, fatigue and fever.   HENT:  Negative for hearing loss, rhinorrhea and trouble swallowing.    Eyes:  Negative for discharge and visual disturbance.   Respiratory:  Negative for chest tightness, shortness of breath and wheezing.    Cardiovascular:  Negative for chest pain, palpitations and leg swelling.   Gastrointestinal:  Negative for abdominal pain, blood in stool, constipation, diarrhea, nausea and vomiting.   Endocrine: Negative for polydipsia, polyphagia and polyuria.   Genitourinary:  Negative for difficulty  "urinating, dysuria, flank pain, frequency, hematuria, menstrual problem and urgency.   Musculoskeletal:  Positive for arthralgias. Negative for joint swelling, myalgias and neck pain.   Skin:  Negative for rash.   Neurological:  Negative for dizziness, weakness, light-headedness, numbness and headaches.   Psychiatric/Behavioral:  Negative for confusion and dysphoric mood.      Objective:   /84 (BP Location: Right arm, Patient Position: Sitting, BP Method: Large (Manual))   Pulse 65   Temp 97.5 °F (36.4 °C) (Tympanic)   Ht 5' 5" (1.651 m)   Wt 79.2 kg (174 lb 9.7 oz)   SpO2 97%   BMI 29.06 kg/m²     Physical Exam  Vitals and nursing note reviewed.   Constitutional:       Appearance: Normal appearance. She is well-developed and overweight.   Cardiovascular:      Rate and Rhythm: Normal rate and regular rhythm.   Pulmonary:      Effort: Pulmonary effort is normal.      Breath sounds: Normal breath sounds. No decreased breath sounds, wheezing, rhonchi or rales.   Musculoskeletal:      Right lower leg: No edema.      Left lower leg: No edema.   Skin:     General: Skin is warm and dry.      Findings: No rash.   Neurological:      Mental Status: She is alert.   Psychiatric:         Attention and Perception: Attention normal.         Mood and Affect: Mood and affect normal.       Assessment:       ICD-10-CM ICD-9-CM   1. Former smoker  Z87.891 V15.82   2. Solitary pulmonary nodule  R91.1 793.11   3. Screening for lung cancer  Z12.2 V76.0   4. Prediabetes  R73.03 790.29   5. Abdominal aortic atherosclerosis  I70.0 440.0   6. Coronary artery disease involving native coronary artery of native heart without angina pectoris  I25.10 414.01   7. Need for Tdap vaccination  Z23 V06.1     Plan:   Former smoker  Solitary pulmonary nodule  Screening for lung cancer  -     CT Chest Lung Screening Low Dose; Future; Expected date: 01/24/2023    Prediabetes  Stable.  Asymptomatic.    Remain off medications for now   Repeat A1c " at annual physical exam  Treat as indicated     Abdominal aortic atherosclerosis  Coronary artery disease involving native coronary artery of native heart without angina pectoris  Stable.  Asymptomatic.    Refuses statin  Needs to discuss with cardiologist potential treatment with Rapatha    RHM  -     (In Office Administered) Tdap Vaccine  Tetanus booster today  Discussed with immunology Prevnar 20 vaccine and shingles vaccine  Obtain colonoscopy as scheduled November 2023    Follow-up with any/all specialists as scheduled    Return to clinic 3 months for annual physical exam    40+ minutes of total time spent on the encounter, which includes face to face time and non-face to face time preparing to see the patient (eg, review of tests), Obtaining and/or reviewing separately obtained history, documenting clinical information in the electronic or other health record, independently interpreting results (not separately reported) and communicating results to the patient/family/caregiver, or Care coordination (not separately reported).   At the

## 2023-02-02 ENCOUNTER — HOSPITAL ENCOUNTER (OUTPATIENT)
Dept: RADIOLOGY | Facility: HOSPITAL | Age: 63
Discharge: HOME | End: 2023-02-02
Attending: FAMILY MEDICINE
Payer: COMMERCIAL

## 2023-02-02 DIAGNOSIS — Z87.891 FORMER SMOKER: ICD-10-CM

## 2023-02-02 DIAGNOSIS — R91.1 SOLITARY PULMONARY NODULE: ICD-10-CM

## 2023-02-02 DIAGNOSIS — Z12.2 SCREENING FOR LUNG CANCER: ICD-10-CM

## 2023-02-02 PROCEDURE — 71271 CT THORAX LUNG CANCER SCR C-: CPT | Mod: TC

## 2023-02-02 PROCEDURE — 71271 CT THORAX LUNG CANCER SCR C-: CPT | Mod: 26,,, | Performed by: RADIOLOGY

## 2023-02-02 PROCEDURE — 71271 CT CHEST LUNG SCREENING LOW DOSE: ICD-10-PCS | Mod: 26,,, | Performed by: RADIOLOGY

## 2023-02-06 ENCOUNTER — TELEPHONE (OUTPATIENT)
Dept: ALLERGY | Facility: CLINIC | Age: 63
End: 2023-02-06
Payer: COMMERCIAL

## 2023-02-06 NOTE — TELEPHONE ENCOUNTER
----- Message from Teofilo Carter sent at 2/6/2023  1:29 PM CST -----  Contact: Mira RENEE is requesting a call in regards to getting the mailing address for pt medication. Please call her back at [phone] option 1. Thanks KB

## 2023-03-12 ENCOUNTER — PATIENT MESSAGE (OUTPATIENT)
Dept: INTERNAL MEDICINE | Facility: CLINIC | Age: 63
End: 2023-03-12
Payer: COMMERCIAL

## 2023-03-13 ENCOUNTER — PATIENT MESSAGE (OUTPATIENT)
Dept: PAIN MEDICINE | Facility: CLINIC | Age: 63
End: 2023-03-13
Payer: COMMERCIAL

## 2023-04-04 ENCOUNTER — OFFICE VISIT (OUTPATIENT)
Dept: OBSTETRICS AND GYNECOLOGY | Facility: CLINIC | Age: 63
End: 2023-04-04
Payer: COMMERCIAL

## 2023-04-04 VITALS
DIASTOLIC BLOOD PRESSURE: 82 MMHG | BODY MASS INDEX: 27.58 KG/M2 | WEIGHT: 165.56 LBS | HEIGHT: 65 IN | SYSTOLIC BLOOD PRESSURE: 128 MMHG

## 2023-04-04 DIAGNOSIS — A63.0 CONDYLOMA: Primary | ICD-10-CM

## 2023-04-04 DIAGNOSIS — B37.31 YEAST VAGINITIS: ICD-10-CM

## 2023-04-04 DIAGNOSIS — D07.1 VIN III (VULVAR INTRAEPITHELIAL NEOPLASIA III): ICD-10-CM

## 2023-04-04 PROCEDURE — 1159F MED LIST DOCD IN RCRD: CPT | Mod: CPTII,S$GLB,, | Performed by: NURSE PRACTITIONER

## 2023-04-04 PROCEDURE — 3079F PR MOST RECENT DIASTOLIC BLOOD PRESSURE 80-89 MM HG: ICD-10-PCS | Mod: CPTII,S$GLB,, | Performed by: NURSE PRACTITIONER

## 2023-04-04 PROCEDURE — 1160F RVW MEDS BY RX/DR IN RCRD: CPT | Mod: CPTII,S$GLB,, | Performed by: NURSE PRACTITIONER

## 2023-04-04 PROCEDURE — 99213 OFFICE O/P EST LOW 20 MIN: CPT | Mod: S$GLB,,, | Performed by: NURSE PRACTITIONER

## 2023-04-04 PROCEDURE — 1160F PR REVIEW ALL MEDS BY PRESCRIBER/CLIN PHARMACIST DOCUMENTED: ICD-10-PCS | Mod: CPTII,S$GLB,, | Performed by: NURSE PRACTITIONER

## 2023-04-04 PROCEDURE — 99999 PR PBB SHADOW E&M-EST. PATIENT-LVL III: ICD-10-PCS | Mod: PBBFAC,,, | Performed by: NURSE PRACTITIONER

## 2023-04-04 PROCEDURE — 3008F PR BODY MASS INDEX (BMI) DOCUMENTED: ICD-10-PCS | Mod: CPTII,S$GLB,, | Performed by: NURSE PRACTITIONER

## 2023-04-04 PROCEDURE — 3074F PR MOST RECENT SYSTOLIC BLOOD PRESSURE < 130 MM HG: ICD-10-PCS | Mod: CPTII,S$GLB,, | Performed by: NURSE PRACTITIONER

## 2023-04-04 PROCEDURE — 1159F PR MEDICATION LIST DOCUMENTED IN MEDICAL RECORD: ICD-10-PCS | Mod: CPTII,S$GLB,, | Performed by: NURSE PRACTITIONER

## 2023-04-04 PROCEDURE — 99999 PR PBB SHADOW E&M-EST. PATIENT-LVL III: CPT | Mod: PBBFAC,,, | Performed by: NURSE PRACTITIONER

## 2023-04-04 PROCEDURE — 99213 PR OFFICE/OUTPT VISIT, EST, LEVL III, 20-29 MIN: ICD-10-PCS | Mod: S$GLB,,, | Performed by: NURSE PRACTITIONER

## 2023-04-04 PROCEDURE — 3079F DIAST BP 80-89 MM HG: CPT | Mod: CPTII,S$GLB,, | Performed by: NURSE PRACTITIONER

## 2023-04-04 PROCEDURE — 3008F BODY MASS INDEX DOCD: CPT | Mod: CPTII,S$GLB,, | Performed by: NURSE PRACTITIONER

## 2023-04-04 PROCEDURE — 3074F SYST BP LT 130 MM HG: CPT | Mod: CPTII,S$GLB,, | Performed by: NURSE PRACTITIONER

## 2023-04-04 RX ORDER — FLUCONAZOLE 150 MG/1
TABLET ORAL
Qty: 2 TABLET | Refills: 1 | Status: SHIPPED | OUTPATIENT
Start: 2023-04-04 | End: 2024-01-25

## 2023-04-04 RX ORDER — METFORMIN HYDROCHLORIDE 500 MG/1
500 TABLET ORAL
COMMUNITY
Start: 2023-02-20 | End: 2023-04-24

## 2023-04-04 NOTE — PROGRESS NOTES
María Julian is a 63 y.o. female  presents for a recheck of condyloma from biopsy in 10/22 - recheck post biospy was  - one area remained with some condyloma changes - from lesion #2  and Aldara was started.  This is her first recheck since starting aldara.    Past Medical History:   Diagnosis Date    Abnormal Pap smear of cervix     Allergy     Asthma     COPD (chronic obstructive pulmonary disease)     Malignant hyperthermia     pt's mother has hx of MH.  Pt denies any complications with general anesthesia    Recurrent upper respiratory infection (URI)      Past Surgical History:   Procedure Laterality Date    BONE SPUR EXCISION SHOULDER Right     CARPAL TUNNEL RELEASE Left      SECTION      x 2    CHOLECYSTECTOMY  2018    DILATION AND CURETTAGE OF UTERUS      EXAMINATION UNDER ANESTHESIA N/A 2021    Procedure: Exam Under Anesthesia;  Surgeon: Don Allen MD;  Location: [facility] OR;  Service: Oncology;  Laterality: N/A;    FOOT TENDON SURGERY Bilateral     HYSTERECTOMY      fibroid    INJECTION OF ANESTHETIC AGENT AROUND MEDIAL BRANCH NERVES INNERVATING LUMBAR FACET JOINT Right 10/28/2021    Procedure: Block-nerve-medial branch-lumbar Right L3, 4,5 MBB with RN IV sedation;  Surgeon: Lei Copeland MD;  Location: [facility]IN [facility]GT;  Service: Pain Management;  Laterality: Right;    INJECTION OF ANESTHETIC AGENT AROUND MEDIAL BRANCH NERVES INNERVATING LUMBAR FACET JOINT Bilateral 2021    Procedure: Block-nerve-medial branch-lumbar bilateral L3, 4,5 MBB RN IV sedation;  Surgeon: Lei Copeland MD;  Location: [facility];  Service: Pain Management;  Laterality: Bilateral;    INJECTION OF ANESTHETIC AGENT AROUND MEDIAL BRANCH NERVES INNERVATING LUMBAR FACET JOINT Bilateral 3/14/2022    Procedure: Block-nerve-medial branch-lumbar L3, 4 5 NO STEROIDS RN IV sedation;  Surgeon: Lei Copeland MD;  Location: [facility]IN [facility]GT;  Service: Pain Management;  Laterality: Bilateral;    INJECTION  "OF ANESTHETIC AGENT AROUND MEDIAL BRANCH NERVES INNERVATING LUMBAR FACET JOINT Bilateral 3/21/2022    Procedure: Block-nerve-medial branch-lumbar bilateral L3, 4,5 RN IV sedation NO STERIOD DIAGNOSTIC ONLY;  Surgeon: Lei Copeland MD;  Location: [facility];  Service: Pain Management;  Laterality: Bilateral;    LASER ABLATION N/A 2021    Procedure: ABLATION, USING LASER;  Surgeon: Don Allen MD;  Location: [facility] OR;  Service: Oncology;  Laterality: N/A;    MICRODISCECTOMY OF SPINE      OOPHORECTOMY Bilateral     RADIOFREQUENCY THERMOCOAGULATION Bilateral 2022    Procedure: RADIOFREQUENCY THERMAL COAGULATION bilateral L3, 4,5 mbb RFA RN IV sedation;  Surgeon: Lei Copeland MD;  Location: [facility];  Service: Pain Management;  Laterality: Bilateral;    TONSILLECTOMY      TRIGGER FINGER RELEASE Left     TUBAL LIGATION Bilateral      Family History   Problem Relation Age of Onset    Malignant hypertension Mother      Social History     Tobacco Use    Smoking status: Former     Types: Cigarettes     Quit date: 2018     Years since quittin.5    Smokeless tobacco: Never   Substance Use Topics    Alcohol use: Yes     Comment: socially  No alcohol 72h prior to sx    Drug use: Never     OB History          3    Para   2    Term   2            AB   1    Living   2         SAB        IAB        Ectopic        Multiple        Live Births                     /82 (BP Location: Left arm, Patient Position: Sitting, BP Method: Medium (Manual))   Ht 5' 5" (1.651 m)   Wt 75.1 kg (165 lb 9.1 oz)   BMI 27.55 kg/m²     ROS:  GENERAL: Denies weight gain or weight loss. Feeling well overall.   SKIN: Denies rash or lesions.   HEAD: Denies head injury or headache.   NODES: Denies enlarged lymph nodes.   CHEST: Denies chest pain or shortness of breath.   CARDIOVASCULAR: Denies palpitations or left sided chest pain.   ABDOMEN: No abdominal pain, constipation, diarrhea, nausea, vomiting or " rectal bleeding.   URINARY: No frequency, dysuria, hematuria, or burning on urination.  REPRODUCTIVE: See HPI.   BREASTS: The patient performs breast self-examination and denies pain, lumps, or nipple discharge.   HEMATOLOGIC: No easy bruisability or excessive bleeding.   MUSCULOSKELETAL: Denies joint pain or swelling.   NEUROLOGIC: Denies syncope or weakness.   PSYCHIATRIC: Denies depression, anxiety or mood swings.    PE:   APPEARANCE: Well nourished, well developed, in no acute distress.  AFFECT: WNL, alert and oriented x 3.  SKIN: No acne or hirsutism.  PELVIC: area of biopsy and use of aldra has cleared, recommend at this time for pt to stop aldara  She has some redness to skin down into perineum which appears yeast in nature   Physical Exam     1. Condyloma        2. CELENA III (vulvar intraepithelial neoplasia III)        3. Yeast vaginitis  fluconazole (DIFLUCAN) 150 MG Tab       and PLAN:  María was seen today for follow-up.    Diagnoses and all orders for this visit:    Condyloma    CELENA III (vulvar intraepithelial neoplasia III)    Yeast vaginitis  -     fluconazole (DIFLUCAN) 150 MG Tab; Take one tablet and repeat dose in 3 days     See back in October and will do annual exam and check area of any future lesions   Patient was counseled today on A.C.S. Pap guidelines and recommendations for yearly pelvic exams, mammograms and monthly self breast exams; to see her PCP for other health maintenance.

## 2023-04-19 DIAGNOSIS — I10 ESSENTIAL HYPERTENSION: ICD-10-CM

## 2023-04-24 ENCOUNTER — OFFICE VISIT (OUTPATIENT)
Dept: INTERNAL MEDICINE | Facility: CLINIC | Age: 63
End: 2023-04-24
Payer: COMMERCIAL

## 2023-04-24 ENCOUNTER — LAB VISIT (OUTPATIENT)
Dept: LAB | Facility: HOSPITAL | Age: 63
End: 2023-04-24
Attending: FAMILY MEDICINE
Payer: COMMERCIAL

## 2023-04-24 VITALS
BODY MASS INDEX: 27.15 KG/M2 | WEIGHT: 162.94 LBS | SYSTOLIC BLOOD PRESSURE: 130 MMHG | HEART RATE: 77 BPM | HEIGHT: 65 IN | OXYGEN SATURATION: 99 % | DIASTOLIC BLOOD PRESSURE: 80 MMHG | TEMPERATURE: 97 F

## 2023-04-24 DIAGNOSIS — Z12.31 ENCOUNTER FOR SCREENING MAMMOGRAM FOR MALIGNANT NEOPLASM OF BREAST: ICD-10-CM

## 2023-04-24 DIAGNOSIS — Z00.00 ANNUAL PHYSICAL EXAM: ICD-10-CM

## 2023-04-24 DIAGNOSIS — R73.03 PREDIABETES: ICD-10-CM

## 2023-04-24 DIAGNOSIS — Z00.00 ANNUAL PHYSICAL EXAM: Primary | ICD-10-CM

## 2023-04-24 LAB
ALBUMIN SERPL BCP-MCNC: 4.4 G/DL (ref 3.5–5.2)
ALP SERPL-CCNC: 76 U/L (ref 55–135)
ALT SERPL W/O P-5'-P-CCNC: 9 U/L (ref 10–44)
ANION GAP SERPL CALC-SCNC: 12 MMOL/L (ref 8–16)
AST SERPL-CCNC: 17 U/L (ref 10–40)
BILIRUB SERPL-MCNC: 0.7 MG/DL (ref 0.1–1)
BUN SERPL-MCNC: 11 MG/DL (ref 8–23)
CALCIUM SERPL-MCNC: 10.1 MG/DL (ref 8.7–10.5)
CHLORIDE SERPL-SCNC: 99 MMOL/L (ref 95–110)
CHOLEST SERPL-MCNC: 160 MG/DL (ref 120–199)
CHOLEST/HDLC SERPL: 3.1 {RATIO} (ref 2–5)
CO2 SERPL-SCNC: 23 MMOL/L (ref 23–29)
CREAT SERPL-MCNC: 0.8 MG/DL (ref 0.5–1.4)
ERYTHROCYTE [DISTWIDTH] IN BLOOD BY AUTOMATED COUNT: 11.8 % (ref 11.5–14.5)
EST. GFR  (NO RACE VARIABLE): >60 ML/MIN/1.73 M^2
ESTIMATED AVG GLUCOSE: 88 MG/DL (ref 68–131)
GLUCOSE SERPL-MCNC: 80 MG/DL (ref 70–110)
HBA1C MFR BLD: 4.7 % (ref 4–5.6)
HCT VFR BLD AUTO: 44.1 % (ref 37–48.5)
HDLC SERPL-MCNC: 52 MG/DL (ref 40–75)
HDLC SERPL: 32.5 % (ref 20–50)
HGB BLD-MCNC: 15.4 G/DL (ref 12–16)
LDLC SERPL CALC-MCNC: 93.8 MG/DL (ref 63–159)
MCH RBC QN AUTO: 35.9 PG (ref 27–31)
MCHC RBC AUTO-ENTMCNC: 34.9 G/DL (ref 32–36)
MCV RBC AUTO: 103 FL (ref 82–98)
NONHDLC SERPL-MCNC: 108 MG/DL
PLATELET # BLD AUTO: 247 K/UL (ref 150–450)
PMV BLD AUTO: 10.4 FL (ref 9.2–12.9)
POTASSIUM SERPL-SCNC: 3.4 MMOL/L (ref 3.5–5.1)
PROT SERPL-MCNC: 7.9 G/DL (ref 6–8.4)
RBC # BLD AUTO: 4.29 M/UL (ref 4–5.4)
SODIUM SERPL-SCNC: 134 MMOL/L (ref 136–145)
TRIGL SERPL-MCNC: 71 MG/DL (ref 30–150)
WBC # BLD AUTO: 5.22 K/UL (ref 3.9–12.7)

## 2023-04-24 PROCEDURE — 3008F PR BODY MASS INDEX (BMI) DOCUMENTED: ICD-10-PCS | Mod: CPTII,S$GLB,, | Performed by: FAMILY MEDICINE

## 2023-04-24 PROCEDURE — 99999 PR PBB SHADOW E&M-EST. PATIENT-LVL III: CPT | Mod: PBBFAC,,, | Performed by: FAMILY MEDICINE

## 2023-04-24 PROCEDURE — 83036 HEMOGLOBIN GLYCOSYLATED A1C: CPT | Performed by: FAMILY MEDICINE

## 2023-04-24 PROCEDURE — 84443 ASSAY THYROID STIM HORMONE: CPT | Performed by: FAMILY MEDICINE

## 2023-04-24 PROCEDURE — 1159F PR MEDICATION LIST DOCUMENTED IN MEDICAL RECORD: ICD-10-PCS | Mod: CPTII,S$GLB,, | Performed by: FAMILY MEDICINE

## 2023-04-24 PROCEDURE — 36415 COLL VENOUS BLD VENIPUNCTURE: CPT | Performed by: FAMILY MEDICINE

## 2023-04-24 PROCEDURE — 1159F MED LIST DOCD IN RCRD: CPT | Mod: CPTII,S$GLB,, | Performed by: FAMILY MEDICINE

## 2023-04-24 PROCEDURE — 99999 PR PBB SHADOW E&M-EST. PATIENT-LVL III: ICD-10-PCS | Mod: PBBFAC,,, | Performed by: FAMILY MEDICINE

## 2023-04-24 PROCEDURE — 80061 LIPID PANEL: CPT | Performed by: FAMILY MEDICINE

## 2023-04-24 PROCEDURE — 3079F DIAST BP 80-89 MM HG: CPT | Mod: CPTII,S$GLB,, | Performed by: FAMILY MEDICINE

## 2023-04-24 PROCEDURE — 3075F SYST BP GE 130 - 139MM HG: CPT | Mod: CPTII,S$GLB,, | Performed by: FAMILY MEDICINE

## 2023-04-24 PROCEDURE — 3008F BODY MASS INDEX DOCD: CPT | Mod: CPTII,S$GLB,, | Performed by: FAMILY MEDICINE

## 2023-04-24 PROCEDURE — 3079F PR MOST RECENT DIASTOLIC BLOOD PRESSURE 80-89 MM HG: ICD-10-PCS | Mod: CPTII,S$GLB,, | Performed by: FAMILY MEDICINE

## 2023-04-24 PROCEDURE — 80053 COMPREHEN METABOLIC PANEL: CPT | Performed by: FAMILY MEDICINE

## 2023-04-24 PROCEDURE — 85027 COMPLETE CBC AUTOMATED: CPT | Performed by: FAMILY MEDICINE

## 2023-04-24 PROCEDURE — 1160F PR REVIEW ALL MEDS BY PRESCRIBER/CLIN PHARMACIST DOCUMENTED: ICD-10-PCS | Mod: CPTII,S$GLB,, | Performed by: FAMILY MEDICINE

## 2023-04-24 PROCEDURE — 3075F PR MOST RECENT SYSTOLIC BLOOD PRESS GE 130-139MM HG: ICD-10-PCS | Mod: CPTII,S$GLB,, | Performed by: FAMILY MEDICINE

## 2023-04-24 PROCEDURE — 99396 PREV VISIT EST AGE 40-64: CPT | Mod: S$GLB,,, | Performed by: FAMILY MEDICINE

## 2023-04-24 PROCEDURE — 1160F RVW MEDS BY RX/DR IN RCRD: CPT | Mod: CPTII,S$GLB,, | Performed by: FAMILY MEDICINE

## 2023-04-24 PROCEDURE — 99396 PR PREVENTIVE VISIT,EST,40-64: ICD-10-PCS | Mod: S$GLB,,, | Performed by: FAMILY MEDICINE

## 2023-04-24 RX ORDER — METFORMIN HYDROCHLORIDE 500 MG/1
500 TABLET ORAL
Qty: 90 TABLET | Refills: 3 | Status: SHIPPED | OUTPATIENT
Start: 2023-04-24 | End: 2024-04-23

## 2023-04-24 NOTE — PROGRESS NOTES
Subjective:   Patient ID: María Julian is a 63 y.o. female.  Chief Complaint:  Annual Exam    Presents for annual physical exam  Also followed by   External GI for hemochromatosis  Internal immunology for immunodeficiency   Internal Cardiology for hypertension  Internal pulmonology for COPD/asthma  Internal interventional pain management for chronic low back pain  Internal Dermatology  Internal OBGYN    Last visit January 2023  Ordered low-dose CT lung cancer screening   Results showed no significant lung findings related to lung cancer and aortic and coronary atherosclerosis for which patient has refused statin in the past     Last annual physical exam April 2022   CBC, CMP, lipids, TSH all acceptable   A1c 5.7% and elevated in prediabetic range  No current medication for prediabetes  Denies symptoms hypo hyperglycemia   Concerned because of inability to lose weight  Recently started on metformin 500 mg daily by external weight loss clinic, but she would like refilled here.    Health maintenance needs include:  - Shingles vaccines and Prevnar 20 vaccine  - Mammogram    No new complaints or concerns today    Review of Systems   Constitutional:  Negative for activity change, chills, diaphoresis, fatigue and fever.   HENT:  Negative for congestion, dental problem, drooling, ear pain, postnasal drip, rhinorrhea, sinus pressure, sneezing, sore throat and voice change.    Eyes:  Negative for discharge and visual disturbance.   Respiratory:  Negative for apnea, cough, choking, chest tightness, shortness of breath, wheezing and stridor.    Cardiovascular:  Negative for chest pain, palpitations and leg swelling.   Gastrointestinal:  Negative for abdominal distention, abdominal pain, blood in stool, constipation, diarrhea, nausea and vomiting.   Endocrine: Negative for polydipsia, polyphagia and polyuria.   Genitourinary:  Negative for difficulty urinating, dysuria, flank pain, frequency, hematuria, pelvic pain  "and urgency.   Musculoskeletal:  Negative for gait problem and myalgias.   Skin:  Negative for color change and rash.   Neurological:  Negative for dizziness, syncope, weakness, light-headedness, numbness and headaches.   Hematological:  Negative for adenopathy.   Psychiatric/Behavioral:  Negative for decreased concentration, dysphoric mood and sleep disturbance. The patient is not nervous/anxious.      Objective:   /80 (BP Location: Left arm, Patient Position: Sitting, BP Method: Small (Manual))   Pulse 77   Temp 96.8 °F (36 °C) (Tympanic)   Ht 5' 5" (1.651 m)   Wt 73.9 kg (162 lb 14.7 oz)   SpO2 99%   BMI 27.11 kg/m²     Physical Exam  Vitals and nursing note reviewed.   Constitutional:       Appearance: Normal appearance. She is well-developed and overweight.   Neck:      Thyroid: No thyroid mass, thyromegaly or thyroid tenderness.   Cardiovascular:      Rate and Rhythm: Normal rate and regular rhythm.      Heart sounds: Normal heart sounds. No murmur heard.    No friction rub. No gallop.   Pulmonary:      Effort: Pulmonary effort is normal.      Breath sounds: Normal breath sounds. No decreased breath sounds, wheezing, rhonchi or rales.   Abdominal:      General: There is no distension.      Palpations: Abdomen is soft.      Tenderness: There is no abdominal tenderness.   Musculoskeletal:      Right lower leg: No edema.      Left lower leg: No edema.   Lymphadenopathy:      Cervical: No cervical adenopathy.   Skin:     Findings: No rash.   Neurological:      General: No focal deficit present.      Mental Status: She is alert and oriented to person, place, and time.   Psychiatric:         Mood and Affect: Mood and affect normal.     Assessment:       ICD-10-CM ICD-9-CM   1. Annual physical exam  Z00.00 V70.0   2. Encounter for screening mammogram for malignant neoplasm of breast  Z12.31 V76.12   3. Prediabetes  R73.03 790.29     Plan:   Annual physical exam  Encounter for screening mammogram for " malignant neoplasm of breast  -     CBC Without Differential; Future; Expected date: 04/24/2023  -     Comprehensive Metabolic Panel; Future; Expected date: 04/24/2023  -     Lipid Panel; Future; Expected date: 04/24/2023  -     TSH; Future; Expected date: 04/24/2023  -     Hemoglobin A1C; Future; Expected date: 04/24/2023  -     Mammo Digital Screening Bilat w/ Earl; Future; Expected date: 04/24/2023  Blood pressure normal.  BMI 27.  Overall exam stable.    Check labs.  Treat as indicated.  Colon cancer screening up-to-date  Gyn exam up-to-date  Mammogram ordered  Plans to discuss shingles and pneumonia vaccine with her immunologist  Declines discuss potential treatment with Repatha with her cardiologist    Prediabetes  -     metFORMIN (GLUCOPHAGE) 500 MG tablet; Take 1 tablet (500 mg total) by mouth daily with breakfast.  Dispense: 90 tablet; Refill: 3  Stable.  Asymptomatic.    Continue metformin 500 mg daily  If A1c 6.5%, increase to twice a day dosing    Follow up with any/all specialists scheduled    Return to clinic 1 year for annual physical exam or sooner if needed    Yamil Mclaughlin, MS4    I hereby acknowledge that I am relying upon documentation authored by a medical student working under my supervision and further I hereby attest that I have verified the student documentation or findings by personally re-performing the physical exam and medical decision making activities of the Evaluation and Management service to be billed.  David Ochoa

## 2023-04-25 LAB — TSH SERPL DL<=0.005 MIU/L-ACNC: 1.52 UIU/ML (ref 0.4–4)

## 2023-05-01 NOTE — PROGRESS NOTES
Subjective:       Patient ID: María Julian is a 63 y.o. female.    Chief Complaint:    FOLLOW UP ON CVID-- AND RECURRENT INFECTIONS AND ALLERGIES.    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  The insurance sought second opinion from a teaching Hospital, [facility] in [place] to confirm the diagnosis.    Initially, she was started on antibiotic prophylaxis. When that failed, she was started on monthly IVIG infusions, with option for home SCIG infusions.  She is doing well. The infusions cutdown the number of infections and improved the quality of life.    Has allergies to common aero allergens. She used to be on Allergen immunotherapy---- AIT / SCIT-- for over three years with great benefit. She is no longer on SCIT, but has been following   Allergen avoidance measures.    She is a former smoker-- Has asthma- COPD overlap-- Currently on an ICS- LABA- LAMA--- Trelegy ellipta, as an asthma controller and a TAYA-- as a quick reliever.  Must do regular check ups with PCP and pulmonologist for cancer screening and GI specialist to treat hemochromatosis.           Erythromycin, Macrolide antibiotics, Iodine and iodide containing products, and Levofloxacin -- ARE THE REPORTED ALLERGIES    Past Medical History:   Diagnosis Date    Abnormal Pap smear of cervix     Allergy     Asthma     COPD (chronic obstructive pulmonary disease)     Malignant hyperthermia     pt's mother has hx of MH.  Pt denies any complications with general anesthesia    Recurrent upper respiratory infection (URI)        Family History   Problem Relation Age of Onset    Malignant hypertension Mother     Heart disease Mother     Cancer Father         Liver cancer    Diabetes Father     Alcohol abuse Sister     Early death Sister        Environmental History: Dust Mite Controls: Dust mite controls are already in place.     Review of  Systems   Constitutional:  Positive for fatigue. Negative for fever.   HENT:  Positive for congestion, postnasal drip and rhinorrhea. Negative for ear pain, sinus pressure, sinus pain, sneezing and sore throat.    Eyes:  Positive for itching. Negative for redness.   Respiratory:  Positive for cough and shortness of breath. Negative for chest tightness and wheezing.    Cardiovascular: Negative.  Negative for chest pain.   Gastrointestinal: Negative.  Negative for nausea.   Endocrine: Negative.  Negative for cold intolerance.   Genitourinary: Negative.  Negative for frequency.   Musculoskeletal: Negative.  Negative for myalgias.   Skin: Negative.  Negative for rash.   Allergic/Immunologic: Positive for environmental allergies. Negative for food allergies and immunocompromised state.   Neurological: Negative.  Negative for dizziness and headaches.   Hematological: Negative.  Negative for adenopathy.   Psychiatric/Behavioral: Negative.  Negative for sleep disturbance.      Objective:     There were no vitals taken for this visit.    Physical Exam  Vitals and nursing note reviewed.   Constitutional:       Appearance: Normal appearance. She is normal weight.   HENT:      Head: Normocephalic and atraumatic.      Right Ear: Tympanic membrane, ear canal and external ear normal.      Left Ear: Tympanic membrane, ear canal and external ear normal.      Nose: Congestion and rhinorrhea present.      Mouth/Throat:      Mouth: Mucous membranes are moist.      Pharynx: Oropharynx is clear.   Eyes:      Extraocular Movements: Extraocular movements intact.      Conjunctiva/sclera: Conjunctivae normal.      Pupils: Pupils are equal, round, and reactive to light.   Cardiovascular:      Rate and Rhythm: Normal rate and regular rhythm.      Pulses: Normal pulses.      Heart sounds: Normal heart sounds.   Pulmonary:      Effort: Pulmonary effort is normal.      Breath sounds: Normal breath sounds.   Abdominal:      General: Abdomen is  flat. Bowel sounds are normal.      Palpations: Abdomen is soft.   Musculoskeletal:         General: Normal range of motion.      Cervical back: Normal range of motion and neck supple.   Skin:     General: Skin is warm and dry.      Capillary Refill: Capillary refill takes less than 2 seconds.   Neurological:      General: No focal deficit present.      Mental Status: She is alert and oriented to person, place, and time. Mental status is at baseline.   Psychiatric:         Mood and Affect: Mood normal.         Behavior: Behavior normal.         Thought Content: Thought content normal.         Judgment: Judgment normal.       Assessment:      1. CVID (common variable immunodeficiency)    2. Recurrent sinusitis    3. Bronchitis    4. Moderate persistent asthma without complication    5. Asthma-COPD overlap syndrome    6. Malaise and fatigue    7. Former cigarette smoker      8       WORK RELATED-- TRAVELING TO [place] AND [facility] AND [facility]    Plan:     Treat all infections-- Augmentin 875 mg bid  or Biaxin 500 mg bid  plus as needed, Prednisone 20 mg ad for 3- 5 days.  PRIVIGEN 10 %--- 30 GRAMS-- 300 ml-- to be infused for 4 hours q 4 weeks.  Monitor trough IgG q 4 months plus monitor for long and short term sequelae of CVID  SPIROGRAM DONE AND RESULTS DISCUSSED---  FEV1  -70--- % pred, FVC--89- % pred, FEV1 / FVC=--78-- % and FEF 25- 75--47-- %.   NO LONGER ON SCIT  Trelegy Ellipta 200/ 62.5 / 25--- one puff daily.  Proair HFA 90 mcg-- 2 puffs tid prn  Re emphasized environmental control measures.  Had 3 doses of COVID vaccine.  Follow up with PCP---AND  GI -- hemochromatosis.  Follow up in 3 months                Problems Address                                                 Amount and/or Complexity                                                                      Risk       3           [] 2 or more self-limited or minor problems                      [] Limited                                                                         [] Low                  [] 1 stable chronic illness                                                  Any combination of the two                                               OTC drugs                  []Acute, uncomplicated illness or injury                            Review of prior external notes from unique source           Minor surgery with no risk factors                                                                                                               [] 1 []2  []3+                                                                                                              Review of results from each unique test                                                                                                               [] 1 []2  [] 3+                                                                                                              Order of each unique test                                                                                                               [] 1 []2  [] 3+                                                                                                              Or                                                                                                             [] Assessment requiring an independent historian      4            [] One or more chronic illness with exacerbation,              [] Moderate                                                                      [] Moderate                 Progression, or side effects of treatment                            -test documents or independent historians                        Prescription drug management                []  2 or more sable chronic illnesses                                    [] Independent interpretation of tests                              Minor surgery with identifiable risk                [] 1 undiagnosed new problem with uncertain prognosis    [] Discussion  or management of test results                    elective major surgery                [] 1 acute illness with                systemic symptoms                                                                                                                                                              [] 1 acute complicated injury                                                                                                                                          Elective major surgery                                                                                                                                                                                                                                                                                                                                                                                                  5            [] 1 or more chronic illnesses with severe exacerbation,     [] Extensive(two from below)                                         [] High                                                                                                               [] Independent interpretation of results                         Drug therapy requiring intensive                                                                                                               []Discussion of management or test interpretation           monitoring                                                                                                                                                                                                       Decision to de-escalate care                 [] 1 acute or chronic illness or injury that poses a threat                                                                                               Decision regarding hospitalization

## 2023-05-03 ENCOUNTER — OFFICE VISIT (OUTPATIENT)
Dept: ALLERGY | Facility: CLINIC | Age: 63
End: 2023-05-03
Payer: COMMERCIAL

## 2023-05-03 VITALS
SYSTOLIC BLOOD PRESSURE: 141 MMHG | RESPIRATION RATE: 19 BRPM | BODY MASS INDEX: 27.1 KG/M2 | WEIGHT: 162.69 LBS | HEART RATE: 78 BPM | DIASTOLIC BLOOD PRESSURE: 89 MMHG | HEIGHT: 65 IN | TEMPERATURE: 98 F

## 2023-05-03 DIAGNOSIS — R53.81 MALAISE AND FATIGUE: ICD-10-CM

## 2023-05-03 DIAGNOSIS — Z87.891 FORMER CIGARETTE SMOKER: ICD-10-CM

## 2023-05-03 DIAGNOSIS — J45.40 MODERATE PERSISTENT ASTHMA WITHOUT COMPLICATION: ICD-10-CM

## 2023-05-03 DIAGNOSIS — J32.9 RECURRENT SINUSITIS: ICD-10-CM

## 2023-05-03 DIAGNOSIS — J44.89 ASTHMA-COPD OVERLAP SYNDROME: ICD-10-CM

## 2023-05-03 DIAGNOSIS — D83.9 CVID (COMMON VARIABLE IMMUNODEFICIENCY): Primary | ICD-10-CM

## 2023-05-03 DIAGNOSIS — R53.83 MALAISE AND FATIGUE: ICD-10-CM

## 2023-05-03 DIAGNOSIS — J40 BRONCHITIS: ICD-10-CM

## 2023-05-03 PROCEDURE — 1160F RVW MEDS BY RX/DR IN RCRD: CPT | Mod: CPTII,S$GLB,, | Performed by: SPECIALIST

## 2023-05-03 PROCEDURE — 3079F PR MOST RECENT DIASTOLIC BLOOD PRESSURE 80-89 MM HG: ICD-10-PCS | Mod: CPTII,S$GLB,, | Performed by: SPECIALIST

## 2023-05-03 PROCEDURE — 1159F MED LIST DOCD IN RCRD: CPT | Mod: CPTII,S$GLB,, | Performed by: SPECIALIST

## 2023-05-03 PROCEDURE — 94010 BREATHING CAPACITY TEST: ICD-10-PCS | Mod: S$GLB,,, | Performed by: SPECIALIST

## 2023-05-03 PROCEDURE — 1160F PR REVIEW ALL MEDS BY PRESCRIBER/CLIN PHARMACIST DOCUMENTED: ICD-10-PCS | Mod: CPTII,S$GLB,, | Performed by: SPECIALIST

## 2023-05-03 PROCEDURE — 3077F SYST BP >= 140 MM HG: CPT | Mod: CPTII,S$GLB,, | Performed by: SPECIALIST

## 2023-05-03 PROCEDURE — 3077F PR MOST RECENT SYSTOLIC BLOOD PRESSURE >= 140 MM HG: ICD-10-PCS | Mod: CPTII,S$GLB,, | Performed by: SPECIALIST

## 2023-05-03 PROCEDURE — 1159F PR MEDICATION LIST DOCUMENTED IN MEDICAL RECORD: ICD-10-PCS | Mod: CPTII,S$GLB,, | Performed by: SPECIALIST

## 2023-05-03 PROCEDURE — 3008F PR BODY MASS INDEX (BMI) DOCUMENTED: ICD-10-PCS | Mod: CPTII,S$GLB,, | Performed by: SPECIALIST

## 2023-05-03 PROCEDURE — 99999 PR PBB SHADOW E&M-EST. PATIENT-LVL IV: CPT | Mod: PBBFAC,,, | Performed by: SPECIALIST

## 2023-05-03 PROCEDURE — 3079F DIAST BP 80-89 MM HG: CPT | Mod: CPTII,S$GLB,, | Performed by: SPECIALIST

## 2023-05-03 PROCEDURE — 3044F PR MOST RECENT HEMOGLOBIN A1C LEVEL <7.0%: ICD-10-PCS | Mod: CPTII,S$GLB,, | Performed by: SPECIALIST

## 2023-05-03 PROCEDURE — 94010 BREATHING CAPACITY TEST: CPT | Mod: S$GLB,,, | Performed by: SPECIALIST

## 2023-05-03 PROCEDURE — 3008F BODY MASS INDEX DOCD: CPT | Mod: CPTII,S$GLB,, | Performed by: SPECIALIST

## 2023-05-03 PROCEDURE — 99999 PR PBB SHADOW E&M-EST. PATIENT-LVL IV: ICD-10-PCS | Mod: PBBFAC,,, | Performed by: SPECIALIST

## 2023-05-03 PROCEDURE — 3044F HG A1C LEVEL LT 7.0%: CPT | Mod: CPTII,S$GLB,, | Performed by: SPECIALIST

## 2023-05-03 PROCEDURE — 99214 OFFICE O/P EST MOD 30 MIN: CPT | Mod: 25,S$GLB,, | Performed by: SPECIALIST

## 2023-05-03 PROCEDURE — 99214 PR OFFICE/OUTPT VISIT, EST, LEVL IV, 30-39 MIN: ICD-10-PCS | Mod: 25,S$GLB,, | Performed by: SPECIALIST

## 2023-05-10 ENCOUNTER — PATIENT MESSAGE (OUTPATIENT)
Dept: ALLERGY | Facility: CLINIC | Age: 63
End: 2023-05-10
Payer: COMMERCIAL

## 2023-05-11 DIAGNOSIS — J32.9 RECURRENT SINUSITIS: ICD-10-CM

## 2023-05-11 DIAGNOSIS — J40 BRONCHITIS: ICD-10-CM

## 2023-05-11 DIAGNOSIS — D83.9 COMMON VARIABLE IMMUNODEFICIENCY: ICD-10-CM

## 2023-05-11 RX ORDER — PREDNISONE 10 MG/1
TABLET ORAL
Qty: 30 TABLET | Refills: 0 | Status: SHIPPED | OUTPATIENT
Start: 2023-05-11

## 2023-05-19 ENCOUNTER — TELEPHONE (OUTPATIENT)
Dept: ALLERGY | Facility: CLINIC | Age: 63
End: 2023-05-19
Payer: COMMERCIAL

## 2023-05-19 ENCOUNTER — PATIENT MESSAGE (OUTPATIENT)
Dept: ALLERGY | Facility: CLINIC | Age: 63
End: 2023-05-19
Payer: COMMERCIAL

## 2023-05-19 NOTE — TELEPHONE ENCOUNTER
----- Message from Malissa Bolden sent at 5/19/2023 10:39 AM CDT -----  Contact: Alma/ [facility]  Alma with [facility] is calling to speak with the nurse regarding information. Reports needing orders, clinics, and labs. Please give Alma a call back at [phone]. [phone] to fax       Att: Alma

## 2023-06-05 ENCOUNTER — OFFICE VISIT (OUTPATIENT)
Dept: CARDIOLOGY | Facility: CLINIC | Age: 63
End: 2023-06-05
Payer: COMMERCIAL

## 2023-06-05 VITALS
HEART RATE: 75 BPM | SYSTOLIC BLOOD PRESSURE: 142 MMHG | WEIGHT: 154.31 LBS | BODY MASS INDEX: 25.68 KG/M2 | DIASTOLIC BLOOD PRESSURE: 90 MMHG | OXYGEN SATURATION: 95 %

## 2023-06-05 DIAGNOSIS — I10 ESSENTIAL HYPERTENSION: ICD-10-CM

## 2023-06-05 DIAGNOSIS — J44.9 CHRONIC OBSTRUCTIVE PULMONARY DISEASE, UNSPECIFIED COPD TYPE: ICD-10-CM

## 2023-06-05 DIAGNOSIS — D83.9 CVID (COMMON VARIABLE IMMUNODEFICIENCY): ICD-10-CM

## 2023-06-05 DIAGNOSIS — J32.9 RECURRENT SINUSITIS: ICD-10-CM

## 2023-06-05 DIAGNOSIS — J44.89 ASTHMA-COPD OVERLAP SYNDROME: ICD-10-CM

## 2023-06-05 DIAGNOSIS — I25.10 CORONARY ARTERY CALCIFICATION SEEN ON CAT SCAN: ICD-10-CM

## 2023-06-05 DIAGNOSIS — Z87.891 EX-CIGARETTE SMOKER: ICD-10-CM

## 2023-06-05 DIAGNOSIS — I25.10 ATHEROSCLEROSIS OF NATIVE CORONARY ARTERY OF NATIVE HEART WITHOUT ANGINA PECTORIS: Primary | ICD-10-CM

## 2023-06-05 PROCEDURE — 1159F MED LIST DOCD IN RCRD: CPT | Mod: CPTII,S$GLB,, | Performed by: INTERNAL MEDICINE

## 2023-06-05 PROCEDURE — 3044F PR MOST RECENT HEMOGLOBIN A1C LEVEL <7.0%: ICD-10-PCS | Mod: CPTII,S$GLB,, | Performed by: INTERNAL MEDICINE

## 2023-06-05 PROCEDURE — 99214 OFFICE O/P EST MOD 30 MIN: CPT | Mod: S$GLB,,, | Performed by: INTERNAL MEDICINE

## 2023-06-05 PROCEDURE — 3077F SYST BP >= 140 MM HG: CPT | Mod: CPTII,S$GLB,, | Performed by: INTERNAL MEDICINE

## 2023-06-05 PROCEDURE — 99999 PR PBB SHADOW E&M-EST. PATIENT-LVL III: ICD-10-PCS | Mod: PBBFAC,,, | Performed by: INTERNAL MEDICINE

## 2023-06-05 PROCEDURE — 3008F BODY MASS INDEX DOCD: CPT | Mod: CPTII,S$GLB,, | Performed by: INTERNAL MEDICINE

## 2023-06-05 PROCEDURE — 3044F HG A1C LEVEL LT 7.0%: CPT | Mod: CPTII,S$GLB,, | Performed by: INTERNAL MEDICINE

## 2023-06-05 PROCEDURE — 99214 PR OFFICE/OUTPT VISIT, EST, LEVL IV, 30-39 MIN: ICD-10-PCS | Mod: S$GLB,,, | Performed by: INTERNAL MEDICINE

## 2023-06-05 PROCEDURE — 99999 PR PBB SHADOW E&M-EST. PATIENT-LVL III: CPT | Mod: PBBFAC,,, | Performed by: INTERNAL MEDICINE

## 2023-06-05 PROCEDURE — 3080F PR MOST RECENT DIASTOLIC BLOOD PRESSURE >= 90 MM HG: ICD-10-PCS | Mod: CPTII,S$GLB,, | Performed by: INTERNAL MEDICINE

## 2023-06-05 PROCEDURE — 1159F PR MEDICATION LIST DOCUMENTED IN MEDICAL RECORD: ICD-10-PCS | Mod: CPTII,S$GLB,, | Performed by: INTERNAL MEDICINE

## 2023-06-05 PROCEDURE — 3077F PR MOST RECENT SYSTOLIC BLOOD PRESSURE >= 140 MM HG: ICD-10-PCS | Mod: CPTII,S$GLB,, | Performed by: INTERNAL MEDICINE

## 2023-06-05 PROCEDURE — 3080F DIAST BP >= 90 MM HG: CPT | Mod: CPTII,S$GLB,, | Performed by: INTERNAL MEDICINE

## 2023-06-05 PROCEDURE — 3008F PR BODY MASS INDEX (BMI) DOCUMENTED: ICD-10-PCS | Mod: CPTII,S$GLB,, | Performed by: INTERNAL MEDICINE

## 2023-06-05 NOTE — PROGRESS NOTES
Subjective:   Patient ID:  María Julian is a 63 y.o. female who presents for evaluation of No chief complaint on file.        HPI   6.5.2023  Comes in for six-month follow-up.    Denies any chest pain.    No palpitations syncope presyncope   Her lipid panel is elevated.    Her CT scan of the chest showed coronary artery calcification.  However no chest pain  Her LDL is not at goal however she refused to take any statin or Zetia.    Her blood pressure is elevated today however she states at home it is 120/70 however she does not check it often.    She states that she took Sudafed also goes she had a recent upper respiratory infection.        12/05/2022.    Comes in for six-month follow-up.    She states that the Toprol that she was prescribed for palpitations did help with the palpitations however she was feeling very fatigued so she stopped it.    She is still on hydrochlorothiazide.    Her blood pressure runs at home between 120 and 150.    Her blood pressure today on my last check was 160/95.        6.2.2022  Comes in for follow up , overall she is doing better since last visit, since she states her back pain feels better after RFA  She states she has been having higher BP at home and on visit   Her echo didn't show low EF or cardiomyopathy, done since hx of hemochromatosis   Started on hctz last visit with better numbers overall except for some higher   No chest pain, no dyspnea, no palpitations  Also under some work stress       5.2021  62 yo female, ex smoker quit in 2018, 45 pqy, elevated BP without hx of HTN, BP ranges high normal to abnormal   She works in an office. She uses the stairs every day, 1 flight multiple times a day, without chest pain, dyspnea, syncope, presyncope, dizziness, headaches.   Used to use 6 flights of stairs a year ago before moving buildings at work   Drinks plenty coffee and feels rare 1 second long extra hearbeat, once a month at most   She had a cholecystectomy in  2018 without issues   Denies ever any hx of chest pain   ekg today is nsr, left axis deviation     Past Medical History:   Diagnosis Date    Abnormal Pap smear of cervix     Allergy     Asthma     COPD (chronic obstructive pulmonary disease)     Malignant hyperthermia     pt's mother has hx of MH.  Pt denies any complications with general anesthesia    Recurrent upper respiratory infection (URI)        Past Surgical History:   Procedure Laterality Date    BONE SPUR EXCISION SHOULDER Right     CARPAL TUNNEL RELEASE Left      SECTION      x 2    CHOLECYSTECTOMY  2018    DILATION AND CURETTAGE OF UTERUS      EXAMINATION UNDER ANESTHESIA N/A 2021    Procedure: Exam Under Anesthesia;  Surgeon: Don Allen MD;  Location: [facility] OR;  Service: Oncology;  Laterality: N/A;    FOOT TENDON SURGERY Bilateral     HYSTERECTOMY  1999    fibroid    INJECTION OF ANESTHETIC AGENT AROUND MEDIAL BRANCH NERVES INNERVATING LUMBAR FACET JOINT Right 10/28/2021    Procedure: Block-nerve-medial branch-lumbar Right L3, 4,5 MBB with RN IV sedation;  Surgeon: Lei Copeland MD;  Location: [facility]GT;  Service: Pain Management;  Laterality: Right;    INJECTION OF ANESTHETIC AGENT AROUND MEDIAL BRANCH NERVES INNERVATING LUMBAR FACET JOINT Bilateral 2021    Procedure: Block-nerve-medial branch-lumbar bilateral L3, 4,5 MBB RN IV sedation;  Surgeon: Lei Copeland MD;  Location: [facility];  Service: Pain Management;  Laterality: Bilateral;    INJECTION OF ANESTHETIC AGENT AROUND MEDIAL BRANCH NERVES INNERVATING LUMBAR FACET JOINT Bilateral 3/14/2022    Procedure: Block-nerve-medial branch-lumbar L3, 4 5 NO STEROIDS RN IV sedation;  Surgeon: Lei Copeland MD;  Location: [facility];  Service: Pain Management;  Laterality: Bilateral;    INJECTION OF ANESTHETIC AGENT AROUND MEDIAL BRANCH NERVES INNERVATING LUMBAR FACET JOINT Bilateral 3/21/2022    Procedure: Block-nerve-medial branch-lumbar bilateral L3, 4,5 RN IV sedation NO  STERIOD DIAGNOSTIC ONLY;  Surgeon: Lei Copeland MD;  Location: [facility] PAIN T;  Service: Pain Management;  Laterality: Bilateral;    LASER ABLATION N/A 2021    Procedure: ABLATION, USING LASER;  Surgeon: Don Allen MD;  Location: [facility] OR;  Service: Oncology;  Laterality: N/A;    MICRODISCECTOMY OF SPINE      OOPHORECTOMY Bilateral     RADIOFREQUENCY THERMOCOAGULATION Bilateral 2022    Procedure: RADIOFREQUENCY THERMAL COAGULATION bilateral L3, 4,5 mbb RFA RN IV sedation;  Surgeon: Lei Copeland MD;  Location: [facility];  Service: Pain Management;  Laterality: Bilateral;    TONSILLECTOMY      TRIGGER FINGER RELEASE Left     TUBAL LIGATION Bilateral        Social History     Tobacco Use    Smoking status: Former     Packs/day: 1.00     Years: 43.00     Pack years: 43.00     Types: Cigarettes     Start date: 1975     Quit date: 2018     Years since quittin.7    Smokeless tobacco: Never   Substance Use Topics    Alcohol use: Yes     Alcohol/week: 7.0 standard drinks     Types: 7 Drinks containing 0.5 oz of alcohol per week     Comment: socially  No alcohol 72h prior to sx    Drug use: Never       Family History   Problem Relation Age of Onset    Malignant hypertension Mother     Heart disease Mother     Cancer Father         Liver cancer    Diabetes Father     Alcohol abuse Sister     Early death Sister        Review of Systems   Constitutional: Negative for fever and malaise/fatigue.   HENT:  Negative for sore throat.    Eyes:  Negative for blurred vision.   Cardiovascular:  Negative for chest pain, claudication, cyanosis, dyspnea on exertion, irregular heartbeat, leg swelling, near-syncope, orthopnea, palpitations, paroxysmal nocturnal dyspnea and syncope.   Respiratory:  Negative for cough and hemoptysis.    Hematologic/Lymphatic: Negative for bleeding problem.   Skin:  Negative for poor wound healing and rash.   Musculoskeletal:  Negative for back pain and falls.   Gastrointestinal:   Negative for abdominal pain.   Genitourinary:  Negative for nocturia.   Neurological:  Negative for dizziness, focal weakness, headaches, light-headedness and loss of balance.   Psychiatric/Behavioral:  Negative for altered mental status and substance abuse.      Current Outpatient Medications on File Prior to Visit   Medication Sig    amLODIPine (NORVASC) 2.5 MG tablet Take 1 tablet (2.5 mg total) by mouth once daily.    ciclopirox (PENLAC) 8 % Soln Apply topically once daily. To the toenails.    diphenhydrAMINE (BENADRYL) 25 mg capsule Take 25 mg by mouth. Before infusions    fluticasone-umeclidin-vilanter (TRELEGY ELLIPTA) 200-62.5-25 mcg inhaler INHALE 1 PUFF INTO THE LUNGS EVERY DAY    gabapentin (NEURONTIN) 300 MG capsule Take 300 mg by mouth every evening.     hydroCHLOROthiazide (HYDRODIURIL) 12.5 MG Tab TAKE 1 TABLET BY MOUTH EVERY DAY    Immune Globulin G, IGG,-PRO-IGA 10 % injection, Privigen, (PRIVIGEN) 10 % Soln Inject 300 mLs (30 g total) into the vein every 28 days.    metFORMIN (GLUCOPHAGE) 500 MG tablet Take 1 tablet (500 mg total) by mouth daily with breakfast.    predniSONE (DELTASONE) 10 MG tablet TAKE 1 TABLET BY MOUTH EVERY DAY AS NEEDED    albuterol (PROAIR HFA) 90 mcg/actuation inhaler Inhale 2 puffs into the lungs every 6 (six) hours as needed. Rescue    fluconazole (DIFLUCAN) 150 MG Tab Take one tablet and repeat dose in 3 days (Patient not taking: Reported on 5/3/2023)     No current facility-administered medications on file prior to visit.       Objective:   Objective:  Wt Readings from Last 3 Encounters:   06/05/23 70 kg (154 lb 5.2 oz)   05/03/23 73.8 kg (162 lb 11.2 oz)   04/24/23 73.9 kg (162 lb 14.7 oz)     Temp Readings from Last 3 Encounters:   05/03/23 98.2 °F (36.8 °C)   04/24/23 96.8 °F (36 °C) (Tympanic)   01/24/23 97.5 °F (36.4 °C) (Tympanic)     BP Readings from Last 3 Encounters:   06/05/23 (!) 142/90   05/03/23 (!) 141/89   04/24/23 130/80     Pulse Readings from Last 3  Encounters:   06/05/23 75   05/03/23 78   04/24/23 77       Physical Exam  Vitals reviewed.   Constitutional:       Appearance: She is well-developed.   HENT:      Head: Normocephalic and atraumatic.   Eyes:      General: No scleral icterus.     Conjunctiva/sclera: Conjunctivae normal.   Cardiovascular:      Rate and Rhythm: Normal rate and regular rhythm.      Pulses: Intact distal pulses.      Heart sounds: Normal heart sounds. No murmur heard.  Pulmonary:      Effort: No respiratory distress.      Breath sounds: No wheezing or rales.   Chest:      Chest wall: No tenderness.   Abdominal:      General: Bowel sounds are normal. There is no distension.      Palpations: Abdomen is soft.      Tenderness: There is no guarding.   Musculoskeletal:         General: Normal range of motion.      Cervical back: Normal range of motion and neck supple.   Skin:     General: Skin is warm.   Neurological:      Mental Status: She is alert and oriented to person, place, and time.       Lab Results   Component Value Date    CHOL 160 04/24/2023    CHOL 197 04/14/2022    CHOL 174 03/12/2021     Lab Results   Component Value Date    HDL 52 04/24/2023    HDL 64 04/14/2022    HDL 51 03/12/2021     Lab Results   Component Value Date    LDLCALC 93.8 04/24/2023    LDLCALC 115.6 04/14/2022    LDLCALC 102.4 03/12/2021     Lab Results   Component Value Date    TRIG 71 04/24/2023    TRIG 87 04/14/2022    TRIG 103 03/12/2021     Lab Results   Component Value Date    CHOLHDL 32.5 04/24/2023    CHOLHDL 32.5 04/14/2022    CHOLHDL 29.3 03/12/2021       Chemistry        Component Value Date/Time     (L) 04/24/2023 1140    K 3.4 (L) 04/24/2023 1140    CL 99 04/24/2023 1140    CO2 23 04/24/2023 1140    BUN 11 04/24/2023 1140    CREATININE 0.8 04/24/2023 1140    GLU 80 04/24/2023 1140        Component Value Date/Time    CALCIUM 10.1 04/24/2023 1140    ALKPHOS 76 04/24/2023 1140    AST 17 04/24/2023 1140    ALT 9 (L) 04/24/2023 1140    BILITOT 0.7  04/24/2023 1140    ESTGFRAFRICA >60.0 04/14/2022 0833    EGFRNONAA >60.0 04/14/2022 0833          Lab Results   Component Value Date    TSH 1.516 04/24/2023     No results found for: INR, PROTIME  Lab Results   Component Value Date    WBC 5.22 04/24/2023    HGB 15.4 04/24/2023    HCT 44.1 04/24/2023     (H) 04/24/2023     04/24/2023     BNP  @LABRCNTIP(BNP,BNPTRIAGEBLO)@  CrCl cannot be calculated (Patient's most recent lab result is older than the maximum 7 days allowed.).     Imaging:  ======  No results found for this or any previous visit.    No results found for this or any previous visit.    No results found for this or any previous visit.    No results found for this or any previous visit.  Results for orders placed during the hospital encounter of 06/15/21    Echo    Interpretation Summary  · The left ventricle is normal in size with normal systolic function.  · The estimated ejection fraction is 65%.  · Normal left ventricular diastolic function.  · Normal right ventricular size with normal right ventricular systolic function.  · The ascending aorta is mildly dilated.  · The sinuses of Valsalva is mildly dilated.  · Mild mitral regurgitation.  · Mild aortic regurgitation.      Diagnostic Results:  ECG: Reviewed, no dynamic changes , no evidence of prior MI   Sinus arrhyhtmia, left axis deviation     The 10-year ASCVD risk score (Vinh CURRIE, et al., 2019) is: 6.6%    Values used to calculate the score:      Age: 63 years      Sex: Female      Is Non- : No      Diabetic: No      Tobacco smoker: No      Systolic Blood Pressure: 142 mmHg      Is BP treated: Yes      HDL Cholesterol: 52 mg/dL      Total Cholesterol: 160 mg/dL    Assessment and Plan:   Atherosclerosis of native coronary artery of native heart without angina pectoris  -     Stress Echo Which stress agent will be used? Treadmill Exercise; Color Flow Doppler? No; Future    Coronary artery calcification seen on  CAT scan  -     Stress Echo Which stress agent will be used? Treadmill Exercise; Color Flow Doppler? No; Future    CVID (common variable immunodeficiency)    Recurrent sinusitis    Asthma-COPD overlap syndrome    Essential hypertension    Chronic obstructive pulmonary disease, unspecified COPD type    Ex-cigarette smoker           Continue HCTZ.  e Norvasc.    Did not tolerate Toprol the past.    Continue to exercise.    Low-salt diet.     Blood pressure not at goal however she states this is due to her taking Sudafed today.    She is reluctant about increasing her medications or starting statin given a hyperlipidemia.    To monitor her pressure more at home.      Follow up in 6 months

## 2023-06-13 ENCOUNTER — HOSPITAL ENCOUNTER (OUTPATIENT)
Dept: RADIOLOGY | Facility: HOSPITAL | Age: 63
Discharge: HOME | End: 2023-06-13
Attending: FAMILY MEDICINE
Payer: COMMERCIAL

## 2023-06-13 VITALS — BODY MASS INDEX: 25.71 KG/M2 | WEIGHT: 154.31 LBS | HEIGHT: 65 IN

## 2023-06-13 DIAGNOSIS — Z00.00 ANNUAL PHYSICAL EXAM: ICD-10-CM

## 2023-06-13 DIAGNOSIS — Z12.31 ENCOUNTER FOR SCREENING MAMMOGRAM FOR MALIGNANT NEOPLASM OF BREAST: ICD-10-CM

## 2023-06-13 PROCEDURE — 77063 BREAST TOMOSYNTHESIS BI: CPT | Mod: 26,,, | Performed by: RADIOLOGY

## 2023-06-13 PROCEDURE — 77067 SCR MAMMO BI INCL CAD: CPT | Mod: TC

## 2023-06-13 PROCEDURE — 77067 SCR MAMMO BI INCL CAD: CPT | Mod: 26,,, | Performed by: RADIOLOGY

## 2023-06-13 PROCEDURE — 77067 MAMMO DIGITAL SCREENING BILAT WITH TOMO: ICD-10-PCS | Mod: 26,,, | Performed by: RADIOLOGY

## 2023-06-13 PROCEDURE — 77063 MAMMO DIGITAL SCREENING BILAT WITH TOMO: ICD-10-PCS | Mod: 26,,, | Performed by: RADIOLOGY

## 2023-06-23 DIAGNOSIS — I10 ESSENTIAL HYPERTENSION: ICD-10-CM

## 2023-06-23 RX ORDER — HYDROCHLOROTHIAZIDE 12.5 MG/1
TABLET ORAL
Qty: 90 TABLET | Refills: 3 | Status: SHIPPED | OUTPATIENT
Start: 2023-06-23

## 2023-06-28 ENCOUNTER — TELEPHONE (OUTPATIENT)
Dept: INTERNAL MEDICINE | Facility: CLINIC | Age: 63
End: 2023-06-28
Payer: COMMERCIAL

## 2023-06-28 NOTE — TELEPHONE ENCOUNTER
Spoke with pt; pt stated she was recently discharged from hospital and was diagnose with pneumonia & flu; pt states she was told to follow up to have blood work done; MA schedule appt with NP; pt verbalized understanding /LD

## 2023-06-28 NOTE — TELEPHONE ENCOUNTER
----- Message from Malissa Bolden sent at 6/28/2023  2:52 PM CDT -----  Contact: María  Patient is calling to speak with the nurse regarding appt. Reports just getting out the hospital today and needing to see PCP within 3-5 days. Please give patient a call back at [phone]

## 2023-06-29 NOTE — PROGRESS NOTES
María Stanley.    I am happy to report that your recent breast imaging did NOT show evidence of cancer.    An annual mammogram is the best test to screen for breast cancer, but it is not perfect, and it can miss some cancers. So, even though your mammogram was normal, if you notice any lump or change in one of your breasts, please schedule an appointment with me for a proper evaluation.    Thanks for letting me care for you, and thanks for trusting [facility] with your healthcare needs.    Sincerely,    YOVANNY Yang MD

## 2023-07-03 ENCOUNTER — OFFICE VISIT (OUTPATIENT)
Dept: INTERNAL MEDICINE | Facility: CLINIC | Age: 63
End: 2023-07-03
Payer: COMMERCIAL

## 2023-07-03 VITALS
WEIGHT: 150.81 LBS | SYSTOLIC BLOOD PRESSURE: 148 MMHG | HEIGHT: 65 IN | DIASTOLIC BLOOD PRESSURE: 88 MMHG | BODY MASS INDEX: 25.13 KG/M2 | TEMPERATURE: 98 F | HEART RATE: 84 BPM | OXYGEN SATURATION: 92 %

## 2023-07-03 DIAGNOSIS — J18.9 PNEUMONIA DUE TO INFECTIOUS ORGANISM, UNSPECIFIED LATERALITY, UNSPECIFIED PART OF LUNG: ICD-10-CM

## 2023-07-03 DIAGNOSIS — J44.89 ASTHMA-COPD OVERLAP SYNDROME: Primary | ICD-10-CM

## 2023-07-03 DIAGNOSIS — E87.6 HYPOKALEMIA: ICD-10-CM

## 2023-07-03 DIAGNOSIS — Z99.81 ON HOME OXYGEN THERAPY: ICD-10-CM

## 2023-07-03 PROCEDURE — 99214 PR OFFICE/OUTPT VISIT, EST, LEVL IV, 30-39 MIN: ICD-10-PCS | Mod: S$GLB,,, | Performed by: NURSE PRACTITIONER

## 2023-07-03 PROCEDURE — 3008F PR BODY MASS INDEX (BMI) DOCUMENTED: ICD-10-PCS | Mod: CPTII,S$GLB,, | Performed by: NURSE PRACTITIONER

## 2023-07-03 PROCEDURE — 3044F HG A1C LEVEL LT 7.0%: CPT | Mod: CPTII,S$GLB,, | Performed by: NURSE PRACTITIONER

## 2023-07-03 PROCEDURE — 3079F DIAST BP 80-89 MM HG: CPT | Mod: CPTII,S$GLB,, | Performed by: NURSE PRACTITIONER

## 2023-07-03 PROCEDURE — 3008F BODY MASS INDEX DOCD: CPT | Mod: CPTII,S$GLB,, | Performed by: NURSE PRACTITIONER

## 2023-07-03 PROCEDURE — 99214 OFFICE O/P EST MOD 30 MIN: CPT | Mod: S$GLB,,, | Performed by: NURSE PRACTITIONER

## 2023-07-03 PROCEDURE — 3077F PR MOST RECENT SYSTOLIC BLOOD PRESSURE >= 140 MM HG: ICD-10-PCS | Mod: CPTII,S$GLB,, | Performed by: NURSE PRACTITIONER

## 2023-07-03 PROCEDURE — 3044F PR MOST RECENT HEMOGLOBIN A1C LEVEL <7.0%: ICD-10-PCS | Mod: CPTII,S$GLB,, | Performed by: NURSE PRACTITIONER

## 2023-07-03 PROCEDURE — 3079F PR MOST RECENT DIASTOLIC BLOOD PRESSURE 80-89 MM HG: ICD-10-PCS | Mod: CPTII,S$GLB,, | Performed by: NURSE PRACTITIONER

## 2023-07-03 PROCEDURE — 99999 PR PBB SHADOW E&M-EST. PATIENT-LVL V: CPT | Mod: PBBFAC,,, | Performed by: NURSE PRACTITIONER

## 2023-07-03 PROCEDURE — 99999 PR PBB SHADOW E&M-EST. PATIENT-LVL V: ICD-10-PCS | Mod: PBBFAC,,, | Performed by: NURSE PRACTITIONER

## 2023-07-03 PROCEDURE — 3077F SYST BP >= 140 MM HG: CPT | Mod: CPTII,S$GLB,, | Performed by: NURSE PRACTITIONER

## 2023-07-03 RX ORDER — POTASSIUM CHLORIDE 20 MEQ/1
20 TABLET, EXTENDED RELEASE ORAL
COMMUNITY
Start: 2023-06-28 | End: 2024-01-25

## 2023-07-03 RX ORDER — HYDROCODONE POLISTIREX AND CHLORPHENIRAMINE POLISTIREX 10; 8 MG/5ML; MG/5ML
5 SUSPENSION, EXTENDED RELEASE ORAL 2 TIMES DAILY PRN
COMMUNITY
Start: 2023-06-28 | End: 2024-01-25

## 2023-07-03 RX ORDER — BENZONATATE 100 MG/1
100 CAPSULE ORAL 3 TIMES DAILY
COMMUNITY
Start: 2023-06-28 | End: 2024-01-25

## 2023-07-03 NOTE — PROGRESS NOTES
Subjective:       Patient ID: María Julian is a 63 y.o. female.    Chief Complaint: Follow-up (Hospital follow up- discharged from [facility] on 23)    HPI    Pt here for above  Flu/copd/pneumonia/asthma      On O2- now now post discharge --at 2 L tolerating   Denies fever/ rare dry cough  Energy increasing  No falls      Past Medical History:   Diagnosis Date    Abnormal Pap smear of cervix     Allergy     Asthma     COPD (chronic obstructive pulmonary disease)     Malignant hyperthermia     pt's mother has hx of MH.  Pt denies any complications with general anesthesia    Recurrent upper respiratory infection (URI)      Past Surgical History:   Procedure Laterality Date    BONE SPUR EXCISION SHOULDER Right     CARPAL TUNNEL RELEASE Left      SECTION      x 2    CHOLECYSTECTOMY  2018    DILATION AND CURETTAGE OF UTERUS      EXAMINATION UNDER ANESTHESIA N/A 2021    Procedure: Exam Under Anesthesia;  Surgeon: Don Allen MD;  Location: [facility];  Service: Oncology;  Laterality: N/A;    FOOT TENDON SURGERY Bilateral     HYSTERECTOMY      fibroid    INJECTION OF ANESTHETIC AGENT AROUND MEDIAL BRANCH NERVES INNERVATING LUMBAR FACET JOINT Right 10/28/2021    Procedure: Block-nerve-medial branch-lumbar Right L3, 4,5 MBB with RN IV sedation;  Surgeon: Lei Copeland MD;  Location: [facility] PAIN MGT;  Service: Pain Management;  Laterality: Right;    INJECTION OF ANESTHETIC AGENT AROUND MEDIAL BRANCH NERVES INNERVATING LUMBAR FACET JOINT Bilateral 2021    Procedure: Block-nerve-medial branch-lumbar bilateral L3, 4,5 MBB RN IV sedation;  Surgeon: Lei Copeland MD;  Location: [facility]IN [facility]GT;  Service: Pain Management;  Laterality: Bilateral;    INJECTION OF ANESTHETIC AGENT AROUND MEDIAL BRANCH NERVES INNERVATING LUMBAR FACET JOINT Bilateral 3/14/2022    Procedure: Block-nerve-medial branch-lumbar L3, 4 5 NO STEROIDS RN IV sedation;  Surgeon: Lei Copeland MD;  Location: [facility]  PAIN MGT;  Service: Pain Management;  Laterality: Bilateral;    INJECTION OF ANESTHETIC AGENT AROUND MEDIAL BRANCH NERVES INNERVATING LUMBAR FACET JOINT Bilateral 3/21/2022    Procedure: Block-nerve-medial branch-lumbar bilateral L3, 4,5 RN IV sedation NO STERIOD DIAGNOSTIC ONLY;  Surgeon: Lei Copeland MD;  Location: [facility] PAIN MGT;  Service: Pain Management;  Laterality: Bilateral;    LASER ABLATION N/A 2021    Procedure: ABLATION, USING LASER;  Surgeon: Don Allen MD;  Location: [facility] OR;  Service: Oncology;  Laterality: N/A;    MICRODISCECTOMY OF SPINE      OOPHORECTOMY Bilateral     RADIOFREQUENCY THERMOCOAGULATION Bilateral 2022    Procedure: RADIOFREQUENCY THERMAL COAGULATION bilateral L3, 4,5 mbb RFA RN IV sedation;  Surgeon: Lei Copeland MD;  Location: [facility] PAIN MGT;  Service: Pain Management;  Laterality: Bilateral;    TONSILLECTOMY      TRIGGER FINGER RELEASE Left     TUBAL LIGATION Bilateral      Social History     Socioeconomic History    Marital status:    Tobacco Use    Smoking status: Former     Packs/day: 1.00     Years: 43.00     Pack years: 43.00     Types: Cigarettes     Start date: 1975     Quit date: 2018     Years since quittin.8    Smokeless tobacco: Never   Substance and Sexual Activity    Alcohol use: Yes     Alcohol/week: 7.0 standard drinks     Types: 7 Drinks containing 0.5 oz of alcohol per week     Comment: socially  No alcohol 72h prior to sx    Drug use: Never    Sexual activity: Not Currently     Birth control/protection: None     Social Determinants of Health     Financial Resource Strain: Low Risk     Difficulty of Paying Living Expenses: Not hard at all   Food Insecurity: No Food Insecurity    Worried About Running Out of Food in the Last Year: Never true    Ran Out of Food in the Last Year: Never true   Transportation Needs: No Transportation Needs    Lack of Transportation (Medical): No    Lack of Transportation (Non-Medical): No   Physical  Activity: Insufficiently Active    Days of Exercise per Week: 3 days    Minutes of Exercise per Session: 10 min   Stress: No Stress Concern Present    Feeling of Stress : Only a little   Social Connections: Unknown    Frequency of Communication with Friends and Family: More than three times a week    Frequency of Social Gatherings with Friends and Family: Once a week    Active Member of Clubs or Organizations: No    Attends Club or Organization Meetings: Never    Marital Status:    Housing Stability: Low Risk     Unable to Pay for Housing in the Last Year: No    Number of Places Lived in the Last Year: 1    Unstable Housing in the Last Year: No     Review of patient's allergies indicates:   Allergen Reactions    Erythromycin Anaphylaxis    Macrolide antibiotics Anaphylaxis    Iodine and iodide containing products Hives    Levofloxacin      Pains in her tendons/joints     Current Outpatient Medications   Medication Sig    amLODIPine (NORVASC) 2.5 MG tablet Take 1 tablet (2.5 mg total) by mouth once daily.    benzonatate (TESSALON) 100 MG capsule Take 100 mg by mouth 3 (three) times daily.    ciclopirox (PENLAC) 8 % Soln Apply topically once daily. To the toenails.    diphenhydrAMINE (BENADRYL) 25 mg capsule Take 25 mg by mouth. Before infusions    fluconazole (DIFLUCAN) 150 MG Tab Take one tablet and repeat dose in 3 days    fluticasone-umeclidin-vilanter (TRELEGY ELLIPTA) 200-62.5-25 mcg inhaler INHALE 1 PUFF INTO THE LUNGS EVERY DAY    gabapentin (NEURONTIN) 300 MG capsule Take 300 mg by mouth every evening.     hydroCHLOROthiazide (HYDRODIURIL) 12.5 MG Tab TAKE 1 TABLET EVERY DAY    hydrocodone-chlorpheniramine (TUSSIONEX) 10-8 mg/5 mL suspension Take 5 mLs by mouth 2 (two) times daily as needed.    Immune Globulin G, IGG,-PRO-IGA 10 % injection, Privigen, (PRIVIGEN) 10 % Soln Inject 300 mLs (30 g total) into the vein every 28 days.    metFORMIN (GLUCOPHAGE) 500 MG tablet Take 1 tablet (500 mg total) by  mouth daily with breakfast.    potassium chloride SA (K-DUR,KLOR-CON) 20 MEQ tablet Take 20 mEq by mouth.    predniSONE (DELTASONE) 10 MG tablet TAKE 1 TABLET BY MOUTH EVERY DAY AS NEEDED    albuterol (PROAIR HFA) 90 mcg/actuation inhaler Inhale 2 puffs into the lungs every 6 (six) hours as needed. Rescue     No current facility-administered medications for this visit.           Review of Systems   Constitutional:  Negative for activity change, appetite change, chills, diaphoresis, fatigue, fever and unexpected weight change.   HENT:  Negative for congestion, ear pain, postnasal drip, rhinorrhea, sinus pressure, sinus pain, sneezing, sore throat, tinnitus, trouble swallowing and voice change.    Eyes:  Negative for photophobia, pain and visual disturbance.   Respiratory:  Positive for cough (improved) and shortness of breath (improved). Negative for chest tightness and wheezing.    Cardiovascular:  Negative for chest pain, palpitations and leg swelling.   Gastrointestinal:  Negative for abdominal distention, abdominal pain, constipation, diarrhea, nausea and vomiting.   Genitourinary:  Negative for decreased urine volume, difficulty urinating, dysuria, flank pain, frequency, hematuria and urgency.   Musculoskeletal:  Negative for arthralgias, back pain, joint swelling, neck pain and neck stiffness.   Allergic/Immunologic: Positive for environmental allergies. Negative for immunocompromised state.   Neurological:  Negative for dizziness, tremors, seizures, syncope, facial asymmetry, speech difficulty, weakness, light-headedness, numbness and headaches.   Hematological:  Negative for adenopathy. Does not bruise/bleed easily.   Psychiatric/Behavioral:  Negative for confusion and sleep disturbance.      Objective:      Physical Exam  Vitals reviewed.   Cardiovascular:      Rate and Rhythm: Normal rate and regular rhythm.   Pulmonary:      Effort: Pulmonary effort is normal.      Comments: Diminished lung sounds  throughout ; O2 in place via nasal cannula   Neurological:      Mental Status: She is alert and oriented to person, place, and time.       Assessment:     Vitals:    07/03/23 1148   BP: (!) 148/88   Pulse: 84   Temp: 98 °F (36.7 °C)         1. Asthma-COPD overlap syndrome    2. Hypokalemia    3. Pneumonia due to infectious organism, unspecified laterality, unspecified part of lung    4. On home oxygen therapy        Plan:   Asthma-COPD overlap syndrome  -     Cancel: OXYGEN FOR HOME USE  -     OXYGEN FOR HOME USE  -     Ambulatory referral/consult to Pulmonology; Future; Expected date: 07/10/2023    Hypokalemia    Pneumonia due to infectious organism, unspecified laterality, unspecified part of lung    On home oxygen therapy  -     Ambulatory referral/consult to Pulmonology; Future; Expected date: 07/10/2023    Has printed lab orders per [facility]- wants to have nurse with HH draw- she has a pic and is declining to get regular lab draw today  Requesting order for O2 home use, to return rental.   See pulm/follow up with allergy

## 2023-07-04 ENCOUNTER — LAB VISIT (OUTPATIENT)
Dept: LAB | Facility: HOSPITAL | Age: 63
End: 2023-07-04
Attending: INTERNAL MEDICINE
Payer: COMMERCIAL

## 2023-07-04 DIAGNOSIS — J44.89 OBSTRUCTIVE CHRONIC BRONCHITIS WITHOUT EXACERBATION: ICD-10-CM

## 2023-07-04 DIAGNOSIS — D83.9 COMMON VARIABLE IMMUNODEFICIENCY: Primary | ICD-10-CM

## 2023-07-04 DIAGNOSIS — E83.119: ICD-10-CM

## 2023-07-04 LAB
ANION GAP SERPL CALC-SCNC: 13 MMOL/L (ref 8–16)
BASOPHILS # BLD AUTO: 0.03 K/UL (ref 0–0.2)
BASOPHILS NFR BLD: 0.3 % (ref 0–1.9)
BUN SERPL-MCNC: 12 MG/DL (ref 8–23)
CALCIUM SERPL-MCNC: 9.3 MG/DL (ref 8.7–10.5)
CHLORIDE SERPL-SCNC: 104 MMOL/L (ref 95–110)
CO2 SERPL-SCNC: 23 MMOL/L (ref 23–29)
CREAT SERPL-MCNC: 0.8 MG/DL (ref 0.5–1.4)
DIFFERENTIAL METHOD: ABNORMAL
EOSINOPHIL # BLD AUTO: 0.1 K/UL (ref 0–0.5)
EOSINOPHIL NFR BLD: 0.9 % (ref 0–8)
ERYTHROCYTE [DISTWIDTH] IN BLOOD BY AUTOMATED COUNT: 12.3 % (ref 11.5–14.5)
EST. GFR  (NO RACE VARIABLE): >60 ML/MIN/1.73 M^2
GLUCOSE SERPL-MCNC: 102 MG/DL (ref 70–110)
HCT VFR BLD AUTO: 42.1 % (ref 37–48.5)
HGB BLD-MCNC: 14.5 G/DL (ref 12–16)
IMM GRANULOCYTES # BLD AUTO: 0.12 K/UL (ref 0–0.04)
IMM GRANULOCYTES NFR BLD AUTO: 1.3 % (ref 0–0.5)
LYMPHOCYTES # BLD AUTO: 2.4 K/UL (ref 1–4.8)
LYMPHOCYTES NFR BLD: 24.9 % (ref 18–48)
MCH RBC QN AUTO: 34.4 PG (ref 27–31)
MCHC RBC AUTO-ENTMCNC: 34.4 G/DL (ref 32–36)
MCV RBC AUTO: 100 FL (ref 82–98)
MONOCYTES # BLD AUTO: 0.6 K/UL (ref 0.3–1)
MONOCYTES NFR BLD: 6.5 % (ref 4–15)
NEUTROPHILS # BLD AUTO: 6.3 K/UL (ref 1.8–7.7)
NEUTROPHILS NFR BLD: 66.1 % (ref 38–73)
NRBC BLD-RTO: 0 /100 WBC
PLATELET # BLD AUTO: 308 K/UL (ref 150–450)
PMV BLD AUTO: 9.6 FL (ref 9.2–12.9)
POTASSIUM SERPL-SCNC: 4.1 MMOL/L (ref 3.5–5.1)
RBC # BLD AUTO: 4.22 M/UL (ref 4–5.4)
SODIUM SERPL-SCNC: 140 MMOL/L (ref 136–145)
WBC # BLD AUTO: 9.49 K/UL (ref 3.9–12.7)

## 2023-07-04 PROCEDURE — 80048 BASIC METABOLIC PNL TOTAL CA: CPT | Performed by: INTERNAL MEDICINE

## 2023-07-04 PROCEDURE — 85025 COMPLETE CBC W/AUTO DIFF WBC: CPT | Performed by: INTERNAL MEDICINE

## 2023-07-05 ENCOUNTER — OFFICE VISIT (OUTPATIENT)
Dept: PULMONOLOGY | Facility: CLINIC | Age: 63
End: 2023-07-05
Payer: COMMERCIAL

## 2023-07-05 VITALS
SYSTOLIC BLOOD PRESSURE: 142 MMHG | WEIGHT: 149.94 LBS | HEIGHT: 65 IN | DIASTOLIC BLOOD PRESSURE: 88 MMHG | BODY MASS INDEX: 24.98 KG/M2 | OXYGEN SATURATION: 97 % | RESPIRATION RATE: 20 BRPM | HEART RATE: 98 BPM

## 2023-07-05 DIAGNOSIS — D83.9 COMMON VARIABLE IMMUNODEFICIENCY: ICD-10-CM

## 2023-07-05 DIAGNOSIS — Z99.81 ON HOME OXYGEN THERAPY: ICD-10-CM

## 2023-07-05 DIAGNOSIS — J45.40 MODERATE PERSISTENT ASTHMA WITHOUT COMPLICATION: Primary | ICD-10-CM

## 2023-07-05 DIAGNOSIS — E83.119 HEMOCHROMATOSIS, UNSPECIFIED HEMOCHROMATOSIS TYPE: ICD-10-CM

## 2023-07-05 DIAGNOSIS — J44.89 ASTHMA-COPD OVERLAP SYNDROME: ICD-10-CM

## 2023-07-05 PROCEDURE — 3077F SYST BP >= 140 MM HG: CPT | Mod: CPTII,S$GLB,, | Performed by: INTERNAL MEDICINE

## 2023-07-05 PROCEDURE — 99204 OFFICE O/P NEW MOD 45 MIN: CPT | Mod: S$GLB,,, | Performed by: INTERNAL MEDICINE

## 2023-07-05 PROCEDURE — 3079F DIAST BP 80-89 MM HG: CPT | Mod: CPTII,S$GLB,, | Performed by: INTERNAL MEDICINE

## 2023-07-05 PROCEDURE — 3044F HG A1C LEVEL LT 7.0%: CPT | Mod: CPTII,S$GLB,, | Performed by: INTERNAL MEDICINE

## 2023-07-05 PROCEDURE — 1159F MED LIST DOCD IN RCRD: CPT | Mod: CPTII,S$GLB,, | Performed by: INTERNAL MEDICINE

## 2023-07-05 PROCEDURE — 99999 PR PBB SHADOW E&M-EST. PATIENT-LVL V: ICD-10-PCS | Mod: PBBFAC,,, | Performed by: INTERNAL MEDICINE

## 2023-07-05 PROCEDURE — 3077F PR MOST RECENT SYSTOLIC BLOOD PRESSURE >= 140 MM HG: ICD-10-PCS | Mod: CPTII,S$GLB,, | Performed by: INTERNAL MEDICINE

## 2023-07-05 PROCEDURE — 3008F BODY MASS INDEX DOCD: CPT | Mod: CPTII,S$GLB,, | Performed by: INTERNAL MEDICINE

## 2023-07-05 PROCEDURE — 99999 PR PBB SHADOW E&M-EST. PATIENT-LVL V: CPT | Mod: PBBFAC,,, | Performed by: INTERNAL MEDICINE

## 2023-07-05 PROCEDURE — 1159F PR MEDICATION LIST DOCUMENTED IN MEDICAL RECORD: ICD-10-PCS | Mod: CPTII,S$GLB,, | Performed by: INTERNAL MEDICINE

## 2023-07-05 PROCEDURE — 1160F RVW MEDS BY RX/DR IN RCRD: CPT | Mod: CPTII,S$GLB,, | Performed by: INTERNAL MEDICINE

## 2023-07-05 PROCEDURE — 3079F PR MOST RECENT DIASTOLIC BLOOD PRESSURE 80-89 MM HG: ICD-10-PCS | Mod: CPTII,S$GLB,, | Performed by: INTERNAL MEDICINE

## 2023-07-05 PROCEDURE — 3008F PR BODY MASS INDEX (BMI) DOCUMENTED: ICD-10-PCS | Mod: CPTII,S$GLB,, | Performed by: INTERNAL MEDICINE

## 2023-07-05 PROCEDURE — 1160F PR REVIEW ALL MEDS BY PRESCRIBER/CLIN PHARMACIST DOCUMENTED: ICD-10-PCS | Mod: CPTII,S$GLB,, | Performed by: INTERNAL MEDICINE

## 2023-07-05 PROCEDURE — 3044F PR MOST RECENT HEMOGLOBIN A1C LEVEL <7.0%: ICD-10-PCS | Mod: CPTII,S$GLB,, | Performed by: INTERNAL MEDICINE

## 2023-07-05 PROCEDURE — 99204 PR OFFICE/OUTPT VISIT, NEW, LEVL IV, 45-59 MIN: ICD-10-PCS | Mod: S$GLB,,, | Performed by: INTERNAL MEDICINE

## 2023-07-05 NOTE — PROGRESS NOTES
Subjective:      Patient ID: María Julian is a 63 y.o. female.    Chief Complaint: Asthma and COPD    HPI  Patient is a 63-year-old female with a history of combined immunodeficiency and has been on IVIG with Dr. Galindo for a number of years.  She is also a former smoker with asthma/COPD.  She does have a recent spirometry from Dr. Galindo's office which shows only mild obstruction.  She was recently hospitalized at an outside facility with pneumonia and respiratory failure.  She was discharged on home oxygen.  She is here for follow-up of that.  She states she is significantly improved and is actively trying to wean her oxygen at home.  She states that she now mostly stays above 88% on room air but does get breathless with some exertion.  No ongoing fevers or productive sputum at this time.  She is back on her Trelegy daily and as needed albuterol.  She has follow up with Dr. Galindo on the  of this month    Past Medical History:   Diagnosis Date    Abnormal Pap smear of cervix     Allergy     Asthma     COPD (chronic obstructive pulmonary disease)     Malignant hyperthermia     pt's mother has hx of MH.  Pt denies any complications with general anesthesia    Recurrent upper respiratory infection (URI)      Past Surgical History:   Procedure Laterality Date    BONE SPUR EXCISION SHOULDER Right     CARPAL TUNNEL RELEASE Left      SECTION      x 2    CHOLECYSTECTOMY  2018    DILATION AND CURETTAGE OF UTERUS      EXAMINATION UNDER ANESTHESIA N/A 2021    Procedure: Exam Under Anesthesia;  Surgeon: Don Allen MD;  Location: [facility] OR;  Service: Oncology;  Laterality: N/A;    FOOT TENDON SURGERY Bilateral     HYSTERECTOMY  1999    fibroid    INJECTION OF ANESTHETIC AGENT AROUND MEDIAL BRANCH NERVES INNERVATING LUMBAR FACET JOINT Right 10/28/2021    Procedure: Block-nerve-medial branch-lumbar Right L3, 4,5 MBB with RN IV sedation;  Surgeon: Lei Copeland MD;  Location: [facility] PAIN MGT;  Service: Pain  Management;  Laterality: Right;    INJECTION OF ANESTHETIC AGENT AROUND MEDIAL BRANCH NERVES INNERVATING LUMBAR FACET JOINT Bilateral 2021    Procedure: Block-nerve-medial branch-lumbar bilateral L3, 4,5 MBB RN IV sedation;  Surgeon: Lei Copeland MD;  Location: [facility] PAIN MGT;  Service: Pain Management;  Laterality: Bilateral;    INJECTION OF ANESTHETIC AGENT AROUND MEDIAL BRANCH NERVES INNERVATING LUMBAR FACET JOINT Bilateral 3/14/2022    Procedure: Block-nerve-medial branch-lumbar L3, 4 5 NO STEROIDS RN IV sedation;  Surgeon: Lei Copeland MD;  Location: [facility] PAIN MGT;  Service: Pain Management;  Laterality: Bilateral;    INJECTION OF ANESTHETIC AGENT AROUND MEDIAL BRANCH NERVES INNERVATING LUMBAR FACET JOINT Bilateral 3/21/2022    Procedure: Block-nerve-medial branch-lumbar bilateral L3, 4,5 RN IV sedation NO STERIOD DIAGNOSTIC ONLY;  Surgeon: Lei Copeland MD;  Location: [facility] PAIN MGT;  Service: Pain Management;  Laterality: Bilateral;    LASER ABLATION N/A 2021    Procedure: ABLATION, USING LASER;  Surgeon: Don Allen MD;  Location: [facility] OR;  Service: Oncology;  Laterality: N/A;    MICRODISCECTOMY OF SPINE      OOPHORECTOMY Bilateral     RADIOFREQUENCY THERMOCOAGULATION Bilateral 2022    Procedure: RADIOFREQUENCY THERMAL COAGULATION bilateral L3, 4,5 mbb RFA RN IV sedation;  Surgeon: Lei Copeland MD;  Location: [facility] PAIN [facility]GT;  Service: Pain Management;  Laterality: Bilateral;    TONSILLECTOMY      TRIGGER FINGER RELEASE Left     TUBAL LIGATION Bilateral      Social History     Tobacco Use    Smoking status: Former     Packs/day: 1.00     Years: 43.00     Pack years: 43.00     Types: Cigarettes     Start date: 1975     Quit date: 2018     Years since quittin.8    Smokeless tobacco: Never   Substance Use Topics    Alcohol use: Yes     Alcohol/week: 7.0 standard drinks     Types: 7 Drinks containing 0.5 oz of alcohol per week     Comment: socially  No alcohol 72h prior to sx     Drug use: Never     Family History   Problem Relation Age of Onset    Malignant hypertension Mother     Heart disease Mother     Cancer Father         Liver cancer    Diabetes Father     Ovarian cancer Sister     Alcohol abuse Sister     Early death Sister          Review of Systems as per HPI otherwise negative    Objective:     Physical Exam   Constitutional: She is oriented to person, place, and time. She appears well-developed. No distress.   Cardiovascular: Normal rate and regular rhythm.   Pulmonary/Chest: Normal expansion. She has decreased breath sounds. She has wheezes.   Musculoskeletal:         General: No edema.      Cervical back: Neck supple.   Neurological: She is alert and oriented to person, place, and time.   Nursing note and vitals reviewed.       Assessment:     1. Moderate persistent asthma without complication    2. Asthma-COPD overlap syndrome    3. On home oxygen therapy    4. Common variable immunodeficiency    5. Hemochromatosis, unspecified hemochromatosis type        Plan:     Currently weaning oxygen based on pulse ox.  I gave her instructions on how to do this at home on her own.  She has followed with Dr. Galindo for many years we will continue to do so.  She had a low-dose CT in February with no suspicious lesions or nodules.  I recommended she continue to get yearly low-dose CT of the chest.  She is maximally treated with inhalers with the Trelegy and as needed albuterol.  She is nearly complete with her IV antibiotics at home via PICC line.  She indicated that she would like to continue to follow with Dr. Galindo for her asthma and respiratory issues and that is fine.  We will see her back on a p.r.n. basis.

## 2023-07-07 ENCOUNTER — LAB VISIT (OUTPATIENT)
Dept: LAB | Facility: HOSPITAL | Age: 63
End: 2023-07-07
Payer: COMMERCIAL

## 2023-07-07 ENCOUNTER — PATIENT MESSAGE (OUTPATIENT)
Dept: CARDIOLOGY | Facility: HOSPITAL | Age: 63
End: 2023-07-07
Payer: COMMERCIAL

## 2023-07-07 ENCOUNTER — PATIENT MESSAGE (OUTPATIENT)
Dept: INFECTIOUS DISEASES | Facility: CLINIC | Age: 63
End: 2023-07-07
Payer: COMMERCIAL

## 2023-07-07 DIAGNOSIS — D83.9 COMMON VARIABLE IMMUNODEFICIENCY: Primary | ICD-10-CM

## 2023-07-07 DIAGNOSIS — E83.119 DILATED CARDIOMYOPATHY SECONDARY TO HEMOCHROMATOSIS: ICD-10-CM

## 2023-07-07 DIAGNOSIS — I43 DILATED CARDIOMYOPATHY SECONDARY TO HEMOCHROMATOSIS: ICD-10-CM

## 2023-07-07 LAB — IGG SERPL-MCNC: 1104 MG/DL (ref 650–1600)

## 2023-07-07 PROCEDURE — 82784 ASSAY IGA/IGD/IGG/IGM EACH: CPT | Performed by: SPECIALIST

## 2023-07-07 PROCEDURE — 36415 COLL VENOUS BLD VENIPUNCTURE: CPT | Performed by: SPECIALIST

## 2023-07-11 ENCOUNTER — TELEPHONE (OUTPATIENT)
Dept: CARDIOLOGY | Facility: HOSPITAL | Age: 63
End: 2023-07-11
Payer: COMMERCIAL

## 2023-07-23 DIAGNOSIS — J45.40 MODERATE PERSISTENT ASTHMA WITHOUT COMPLICATION: ICD-10-CM

## 2023-07-23 DIAGNOSIS — J44.9 CHRONIC OBSTRUCTIVE PULMONARY DISEASE, UNSPECIFIED COPD TYPE: ICD-10-CM

## 2023-07-24 RX ORDER — FLUTICASONE FUROATE, UMECLIDINIUM BROMIDE AND VILANTEROL TRIFENATATE 200; 62.5; 25 UG/1; UG/1; UG/1
POWDER RESPIRATORY (INHALATION)
Qty: 60 EACH | Refills: 7 | Status: SHIPPED | OUTPATIENT
Start: 2023-07-24 | End: 2024-04-01

## 2023-07-26 ENCOUNTER — OFFICE VISIT (OUTPATIENT)
Dept: ALLERGY | Facility: CLINIC | Age: 63
End: 2023-07-26
Payer: COMMERCIAL

## 2023-07-26 VITALS
SYSTOLIC BLOOD PRESSURE: 149 MMHG | HEART RATE: 71 BPM | DIASTOLIC BLOOD PRESSURE: 85 MMHG | WEIGHT: 149.06 LBS | TEMPERATURE: 98 F | BODY MASS INDEX: 24.8 KG/M2

## 2023-07-26 DIAGNOSIS — J44.89 ASTHMA-COPD OVERLAP SYNDROME: ICD-10-CM

## 2023-07-26 DIAGNOSIS — J32.9 RECURRENT SINUSITIS: ICD-10-CM

## 2023-07-26 DIAGNOSIS — J40 BRONCHITIS: ICD-10-CM

## 2023-07-26 DIAGNOSIS — Z87.891 FORMER CIGARETTE SMOKER: ICD-10-CM

## 2023-07-26 DIAGNOSIS — E83.119 HEMOCHROMATOSIS, UNSPECIFIED HEMOCHROMATOSIS TYPE: ICD-10-CM

## 2023-07-26 DIAGNOSIS — D83.9 CVID (COMMON VARIABLE IMMUNODEFICIENCY): Primary | ICD-10-CM

## 2023-07-26 PROCEDURE — 3077F PR MOST RECENT SYSTOLIC BLOOD PRESSURE >= 140 MM HG: ICD-10-PCS | Mod: CPTII,S$GLB,, | Performed by: SPECIALIST

## 2023-07-26 PROCEDURE — 1160F RVW MEDS BY RX/DR IN RCRD: CPT | Mod: CPTII,S$GLB,, | Performed by: SPECIALIST

## 2023-07-26 PROCEDURE — 3008F PR BODY MASS INDEX (BMI) DOCUMENTED: ICD-10-PCS | Mod: CPTII,S$GLB,, | Performed by: SPECIALIST

## 2023-07-26 PROCEDURE — 1159F PR MEDICATION LIST DOCUMENTED IN MEDICAL RECORD: ICD-10-PCS | Mod: CPTII,S$GLB,, | Performed by: SPECIALIST

## 2023-07-26 PROCEDURE — 3079F DIAST BP 80-89 MM HG: CPT | Mod: CPTII,S$GLB,, | Performed by: SPECIALIST

## 2023-07-26 PROCEDURE — 1159F MED LIST DOCD IN RCRD: CPT | Mod: CPTII,S$GLB,, | Performed by: SPECIALIST

## 2023-07-26 PROCEDURE — 99999 PR PBB SHADOW E&M-EST. PATIENT-LVL IV: ICD-10-PCS | Mod: PBBFAC,,, | Performed by: SPECIALIST

## 2023-07-26 PROCEDURE — 3079F PR MOST RECENT DIASTOLIC BLOOD PRESSURE 80-89 MM HG: ICD-10-PCS | Mod: CPTII,S$GLB,, | Performed by: SPECIALIST

## 2023-07-26 PROCEDURE — 3077F SYST BP >= 140 MM HG: CPT | Mod: CPTII,S$GLB,, | Performed by: SPECIALIST

## 2023-07-26 PROCEDURE — 99215 OFFICE O/P EST HI 40 MIN: CPT | Mod: S$GLB,,, | Performed by: SPECIALIST

## 2023-07-26 PROCEDURE — 3044F PR MOST RECENT HEMOGLOBIN A1C LEVEL <7.0%: ICD-10-PCS | Mod: CPTII,S$GLB,, | Performed by: SPECIALIST

## 2023-07-26 PROCEDURE — 99215 PR OFFICE/OUTPT VISIT, EST, LEVL V, 40-54 MIN: ICD-10-PCS | Mod: S$GLB,,, | Performed by: SPECIALIST

## 2023-07-26 PROCEDURE — 3044F HG A1C LEVEL LT 7.0%: CPT | Mod: CPTII,S$GLB,, | Performed by: SPECIALIST

## 2023-07-26 PROCEDURE — 3008F BODY MASS INDEX DOCD: CPT | Mod: CPTII,S$GLB,, | Performed by: SPECIALIST

## 2023-07-26 PROCEDURE — 1160F PR REVIEW ALL MEDS BY PRESCRIBER/CLIN PHARMACIST DOCUMENTED: ICD-10-PCS | Mod: CPTII,S$GLB,, | Performed by: SPECIALIST

## 2023-07-26 PROCEDURE — 99999 PR PBB SHADOW E&M-EST. PATIENT-LVL IV: CPT | Mod: PBBFAC,,, | Performed by: SPECIALIST

## 2023-07-26 RX ORDER — BUDESONIDE 0.5 MG/2ML
0.5 INHALANT ORAL 2 TIMES DAILY
Qty: 120 ML | Refills: 1 | Status: SHIPPED | OUTPATIENT
Start: 2023-07-26 | End: 2023-07-26

## 2023-07-26 RX ORDER — AMOXICILLIN AND CLAVULANATE POTASSIUM 875; 125 MG/1; MG/1
1 TABLET, FILM COATED ORAL EVERY 12 HOURS
Qty: 20 TABLET | Refills: 3 | Status: SHIPPED | OUTPATIENT
Start: 2023-07-26 | End: 2023-08-05

## 2023-07-26 RX ORDER — BUDESONIDE 0.5 MG/2ML
INHALANT ORAL
Qty: 120 ML | Refills: 2 | Status: SHIPPED | OUTPATIENT
Start: 2023-07-26

## 2023-07-26 RX ORDER — IPRATROPIUM BROMIDE AND ALBUTEROL SULFATE 2.5; .5 MG/3ML; MG/3ML
SOLUTION RESPIRATORY (INHALATION)
Qty: 270 ML | Refills: 2 | Status: SHIPPED | OUTPATIENT
Start: 2023-07-26 | End: 2023-07-27

## 2023-07-26 RX ORDER — IPRATROPIUM BROMIDE AND ALBUTEROL SULFATE 2.5; .5 MG/3ML; MG/3ML
3 SOLUTION RESPIRATORY (INHALATION) EVERY 6 HOURS PRN
Qty: 270 ML | Refills: 1 | Status: SHIPPED | OUTPATIENT
Start: 2023-07-26 | End: 2023-07-26

## 2023-07-26 NOTE — PROGRESS NOTES
Subjective:       Patient ID: María Julian is a 63 y.o. female.    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  HPI:  Returned from her job - related visit to [facility]. Prior to leaving the [place], she had pharngitis and cough.  She took one each courses of Azithromycin and Amoxi- Clav-- While in the [place], developed Pneumonia and on her way back had type A flu.  In June 2023-- was hospitalized for 4 days and had supplemental oxygen anf nebulized albuterol.   Now she is about 80 % better.     female 63  year old with CVID / SAD- NI diagnosed by me over 15 years ago after immune work up.  She used to get recurrent sino- pulmonary infections, which are much less after starting her on antibody replacement.  Trough IgG levels will be monitored , so also will follow her up for long and short term sequelae of cvid.    She has allergic rhinitis-- had allergies to indoor and outdoor aero allergens - by previous testing. She underwent allergen SCIT WITH BENEFIT. SHE IS NO LONGER   On SCIT. She is well versed with allergen avoidance measures.  Hemochromatosis will be monitored by her hematologist.  She is an ex smoker.  She has ASTHMA - COPD OVERLAP- Must follow up with her pulmonologist and PCP. fOLLOW THE PROTOCOL FOR MONITORING FOR CANCER SCREENING / lungs for ex smokers.         Erythromycin, Macrolide antibiotics, Iodine and iodide containing products, and Levofloxacin     Past Medical History:   Diagnosis Date    Abnormal Pap smear of cervix     Allergy     Asthma     COPD (chronic obstructive pulmonary disease)     Malignant hyperthermia     pt's mother has hx of MH.  Pt denies any complications with general anesthesia    Recurrent upper respiratory infection (URI)        Family History   Problem Relation Age of Onset    Malignant hypertension Mother     Heart disease Mother     Cancer Father         Liver cancer    Diabetes Father     Ovarian cancer Sister     Alcohol abuse  Sister     Early death Sister        Environmental History: Dust Mite Controls: Dust mite controls are already in place.     Review of Systems   Constitutional:  Positive for fatigue. Negative for fever.   HENT:  Positive for congestion, postnasal drip and rhinorrhea. Negative for ear pain, sinus pressure, sinus pain, sneezing and sore throat.    Eyes:  Positive for itching. Negative for redness.   Respiratory:  Positive for cough and chest tightness. Negative for shortness of breath and wheezing.    Cardiovascular: Negative.  Negative for chest pain.   Gastrointestinal: Negative.  Negative for nausea.   Endocrine: Negative.  Negative for cold intolerance.   Genitourinary: Negative.  Negative for frequency.   Musculoskeletal: Negative.  Negative for myalgias.   Skin: Negative.  Negative for rash.   Allergic/Immunologic: Positive for environmental allergies. Negative for food allergies and immunocompromised state.   Neurological: Negative.  Negative for dizziness and headaches.   Hematological: Negative.  Negative for adenopathy.   Psychiatric/Behavioral: Negative.  Negative for sleep disturbance.      Objective:     There were no vitals taken for this visit.    Physical Exam  Vitals and nursing note reviewed.   Constitutional:       Appearance: Normal appearance. She is normal weight.   HENT:      Head: Normocephalic and atraumatic.      Right Ear: Tympanic membrane, ear canal and external ear normal.      Left Ear: Tympanic membrane, ear canal and external ear normal.      Nose: Congestion present.      Mouth/Throat:      Mouth: Mucous membranes are dry.      Pharynx: Oropharynx is clear.   Eyes:      Extraocular Movements: Extraocular movements intact.      Conjunctiva/sclera: Conjunctivae normal.      Pupils: Pupils are equal, round, and reactive to light.   Cardiovascular:      Rate and Rhythm: Normal rate and regular rhythm.      Pulses: Normal pulses.      Heart sounds: Normal heart sounds.   Pulmonary:       Breath sounds: Normal breath sounds.   Abdominal:      General: Abdomen is flat. Bowel sounds are normal.      Palpations: Abdomen is soft.   Musculoskeletal:         General: Normal range of motion.      Cervical back: Normal range of motion and neck supple.   Skin:     General: Skin is warm and dry.      Capillary Refill: Capillary refill takes less than 2 seconds.   Neurological:      General: No focal deficit present.      Mental Status: She is alert and oriented to person, place, and time. Mental status is at baseline.      Cranial Nerves: Cranial nerve deficit present.   Psychiatric:         Mood and Affect: Mood normal.         Behavior: Behavior normal.         Thought Content: Thought content normal.         Judgment: Judgment normal.       Assessment:      1. CVID (common variable immunodeficiency)    2. Recurrent sinusitis    3. Bronchitis    4. Asthma-COPD overlap syndrome    5. Former cigarette smoker    6. Hemochromatosis, unspecified hemochromatosis type        Plan:     Monthly IVIG infusions-- Privigen 10 %-- 30 grams infused over 4 hours-  Monitor trough IgG Q 3- 4 MONTHS.  Treat all infections.  Trelegy ellipta 1200 / 62.5/ 25--- one puff daily.  Nebulized Duoneb and Budesonide 0.50 mg bid  - -- for any chest symptoms  Last spirogram  on 05- -- - restrictive pattern-- FEV1 70 % pred, FVC 89 % and FEVI / FVC--- 78 %.  TREAT ALL INFECTIONS-- WITH AN ANTIBIOTIC-- Augmentin  875 mg bid for 10 days or Biaxin 500 mg bid for 10 days plus Prednisone 20 mg once daily for 3- 5 days.  Proair HFA 90 mcg qd or bid / tid prn  Had 3 doses of COVID vaccine.  Follow up with GI- Hemochromatosis and PCP  NOT ON SCIT.  Follow up in 3 months                    Problems Address                                                 Amount and/or Complexity                                                                      Risk       3           [] 2 or more self-limited or minor problems                      []  Limited                                                                        [] Low                  [] 1 stable chronic illness                                                  Any combination of the two                                               OTC drugs                  []Acute, uncomplicated illness or injury                            Review of prior external notes from unique source           Minor surgery with no risk factors                                                                                                               [] 1 []2  []3+                                                                                                              Review of results from each unique test                                                                                                               [] 1 []2  [] 3+                                                                                                              Order of each unique test                                                                                                               [] 1 []2  [] 3+                                                                                                              Or                                                                                                             [] Assessment requiring an independent historian      4            [] One or more chronic illness with exacerbation,              [] Moderate                                                                      [] Moderate                 Progression, or side effects of treatment                            -test documents or independent historians                        Prescription drug management                []  2 or more sable chronic illnesses                                    [] Independent interpretation of tests                              Minor surgery with identifiable risk                [] 1  undiagnosed new problem with uncertain prognosis    [] Discussion or management of test results                    elective major surgery                [] 1 acute illness with                systemic symptoms                                                                                                                                                              [] 1 acute complicated injury                                                                                                                                          Elective major surgery                                                                                                                                                                                                                                                                                                                                                                                                  5            [x] 1 or more chronic illnesses with severe exacerbation,     [x] Extensive(two from below)                                         [x] High                                                                                                               [] Independent interpretation of results                         Drug therapy requiring intensive                                                                                                               []Discussion of management or test interpretation           monitoring                                                                                                                                                                                                       Decision to de-escalate care                 [] 1 acute or chronic illness or injury that poses a threat                                                                                               Decision regarding hospitalization

## 2023-07-27 RX ORDER — IPRATROPIUM BROMIDE AND ALBUTEROL SULFATE 2.5; .5 MG/3ML; MG/3ML
SOLUTION RESPIRATORY (INHALATION)
Qty: 1080 ML | Refills: 3 | Status: SHIPPED | OUTPATIENT
Start: 2023-07-27

## 2023-08-10 ENCOUNTER — PATIENT OUTREACH (OUTPATIENT)
Dept: ADMINISTRATIVE | Facility: HOSPITAL | Age: 63
End: 2023-08-10
Payer: COMMERCIAL

## 2023-08-10 ENCOUNTER — TELEPHONE (OUTPATIENT)
Dept: INTERNAL MEDICINE | Facility: CLINIC | Age: 63
End: 2023-08-10
Payer: COMMERCIAL

## 2023-08-10 NOTE — TELEPHONE ENCOUNTER
"Spoke with [facility]; she stated they can not accept a verbal order over the phone; she's asking for PCP to write on a "prescription pad" or make a note saying "Discontinue of Oxygen" & fax it over /LD    Fax # [phone]   "

## 2023-08-10 NOTE — PROGRESS NOTES
Working HTN report:     Called to discuss scheduling appointment and to get updated BP reading.  Remote BP given 141/80  Offered NV. Declined at this time.

## 2023-08-23 ENCOUNTER — PATIENT OUTREACH (OUTPATIENT)
Dept: ADMINISTRATIVE | Facility: HOSPITAL | Age: 63
End: 2023-08-23
Payer: COMMERCIAL

## 2023-09-21 ENCOUNTER — TELEPHONE (OUTPATIENT)
Dept: PULMONOLOGY | Facility: CLINIC | Age: 63
End: 2023-09-21
Payer: COMMERCIAL

## 2023-09-21 NOTE — TELEPHONE ENCOUNTER
----- Message from Mira Gonzales sent at 9/21/2023 11:20 AM CDT -----  Name of Who is Calling: Cec[facility] in [facility]        What is the request in detail: Called in regards to checking the status of surgery clearance that was sent over to the office via fax. Please advise       Can the clinic reply by [facility]:NO         [facility] [phone] ext [phone]

## 2023-10-03 ENCOUNTER — TELEPHONE (OUTPATIENT)
Dept: ALLERGY | Facility: CLINIC | Age: 63
End: 2023-10-03
Payer: COMMERCIAL

## 2023-10-03 NOTE — TELEPHONE ENCOUNTER
----- Message from Xi Cheema sent at 10/3/2023 11:49 AM CDT -----  Contact: Mayito/[facility]  Mayito is calling in regards to getting an prescription order on Privigen 10mg vol.please call back at [phone] option 2 and fax [phone]          Thanks  J

## 2023-10-03 NOTE — TELEPHONE ENCOUNTER
Spoke with Lisa at pharmacy. She states they last shipped Privigen in April 2023.  I advised them that pt is now infusing at [facility]"" and not [facility].

## 2023-10-09 ENCOUNTER — TELEPHONE (OUTPATIENT)
Dept: ALLERGY | Facility: CLINIC | Age: 63
End: 2023-10-09
Payer: COMMERCIAL

## 2023-10-09 NOTE — TELEPHONE ENCOUNTER
----- Message from Jody Rios sent at 10/9/2023  1:56 PM CDT -----  .Type:  Pharmacy Calling to Clarify an RX    Name of Caller:Annabelle  Pharmacy Name:[facility]  Prescription Name: Privigen  What do they need to clarify?:  Best Call Back Number:[phone]  Additional Information: 10/3/2023 Dimple called and left message stating the patient would be infusing at a different clinic and wouldn't need medicine. On 10/04/2023 a form was returned, so she isn't sure if they should service the patient.

## 2023-10-09 NOTE — TELEPHONE ENCOUNTER
Dimple , I spoke to [facility] pharmacy and read them what you wrote in the chart 10/3/23. They would like for you to call them upon return.

## 2023-10-19 ENCOUNTER — OFFICE VISIT (OUTPATIENT)
Dept: ALLERGY | Facility: CLINIC | Age: 63
End: 2023-10-19
Payer: COMMERCIAL

## 2023-10-19 VITALS
SYSTOLIC BLOOD PRESSURE: 149 MMHG | HEART RATE: 72 BPM | TEMPERATURE: 98 F | DIASTOLIC BLOOD PRESSURE: 89 MMHG | WEIGHT: 145.5 LBS | BODY MASS INDEX: 24.21 KG/M2

## 2023-10-19 DIAGNOSIS — E83.119 HEMOCHROMATOSIS, UNSPECIFIED HEMOCHROMATOSIS TYPE: ICD-10-CM

## 2023-10-19 DIAGNOSIS — D80.6 SPECIFIC ANTIBODY DEFICIENCY WITH NORMAL IG CONCENTRATION AND NORMAL NUMBER OF B CELLS: ICD-10-CM

## 2023-10-19 DIAGNOSIS — J32.9 RECURRENT SINUSITIS: ICD-10-CM

## 2023-10-19 DIAGNOSIS — R53.83 MALAISE AND FATIGUE: ICD-10-CM

## 2023-10-19 DIAGNOSIS — J40 BRONCHITIS: ICD-10-CM

## 2023-10-19 DIAGNOSIS — J44.89 ASTHMA-COPD OVERLAP SYNDROME: ICD-10-CM

## 2023-10-19 DIAGNOSIS — D83.9 CVID (COMMON VARIABLE IMMUNODEFICIENCY): Primary | ICD-10-CM

## 2023-10-19 DIAGNOSIS — R53.81 MALAISE AND FATIGUE: ICD-10-CM

## 2023-10-19 PROCEDURE — 99999 PR PBB SHADOW E&M-EST. PATIENT-LVL IV: ICD-10-PCS | Mod: PBBFAC,,, | Performed by: SPECIALIST

## 2023-10-19 PROCEDURE — 1160F PR REVIEW ALL MEDS BY PRESCRIBER/CLIN PHARMACIST DOCUMENTED: ICD-10-PCS | Mod: CPTII,S$GLB,, | Performed by: SPECIALIST

## 2023-10-19 PROCEDURE — 1160F RVW MEDS BY RX/DR IN RCRD: CPT | Mod: CPTII,S$GLB,, | Performed by: SPECIALIST

## 2023-10-19 PROCEDURE — 3077F PR MOST RECENT SYSTOLIC BLOOD PRESSURE >= 140 MM HG: ICD-10-PCS | Mod: CPTII,S$GLB,, | Performed by: SPECIALIST

## 2023-10-19 PROCEDURE — 3008F PR BODY MASS INDEX (BMI) DOCUMENTED: ICD-10-PCS | Mod: CPTII,S$GLB,, | Performed by: SPECIALIST

## 2023-10-19 PROCEDURE — 99214 PR OFFICE/OUTPT VISIT, EST, LEVL IV, 30-39 MIN: ICD-10-PCS | Mod: S$GLB,,, | Performed by: SPECIALIST

## 2023-10-19 PROCEDURE — 3044F PR MOST RECENT HEMOGLOBIN A1C LEVEL <7.0%: ICD-10-PCS | Mod: CPTII,S$GLB,, | Performed by: SPECIALIST

## 2023-10-19 PROCEDURE — 3079F PR MOST RECENT DIASTOLIC BLOOD PRESSURE 80-89 MM HG: ICD-10-PCS | Mod: CPTII,S$GLB,, | Performed by: SPECIALIST

## 2023-10-19 PROCEDURE — 3079F DIAST BP 80-89 MM HG: CPT | Mod: CPTII,S$GLB,, | Performed by: SPECIALIST

## 2023-10-19 PROCEDURE — 3008F BODY MASS INDEX DOCD: CPT | Mod: CPTII,S$GLB,, | Performed by: SPECIALIST

## 2023-10-19 PROCEDURE — 1159F PR MEDICATION LIST DOCUMENTED IN MEDICAL RECORD: ICD-10-PCS | Mod: CPTII,S$GLB,, | Performed by: SPECIALIST

## 2023-10-19 PROCEDURE — 3044F HG A1C LEVEL LT 7.0%: CPT | Mod: CPTII,S$GLB,, | Performed by: SPECIALIST

## 2023-10-19 PROCEDURE — 99999 PR PBB SHADOW E&M-EST. PATIENT-LVL IV: CPT | Mod: PBBFAC,,, | Performed by: SPECIALIST

## 2023-10-19 PROCEDURE — 99214 OFFICE O/P EST MOD 30 MIN: CPT | Mod: S$GLB,,, | Performed by: SPECIALIST

## 2023-10-19 PROCEDURE — 3077F SYST BP >= 140 MM HG: CPT | Mod: CPTII,S$GLB,, | Performed by: SPECIALIST

## 2023-10-19 PROCEDURE — 1159F MED LIST DOCD IN RCRD: CPT | Mod: CPTII,S$GLB,, | Performed by: SPECIALIST

## 2023-10-19 NOTE — PROGRESS NOTES
Subjective:       Patient ID: María Julian is a 63 y.o. female.    Chief Complaint:  Follow-up (Pre iv)  FOLLOW UP ON CVID / SAD- NI- AND RECURRENT SINO- BRONCHITIS , ASTHMA- COPD AND ALLERGIC RHINITIS AND HEMOCHROMATOSIS    HPI:   female 63 year old with  the Primary Immune Deficiency Disease CVID/ SAD- NI-- Specific antibody Deficiency with Normal Immunoglobulins- for follow up.    She was diagnosed about 15 years ago. When antibiotic prophylaxis failed, she was started on monthly IVIG . Doing great on antibody replacement . It has cut down the number of infections and improved the quality of her life.  Breakthrough infections are promptly treated - Trough IgG levels are monitored periodically and she is being followed for short and long term sequelae of CVID.    Per prior EVALUATION SHE HAS ALLERGIES TO COMMON INDOOR AND OUTDOOR AERO ALLERGENS. In the past she diad allergen IT / SCIT with great benefit. She is well versd with asthma triggers and environmental control measures.  She has asthma- COPD overlap that is treated with Trelegy Ellipta 200/ 62.5 / 25-- with benefit. She has Proair HFA for symptoms relief.   She is an ex cigarette smoker.  For hemochromatosis, María follows up with her hematologist.  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 No ongoing allergens or irritants exposure    She also has Asthma- COPD overlap, Perennial Allergic Rhinitis, Recurrent Sino- bronchitis--     Outpatient Medications Marked as Taking for the 10/19/23 encounter (Office Visit) with Dez Galindo MD   Medication Sig Dispense Refill    albuterol (PROAIR HFA) 90 mcg/actuation inhaler Inhale 2 puffs into the lungs every 6 (six) hours as needed. Rescue 18 g 5    albuterol-ipratropium (DUO-NEB) 2.5 mg-0.5 mg/3 mL nebulizer solution USE 1 VIAL VIA NEBULIZER EVERY 6 HOURS AS NEEDED FOR WHEEZING OR SHORTNESS OF BREATH OR RESCUE 1080 mL 3    amLODIPine (NORVASC) 2.5 MG tablet Take 1 tablet (2.5 mg  total) by mouth once daily. 30 tablet 11    benzonatate (TESSALON) 100 MG capsule Take 100 mg by mouth 3 (three) times daily.      budesonide (PULMICORT) 0.5 mg/2 mL nebulizer solution USE 2 ML(0.5 MG) VIA NEBULIZER TWICE DAILY 120 mL 2    ciclopirox (PENLAC) 8 % Soln Apply topically once daily. To the toenails. 6.6 mL 3    diphenhydrAMINE (BENADRYL) 25 mg capsule Take 25 mg by mouth. Before infusions      fluconazole (DIFLUCAN) 150 MG Tab Take one tablet and repeat dose in 3 days 2 tablet 1    gabapentin (NEURONTIN) 300 MG capsule Take 300 mg by mouth every evening.       hydroCHLOROthiazide (HYDRODIURIL) 12.5 MG Tab TAKE 1 TABLET EVERY DAY 90 tablet 3    hydrocodone-chlorpheniramine (TUSSIONEX) 10-8 mg/5 mL suspension Take 5 mLs by mouth 2 (two) times daily as needed.      Immune Globulin G, IGG,-PRO-IGA 10 % injection, Privigen, (PRIVIGEN) 10 % Soln Inject 300 mLs (30 g total) into the vein every 28 days. 300 mL 11    metFORMIN (GLUCOPHAGE) 500 MG tablet Take 1 tablet (500 mg total) by mouth daily with breakfast. 90 tablet 3    potassium chloride SA (K-DUR,KLOR-CON) 20 MEQ tablet Take 20 mEq by mouth.      predniSONE (DELTASONE) 10 MG tablet TAKE 1 TABLET BY MOUTH EVERY DAY AS NEEDED 30 tablet 0    TRELEGY ELLIPTA 200-62.5-25 mcg inhaler INHALE 1 PUFF INTO THE LUNGS EVERY DAY 60 each 7        Erythromycin, Macrolide antibiotics, Iodine and iodide containing products, and Levofloxacin     Past Medical History:   Diagnosis Date    Abnormal Pap smear of cervix     Allergy     Asthma     COPD (chronic obstructive pulmonary disease)     Malignant hyperthermia     pt's mother has hx of MH.  Pt denies any complications with general anesthesia    Recurrent upper respiratory infection (URI)        Family History   Problem Relation Age of Onset    Malignant hypertension Mother     Heart disease Mother     Cancer Father         Liver cancer    Diabetes Father     Ovarian cancer Sister     Alcohol abuse Sister     Early  death Sister        Environmental History: Dust Mite Controls: Dust mite controls are already in place.     Review of Systems    Objective:     Visit Vitals  BP (!) 149/89   Pulse 72   Temp 97.9 °F (36.6 °C)   Wt 66 kg (145 lb 8.1 oz)   BMI 24.21 kg/m²       Physical Exam      Assessment:      1. CVID (common variable immunodeficiency)    2. 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 Recurrent sinusitis    4. Bronchitis    5. Malaise and fatigue    6. Hemochromatosis, unspecified hemochromatosis type    7. Asthma-COPD overlap syndrome        Plan:     Monthly IVIG - pRIVIGEN 10 % - 30 GRAMS- 300 ML - TO BE INFUSED OVER 4 HOURS.  mONITOR FOR SEQUELAE OF cvid AND TROUGH iGg LEVELS Q 4 months.  Trelegy Ellipta 200/ 62.5/ 25-- one puff daily.  Proair HFA 90 mcg 2 puffs tid prn.  As needed nebulized Duoneb and budesonide 0.50 mg bid prn.  Had influenza vaccine.  AREXVY- RSV VACCINE.  Treat all infections.  Had 3 doses of COVID vaccine  No longer on SCIT.  RE EMPHASIZED ENVIRONMENTAL CONTROL MEASURES.  FOLLOW UP WITH HEMATOLOGIST AND PCP-- to treat hemochromatosis                  Problems Address                                                 Amount and/or Complexity                                                                      Risk       3           [] 2 or more self-limited or minor problems                      [] Limited                                                                        [] Low                  [] 1 stable chronic illness                                                  Any combination of the two                                               OTC drugs                  []Acute, uncomplicated illness or injury                            Review of prior external notes from unique source           Minor surgery with no risk factors                                                                                                               [] 1  []2  []3+                                                                                                              Review of results from each unique test                                                                                                               [] 1 []2  [] 3+                                                                                                              Order of each unique test                                                                                                               [] 1 []2  [] 3+                                                                                                              Or                                                                                                             [] Assessment requiring an independent historian      4            [x] One or more chronic illness with exacerbation,              [x] Moderate                                                                      [x] Moderate                 Progression, or side effects of treatment                            -test documents or independent historians                        Prescription drug management                [x]  2 or more sable chronic illnesses                                    [] Independent interpretation of tests                              Minor surgery with identifiable risk                [] 1 undiagnosed new problem with uncertain prognosis    [] Discussion or management of test results                    elective major surgery                [] 1 acute illness with                systemic symptoms                                                                                                                                                              [] 1 acute complicated injury                                                                                                                                          Elective major surgery                                                                                                                                                                                                                                                                                                                                                                                                   5            [] 1 or more chronic illnesses with severe exacerbation,     [] Extensive(two from below)                                         [] High                                                                                                               [] Independent interpretation of results                         Drug therapy requiring intensive                                                                                                               []Discussion of management or test interpretation           monitoring                                                                                                                                                                                                       Decision to de-escalate care                 [] 1 acute or chronic illness or injury that poses a threat                                                                                               Decision regarding hospitalization

## 2023-10-31 ENCOUNTER — OFFICE VISIT (OUTPATIENT)
Dept: OBSTETRICS AND GYNECOLOGY | Facility: CLINIC | Age: 63
End: 2023-10-31
Payer: COMMERCIAL

## 2023-10-31 VITALS
BODY MASS INDEX: 23.74 KG/M2 | DIASTOLIC BLOOD PRESSURE: 80 MMHG | SYSTOLIC BLOOD PRESSURE: 130 MMHG | WEIGHT: 142.63 LBS

## 2023-10-31 DIAGNOSIS — D07.1 VIN III (VULVAR INTRAEPITHELIAL NEOPLASIA III): ICD-10-CM

## 2023-10-31 DIAGNOSIS — Z01.419 ENCOUNTER FOR GYNECOLOGICAL EXAMINATION WITHOUT ABNORMAL FINDING: Primary | ICD-10-CM

## 2023-10-31 DIAGNOSIS — A63.0 CONDYLOMA: ICD-10-CM

## 2023-10-31 DIAGNOSIS — N89.8 VAGINAL DISCHARGE: ICD-10-CM

## 2023-10-31 PROCEDURE — 99396 PR PREVENTIVE VISIT,EST,40-64: ICD-10-PCS | Mod: S$GLB,,, | Performed by: NURSE PRACTITIONER

## 2023-10-31 PROCEDURE — 3075F PR MOST RECENT SYSTOLIC BLOOD PRESS GE 130-139MM HG: ICD-10-PCS | Mod: CPTII,S$GLB,, | Performed by: NURSE PRACTITIONER

## 2023-10-31 PROCEDURE — 3044F HG A1C LEVEL LT 7.0%: CPT | Mod: CPTII,S$GLB,, | Performed by: NURSE PRACTITIONER

## 2023-10-31 PROCEDURE — 99396 PREV VISIT EST AGE 40-64: CPT | Mod: S$GLB,,, | Performed by: NURSE PRACTITIONER

## 2023-10-31 PROCEDURE — 3044F PR MOST RECENT HEMOGLOBIN A1C LEVEL <7.0%: ICD-10-PCS | Mod: CPTII,S$GLB,, | Performed by: NURSE PRACTITIONER

## 2023-10-31 PROCEDURE — 99999 PR PBB SHADOW E&M-EST. PATIENT-LVL IV: CPT | Mod: PBBFAC,,, | Performed by: NURSE PRACTITIONER

## 2023-10-31 PROCEDURE — 3075F SYST BP GE 130 - 139MM HG: CPT | Mod: CPTII,S$GLB,, | Performed by: NURSE PRACTITIONER

## 2023-10-31 PROCEDURE — 1159F MED LIST DOCD IN RCRD: CPT | Mod: CPTII,S$GLB,, | Performed by: NURSE PRACTITIONER

## 2023-10-31 PROCEDURE — 87624 HPV HI-RISK TYP POOLED RSLT: CPT | Performed by: NURSE PRACTITIONER

## 2023-10-31 PROCEDURE — 1160F PR REVIEW ALL MEDS BY PRESCRIBER/CLIN PHARMACIST DOCUMENTED: ICD-10-PCS | Mod: CPTII,S$GLB,, | Performed by: NURSE PRACTITIONER

## 2023-10-31 PROCEDURE — 1159F PR MEDICATION LIST DOCUMENTED IN MEDICAL RECORD: ICD-10-PCS | Mod: CPTII,S$GLB,, | Performed by: NURSE PRACTITIONER

## 2023-10-31 PROCEDURE — 81514 NFCT DS BV&VAGINITIS DNA ALG: CPT | Performed by: NURSE PRACTITIONER

## 2023-10-31 PROCEDURE — 1160F RVW MEDS BY RX/DR IN RCRD: CPT | Mod: CPTII,S$GLB,, | Performed by: NURSE PRACTITIONER

## 2023-10-31 PROCEDURE — 99999 PR PBB SHADOW E&M-EST. PATIENT-LVL IV: ICD-10-PCS | Mod: PBBFAC,,, | Performed by: NURSE PRACTITIONER

## 2023-10-31 PROCEDURE — 88141 CYTOPATH C/V INTERPRET: CPT | Mod: ,,, | Performed by: PATHOLOGY

## 2023-10-31 PROCEDURE — 3079F DIAST BP 80-89 MM HG: CPT | Mod: CPTII,S$GLB,, | Performed by: NURSE PRACTITIONER

## 2023-10-31 PROCEDURE — 88141 PR  CYTOPATH CERV/VAG INTERPRET: ICD-10-PCS | Mod: ,,, | Performed by: PATHOLOGY

## 2023-10-31 PROCEDURE — 88175 CYTOPATH C/V AUTO FLUID REDO: CPT | Performed by: PATHOLOGY

## 2023-10-31 PROCEDURE — 3079F PR MOST RECENT DIASTOLIC BLOOD PRESSURE 80-89 MM HG: ICD-10-PCS | Mod: CPTII,S$GLB,, | Performed by: NURSE PRACTITIONER

## 2023-10-31 PROCEDURE — 3008F BODY MASS INDEX DOCD: CPT | Mod: CPTII,S$GLB,, | Performed by: NURSE PRACTITIONER

## 2023-10-31 PROCEDURE — 3008F PR BODY MASS INDEX (BMI) DOCUMENTED: ICD-10-PCS | Mod: CPTII,S$GLB,, | Performed by: NURSE PRACTITIONER

## 2023-10-31 NOTE — PROGRESS NOTES
CC: Well woman exam    María Julian is a 63 y.o. female  presents for a well woman exam.    Mmg normal 3/2023  Pt may be feeling some lesions in vaginal area but not sure  Pt with hx of condyloma and CELENA III     Pt has autoimmune IgG  issues - gets infusions    Past Medical History:   Diagnosis Date    Abnormal Pap smear of cervix     Allergy     Asthma     COPD (chronic obstructive pulmonary disease)     Malignant hyperthermia     pt's mother has hx of MH.  Pt denies any complications with general anesthesia    Recurrent upper respiratory infection (URI)      Past Surgical History:   Procedure Laterality Date    BONE SPUR EXCISION SHOULDER Right     CARPAL TUNNEL RELEASE Left      SECTION      x 2    CHOLECYSTECTOMY  2018    DILATION AND CURETTAGE OF UTERUS      EXAMINATION UNDER ANESTHESIA N/A 2021    Procedure: Exam Under Anesthesia;  Surgeon: Don Allen MD;  Location: [facility] OR;  Service: Oncology;  Laterality: N/A;    FOOT TENDON SURGERY Bilateral     HYSTERECTOMY      fibroid    INJECTION OF ANESTHETIC AGENT AROUND MEDIAL BRANCH NERVES INNERVATING LUMBAR FACET JOINT Right 10/28/2021    Procedure: Block-nerve-medial branch-lumbar Right L3, 4,5 MBB with RN IV sedation;  Surgeon: Lei Copeland MD;  Location: [facility] PAIN MGT;  Service: Pain Management;  Laterality: Right;    INJECTION OF ANESTHETIC AGENT AROUND MEDIAL BRANCH NERVES INNERVATING LUMBAR FACET JOINT Bilateral 2021    Procedure: Block-nerve-medial branch-lumbar bilateral L3, 4,5 MBB RN IV sedation;  Surgeon: Lei Copeland MD;  Location: [facility] PAIN MGT;  Service: Pain Management;  Laterality: Bilateral;    INJECTION OF ANESTHETIC AGENT AROUND MEDIAL BRANCH NERVES INNERVATING LUMBAR FACET JOINT Bilateral 3/14/2022    Procedure: Block-nerve-medial branch-lumbar L3, 4 5 NO STEROIDS RN IV sedation;  Surgeon: Lei Copeland MD;  Location: [facility] PAIN MGT;  Service: Pain Management;  Laterality: Bilateral;    INJECTION OF  ANESTHETIC AGENT AROUND MEDIAL BRANCH NERVES INNERVATING LUMBAR FACET JOINT Bilateral 3/21/2022    Procedure: Block-nerve-medial branch-lumbar bilateral L3, 4,5 RN IV sedation NO STERIOD DIAGNOSTIC ONLY;  Surgeon: Lei Copeland MD;  Location: [facility] PAIN MGT;  Service: Pain Management;  Laterality: Bilateral;    LASER ABLATION N/A 2021    Procedure: ABLATION, USING LASER;  Surgeon: Don Allen MD;  Location: [facility] OR;  Service: Oncology;  Laterality: N/A;    MICRODISCECTOMY OF SPINE      OOPHORECTOMY Bilateral     RADIOFREQUENCY THERMOCOAGULATION Bilateral 2022    Procedure: RADIOFREQUENCY THERMAL COAGULATION bilateral L3, 4,5 mbb RFA RN IV sedation;  Surgeon: Lei Copeland MD;  Location: [facility] PAIN MGT;  Service: Pain Management;  Laterality: Bilateral;    TONSILLECTOMY      TRIGGER FINGER RELEASE Left     TUBAL LIGATION Bilateral      Family History   Problem Relation Age of Onset    Malignant hypertension Mother     Heart disease Mother     Cancer Father         Liver cancer    Diabetes Father     Ovarian cancer Sister     Alcohol abuse Sister     Early death Sister      Social History     Tobacco Use    Smoking status: Former     Current packs/day: 0.00     Average packs/day: 1 pack/day for 43.7 years (43.7 ttl pk-yrs)     Types: Cigarettes     Start date: 1975     Quit date: 2018     Years since quittin.1    Smokeless tobacco: Never   Substance Use Topics    Alcohol use: Yes     Alcohol/week: 7.0 standard drinks of alcohol     Types: 7 Drinks containing 0.5 oz of alcohol per week     Comment: socially  No alcohol 72h prior to sx    Drug use: Never     OB History          3    Para   2    Term   2            AB   1    Living   2         SAB        IAB        Ectopic        Multiple        Live Births                     /80   Wt 64.7 kg (142 lb 10.2 oz)   BMI 23.74 kg/m²     ROS:  GENERAL: Denies weight gain or weight loss. Feeling well overall.   SKIN: Denies rash  or lesions.   HEAD: Denies head injury or headache.   NODES: Denies enlarged lymph nodes.   CHEST: Denies chest pain or shortness of breath.   CARDIOVASCULAR: Denies palpitations or left sided chest pain.   ABDOMEN: No abdominal pain, constipation, diarrhea, nausea, vomiting or rectal bleeding.   URINARY: No frequency, dysuria, hematuria, or burning on urination.  REPRODUCTIVE: See HPI.   BREASTS: The patient performs breast self-examination and denies pain, lumps, or nipple discharge.   HEMATOLOGIC: No easy bruisability or excessive bleeding.   MUSCULOSKELETAL: Denies joint pain or swelling.   NEUROLOGIC: Denies syncope or weakness.   PSYCHIATRIC: Denies depression, anxiety or mood swings.    PE:   APPEARANCE: Well nourished, well developed, in no acute distress.  AFFECT: WNL, alert and oriented x 3.  SKIN: No acne or hirsutism.  NECK: Neck symmetric without masses or thyromegaly.  NODES: No inguinal, cervical, axillary or femoral lymph node enlargement.  CHEST: Good respiratory effort.   ABDOMEN: Soft. No tenderness or masses. No hepatosplenomegaly. No hernias.  BREASTS: deferred - mmg normal  PELVIC: Normal external female genitalia without lesions - NO lesions noted on this exam. . Normal hair distribution. Adequate perineal body, normal urethral meatus. Vagina atrophic without lesions, has heavy thick yellowish discharge. No significant cystocele or rectocele. Bimanual exam shows uterus and cervix to be surgically absent. Adnexa absent   Physical Exam     1. Encounter for gynecological examination without abnormal finding  Liquid-Based Pap Smear, Screening    HPV High Risk Genotypes, PCR      2. Condyloma        3. CELENA III (vulvar intraepithelial neoplasia III)  Liquid-Based Pap Smear, Screening    HPV High Risk Genotypes, PCR      4. Vaginal discharge  Vaginosis Screen by DNA Probe       and PLAN:  Diagnoses and all orders for this visit:    Encounter for gynecological examination without abnormal finding  -      Liquid-Based Pap Smear, Screening  -     HPV High Risk Genotypes, PCR    Condyloma    CELENA III (vulvar intraepithelial neoplasia III)  -     Liquid-Based Pap Smear, Screening  -     HPV High Risk Genotypes, PCR    Vaginal discharge  -     Vaginosis Screen by DNA Probe     Pending pap and culture - see back in 1 year  If feels lesions pt to report sooner   Patient was counseled today on A.C.S. Pap guidelines and recommendations for yearly pelvic exams, mammograms and monthly self breast exams; to see her PCP for other health maintenance.

## 2023-11-01 LAB
BACTERIAL VAGINOSIS DNA: POSITIVE
CANDIDA GLABRATA DNA: NEGATIVE
CANDIDA KRUSEI DNA: NEGATIVE
CANDIDA RRNA VAG QL PROBE: NEGATIVE
T VAGINALIS RRNA GENITAL QL PROBE: NEGATIVE

## 2023-11-03 LAB
HPV HR 12 DNA SPEC QL NAA+PROBE: NEGATIVE
HPV16 AG SPEC QL: NEGATIVE
HPV18 DNA SPEC QL NAA+PROBE: NEGATIVE

## 2023-11-03 RX ORDER — METRONIDAZOLE 500 MG/1
500 TABLET ORAL EVERY 12 HOURS
Qty: 14 TABLET | Refills: 0 | Status: SHIPPED | OUTPATIENT
Start: 2023-11-03 | End: 2023-11-10

## 2023-11-08 LAB
FINAL PATHOLOGIC DIAGNOSIS: NORMAL
Lab: NORMAL

## 2023-11-29 DIAGNOSIS — I10 ESSENTIAL HYPERTENSION: ICD-10-CM

## 2023-11-29 NOTE — TELEPHONE ENCOUNTER
----- Message from Stephanie Frost RN sent at 11/29/2023  4:06 PM CST -----  Regarding: FW: pt meds    ----- Message -----  From: Nara Foster  Sent: 11/29/2023   2:02 PM CST  To: [facility] Clinical Staff  Subject: pt meds                                          Name of Who is Calling:Pt         What is the request in detail: Requesting call back in regards to being previous pt for Dr Dunn. Pt requesting refills for meds that are out please contact.             Can the clinic reply by [employer]:         What Number to Call Back if not in [employer]:Telephone Information:  Mobile          [phone]

## 2023-11-30 RX ORDER — AMLODIPINE BESYLATE 2.5 MG/1
2.5 TABLET ORAL DAILY
Qty: 30 TABLET | Refills: 11 | Status: SHIPPED | OUTPATIENT
Start: 2023-11-30 | End: 2024-01-11

## 2024-01-02 ENCOUNTER — PATIENT MESSAGE (OUTPATIENT)
Dept: ALLERGY | Facility: CLINIC | Age: 64
End: 2024-01-02
Payer: COMMERCIAL

## 2024-01-04 ENCOUNTER — TELEPHONE (OUTPATIENT)
Dept: ALLERGY | Facility: CLINIC | Age: 64
End: 2024-01-04
Payer: COMMERCIAL

## 2024-01-04 DIAGNOSIS — D83.9 CVID (COMMON VARIABLE IMMUNODEFICIENCY): Primary | ICD-10-CM

## 2024-01-04 RX ORDER — AZITHROMYCIN 250 MG/1
TABLET, FILM COATED ORAL
Qty: 11 TABLET | Refills: 0 | Status: SHIPPED | OUTPATIENT
Start: 2024-01-04 | End: 2024-01-14

## 2024-01-11 ENCOUNTER — OFFICE VISIT (OUTPATIENT)
Dept: CARDIOLOGY | Facility: CLINIC | Age: 64
End: 2024-01-11
Payer: COMMERCIAL

## 2024-01-11 VITALS
OXYGEN SATURATION: 98 % | HEIGHT: 65 IN | DIASTOLIC BLOOD PRESSURE: 82 MMHG | WEIGHT: 147.69 LBS | SYSTOLIC BLOOD PRESSURE: 132 MMHG | BODY MASS INDEX: 24.61 KG/M2 | HEART RATE: 73 BPM

## 2024-01-11 DIAGNOSIS — I25.10 CORONARY ARTERY CALCIFICATION SEEN ON CAT SCAN: ICD-10-CM

## 2024-01-11 DIAGNOSIS — J44.9 CHRONIC OBSTRUCTIVE PULMONARY DISEASE, UNSPECIFIED COPD TYPE: ICD-10-CM

## 2024-01-11 DIAGNOSIS — I70.0 ABDOMINAL AORTIC ATHEROSCLEROSIS: Primary | ICD-10-CM

## 2024-01-11 DIAGNOSIS — I25.10 ATHEROSCLEROSIS OF NATIVE CORONARY ARTERY OF NATIVE HEART WITHOUT ANGINA PECTORIS: ICD-10-CM

## 2024-01-11 DIAGNOSIS — Z87.891 EX-SMOKER: ICD-10-CM

## 2024-01-11 DIAGNOSIS — Z53.20 REFUSAL OF STATIN MEDICATION BY PATIENT: ICD-10-CM

## 2024-01-11 DIAGNOSIS — D83.9 CVID (COMMON VARIABLE IMMUNODEFICIENCY): ICD-10-CM

## 2024-01-11 DIAGNOSIS — I10 ESSENTIAL HYPERTENSION: ICD-10-CM

## 2024-01-11 PROCEDURE — 99999 PR PBB SHADOW E&M-EST. PATIENT-LVL IV: CPT | Mod: PBBFAC,,, | Performed by: INTERNAL MEDICINE

## 2024-01-11 PROCEDURE — 99214 OFFICE O/P EST MOD 30 MIN: CPT | Mod: S$GLB,,, | Performed by: INTERNAL MEDICINE

## 2024-01-11 RX ORDER — EZETIMIBE 10 MG/1
10 TABLET ORAL DAILY
Qty: 30 TABLET | Refills: 11 | Status: SHIPPED | OUTPATIENT
Start: 2024-01-11 | End: 2025-01-10

## 2024-01-11 RX ORDER — AMLODIPINE BESYLATE 5 MG/1
5 TABLET ORAL DAILY
Qty: 30 TABLET | Refills: 11 | Status: SHIPPED | OUTPATIENT
Start: 2024-01-11 | End: 2025-01-10

## 2024-01-11 NOTE — PROGRESS NOTES
Subjective:   Patient ID:  María Julian is a 63 y.o. female who presents for evaluation of No chief complaint on file.        HPI     1.11.2024  Had pneumonia after her last visit and couldn't schedule her stress echo   Comes in for six-month follow-up.  Doing well.  States blood pressure sometimes be running mostly higher 130s to 140 at home.  Compliant with medications.  Known to have coronary after sclerosis based on CT scan.  LDL not at goal.  Declines statins.      6.5.2023  Comes in for six-month follow-up.    Denies any chest pain.    No palpitations syncope presyncope   Her lipid panel is elevated.    Her CT scan of the chest showed coronary artery calcification.  However no chest pain  Her LDL is not at goal however she refused to take any statin or Zetia.    Her blood pressure is elevated today however she states at home it is 120/70 however she does not check it often.    She states that she took Sudafed also goes she had a recent upper respiratory infection.        12/05/2022.    Comes in for six-month follow-up.    She states that the Toprol that she was prescribed for palpitations did help with the palpitations however she was feeling very fatigued so she stopped it.    She is still on hydrochlorothiazide.    Her blood pressure runs at home between 120 and 150.    Her blood pressure today on my last check was 160/95.        6.2.2022  Comes in for follow up , overall she is doing better since last visit, since she states her back pain feels better after RFA  She states she has been having higher BP at home and on visit   Her echo didn't show low EF or cardiomyopathy, done since hx of hemochromatosis   Started on hctz last visit with better numbers overall except for some higher   No chest pain, no dyspnea, no palpitations  Also under some work stress       5.2021  62 yo female, ex smoker quit in 2018, 45 pqy, elevated BP without hx of HTN, BP ranges high normal to abnormal   She works in an  office. She uses the stairs every day, 1 flight multiple times a day, without chest pain, dyspnea, syncope, presyncope, dizziness, headaches.   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  Pt denies any complications with general anesthesia    Recurrent upper respiratory infection (URI)        Past Surgical History:   Procedure Laterality Date    BONE SPUR EXCISION SHOULDER Right     CARPAL TUNNEL RELEASE Left      SECTION      x 2    CHOLECYSTECTOMY  2018    DILATION AND CURETTAGE OF UTERUS      EXAMINATION UNDER ANESTHESIA N/A 2021    Procedure: Exam Under Anesthesia;  Surgeon: Don Allen MD;  Location: [facility] OR;  Service: Oncology;  Laterality: N/A;    FOOT TENDON SURGERY Bilateral     HYSTERECTOMY  1999    fibroid    INJECTION OF ANESTHETIC AGENT AROUND MEDIAL BRANCH NERVES INNERVATING LUMBAR FACET JOINT Right 10/28/2021    Procedure: Block-nerve-medial branch-lumbar Right L3, 4,5 MBB with RN IV sedation;  Surgeon: Lei Copeland MD;  Location: [facility] PAIN MGT;  Service: Pain Management;  Laterality: Right;    INJECTION OF ANESTHETIC AGENT AROUND MEDIAL BRANCH NERVES INNERVATING LUMBAR FACET JOINT Bilateral 2021    Procedure: Block-nerve-medial branch-lumbar bilateral L3, 4,5 MBB RN IV sedation;  Surgeon: Lei Copeland MD;  Location: [facility] PAIN MGT;  Service: Pain Management;  Laterality: Bilateral;    INJECTION OF ANESTHETIC AGENT AROUND MEDIAL BRANCH NERVES INNERVATING LUMBAR FACET JOINT Bilateral 3/14/2022    Procedure: Block-nerve-medial branch-lumbar  L3, 4 5 NO STEROIDS RN IV sedation;  Surgeon: Lei Copeland MD;  Location: [facility] PAIN MGT;  Service: Pain Management;  Laterality: Bilateral;    INJECTION OF ANESTHETIC AGENT AROUND MEDIAL BRANCH NERVES INNERVATING LUMBAR FACET JOINT Bilateral 3/21/2022    Procedure: Block-nerve-medial branch-lumbar bilateral L3, 4,5 RN IV sedation NO STERIOD DIAGNOSTIC ONLY;  Surgeon: Lei Copeland MD;  Location: [facility] PAIN MGT;  Service: Pain Management;  Laterality: Bilateral;    LASER ABLATION N/A 2021    Procedure: ABLATION, USING LASER;  Surgeon: Don Allen MD;  Location: [facility] OR;  Service: Oncology;  Laterality: N/A;    MICRODISCECTOMY OF SPINE      OOPHORECTOMY Bilateral     RADIOFREQUENCY THERMOCOAGULATION Bilateral 2022    Procedure: RADIOFREQUENCY THERMAL COAGULATION bilateral L3, 4,5 mbb RFA RN IV sedation;  Surgeon: Lei Copeland MD;  Location: [facility] PAIN MGT;  Service: Pain Management;  Laterality: Bilateral;    TONSILLECTOMY      TRIGGER FINGER RELEASE Left     TUBAL LIGATION Bilateral        Social History     Tobacco Use    Smoking status: Former     Current packs/day: 0.00     Average packs/day: 1 pack/day for 43.7 years (43.7 ttl pk-yrs)     Types: Cigarettes     Start date: 1975     Quit date: 2018     Years since quittin.3    Smokeless tobacco: Never   Substance Use Topics    Alcohol use: Yes     Alcohol/week: 7.0 standard drinks of alcohol     Types: 7 Drinks containing 0.5 oz of alcohol per week     Comment: socially  No alcohol 72h prior to sx    Drug use: Never       Family History   Problem Relation Age of Onset    Malignant hypertension Mother     Heart disease Mother     Cancer Father         Liver cancer    Diabetes Father     Ovarian cancer Sister     Alcohol abuse Sister     Early death Sister        Review of Systems   Cardiovascular:  Negative for chest pain, dyspnea on exertion, palpitations and syncope.   Genitourinary: Negative.    Neurological: Negative.        Current  Outpatient Medications on File Prior to Visit   Medication Sig    albuterol (PROAIR HFA) 90 mcg/actuation inhaler Inhale 2 puffs into the lungs every 6 (six) hours as needed. Rescue    albuterol-ipratropium (DUO-NEB) 2.5 mg-0.5 mg/3 mL nebulizer solution USE 1 VIAL VIA NEBULIZER EVERY 6 HOURS AS NEEDED FOR WHEEZING OR SHORTNESS OF BREATH OR RESCUE    azithromycin (Z-KARINA) 250 MG tablet Take two tablets as one dose on day one then one tablet once daily for 9 more days    budesonide (PULMICORT) 0.5 mg/2 mL nebulizer solution USE 2 ML(0.5 MG) VIA NEBULIZER TWICE DAILY    diphenhydrAMINE (BENADRYL) 25 mg capsule Take 25 mg by mouth. Before infusions    gabapentin (NEURONTIN) 300 MG capsule Take 300 mg by mouth every evening.     hydroCHLOROthiazide (HYDRODIURIL) 12.5 MG Tab TAKE 1 TABLET EVERY DAY    Immune Globulin G, IGG,-PRO-IGA 10 % injection, Privigen, (PRIVIGEN) 10 % Soln Inject 300 mLs (30 g total) into the vein every 28 days.    metFORMIN (GLUCOPHAGE) 500 MG tablet Take 1 tablet (500 mg total) by mouth daily with breakfast.    predniSONE (DELTASONE) 10 MG tablet TAKE 1 TABLET BY MOUTH EVERY DAY AS NEEDED    TRELEGY ELLIPTA 200-62.5-25 mcg inhaler INHALE 1 PUFF INTO THE LUNGS EVERY DAY    [DISCONTINUED] amLODIPine (NORVASC) 2.5 MG tablet Take 1 tablet (2.5 mg total) by mouth once daily.    benzonatate (TESSALON) 100 MG capsule Take 100 mg by mouth 3 (three) times daily.    ciclopirox (PENLAC) 8 % Soln Apply topically once daily. To the toenails.    fluconazole (DIFLUCAN) 150 MG Tab Take one tablet and repeat dose in 3 days    hydrocodone-chlorpheniramine (TUSSIONEX) 10-8 mg/5 mL suspension Take 5 mLs by mouth 2 (two) times daily as needed.    potassium chloride SA (K-DUR,KLOR-CON) 20 MEQ tablet Take 20 mEq by mouth.     No current facility-administered medications on file prior to visit.       Objective:   Objective:  Wt Readings from Last 3 Encounters:   01/11/24 67 kg (147 lb 11.3 oz)   10/31/23 64.7 kg (142 lb  10.2 oz)   10/19/23 66 kg (145 lb 8.1 oz)     Temp Readings from Last 3 Encounters:   10/19/23 97.9 °F (36.6 °C)   07/26/23 98.1 °F (36.7 °C)   07/03/23 98 °F (36.7 °C) (Tympanic)     BP Readings from Last 3 Encounters:   01/11/24 132/82   10/31/23 130/80   10/19/23 (!) 149/89     Pulse Readings from Last 3 Encounters:   01/11/24 73   10/19/23 72   07/26/23 71       Physical Exam  Vitals reviewed.   Constitutional:       Appearance: She is well-developed.   Neck:      Vascular: No carotid bruit.   Cardiovascular:      Rate and Rhythm: Normal rate and regular rhythm.      Pulses: Intact distal pulses.      Heart sounds: Normal heart sounds. No murmur heard.  Pulmonary:      Breath sounds: Normal breath sounds.   Neurological:      Mental Status: She is oriented to person, place, and time.         Lab Results   Component Value Date    CHOL 160 04/24/2023    CHOL 197 04/14/2022    CHOL 174 03/12/2021     Lab Results   Component Value Date    HDL 52 04/24/2023    HDL 64 04/14/2022    HDL 51 03/12/2021     Lab Results   Component Value Date    LDLCALC 93.8 04/24/2023    LDLCALC 115.6 04/14/2022    LDLCALC 102.4 03/12/2021     Lab Results   Component Value Date    TRIG 71 04/24/2023    TRIG 87 04/14/2022    TRIG 103 03/12/2021     Lab Results   Component Value Date    CHOLHDL 32.5 04/24/2023    CHOLHDL 32.5 04/14/2022    CHOLHDL 29.3 03/12/2021       Chemistry        Component Value Date/Time     07/03/2023 1600    K 4.1 07/03/2023 1600     07/03/2023 1600    CO2 23 07/03/2023 1600    BUN 12 07/03/2023 1600    CREATININE 0.8 07/03/2023 1600     07/03/2023 1600        Component Value Date/Time    CALCIUM 9.3 07/03/2023 1600    ALKPHOS 76 04/24/2023 1140    AST 17 04/24/2023 1140    ALT 9 (L) 04/24/2023 1140    BILITOT 0.7 04/24/2023 1140    ESTGFRAFRICA >60.0 04/14/2022 0833    EGFRNONAA >60.0 04/14/2022 0833          Lab Results   Component Value Date    TSH 1.516 04/24/2023     No results found for:  ""INR", "PROTIME"  Lab Results   Component Value Date    WBC 9.49 07/03/2023    HGB 14.5 07/03/2023    HCT 42.1 07/03/2023     (H) 07/03/2023     07/03/2023     BNP  @LABRCNTIP(BNP,BNPTRIAGEBLO)@  CrCl cannot be calculated (Patient's most recent lab result is older than the maximum 7 days allowed.).     Imaging:  ======  No results found for this or any previous visit.    No results found for this or any previous visit.    No results found for this or any previous visit.    No results found for this or any previous visit.  Results for orders placed during the hospital encounter of 06/15/21    Echo    Interpretation Summary  · The left ventricle is normal in size with normal systolic function.  · The estimated ejection fraction is 65%.  · Normal left ventricular diastolic function.  · Normal right ventricular size with normal right ventricular systolic function.  · The ascending aorta is mildly dilated.  · The sinuses of Valsalva is mildly dilated.  · Mild mitral regurgitation.  · Mild aortic regurgitation.      Diagnostic Results:  ECG: Reviewed, no dynamic changes , no evidence of prior MI   Sinus arrhyhtmia, left axis deviation     The 10-year ASCVD risk score (Vinh DK, et al., 2019) is: 5.8%    Values used to calculate the score:      Age: 63 years      Sex: Female      Is Non- : No      Diabetic: No      Tobacco smoker: No      Systolic Blood Pressure: 132 mmHg      Is BP treated: Yes      HDL Cholesterol: 52 mg/dL      Total Cholesterol: 160 mg/dL    Assessment and Plan:   Abdominal aortic atherosclerosis    Coronary artery calcification seen on CAT scan  -     Stress Echo Which stress agent will be used? Treadmill Exercise; Color Flow Doppler? No; Future  -     ezetimibe (ZETIA) 10 mg tablet; Take 1 tablet (10 mg total) by mouth once daily.  Dispense: 30 tablet; Refill: 11    Essential hypertension  -     amLODIPine (NORVASC) 5 MG tablet; Take 1 tablet (5 mg total) by " mouth once daily.  Dispense: 30 tablet; Refill: 11    Refusal of statin medication by patient    CVID (common variable immunodeficiency)    Atherosclerosis of native coronary artery of native heart without angina pectoris    Ex-smoker    Chronic obstructive pulmonary disease, unspecified COPD type           Continue HCTZ.  Increase Norvasc.  Attempt Zetia  Did not tolerate Toprol the past.    Continue to exercise.    Low-salt diet.     Blood pressure not at goal however she states this is due to her taking Sudafed today.    Monitor BP at home.    We will get a stress echo given coronary atherosclerosis seen on CT scan however asymptomatic.    Follow up in 6 months

## 2024-01-18 ENCOUNTER — TELEPHONE (OUTPATIENT)
Dept: ALLERGY | Facility: CLINIC | Age: 64
End: 2024-01-18
Payer: COMMERCIAL

## 2024-01-18 ENCOUNTER — PATIENT MESSAGE (OUTPATIENT)
Dept: ALLERGY | Facility: CLINIC | Age: 64
End: 2024-01-18
Payer: COMMERCIAL

## 2024-01-19 NOTE — TELEPHONE ENCOUNTER
Spoke with Qing, Client Service Rep. She states that insurance is going to approve a transition of care for [facility] for up to 90 days to allow approval for [facility] which is in her network to be approved. I advised that we were in the process of appealing pts IVIG through the new facility, [facility]. I will email the appeal letter to her along with copy of denial for her reference.        Keith TRIPLETT at [facility] gave the fax number of [phone] to fax appeal letter to. Must include EOC number and that request is urgent.

## 2024-01-19 NOTE — TELEPHONE ENCOUNTER
----- Message from Aleisha Monteiro sent at 1/18/2024  1:56 PM CST -----  Contact: [employer]  Qing is calling from insurance company to confirm an appointment for patient. Please call back at [phone]          In office until 4:30p central time        Thanks  RAFIQ

## 2024-01-22 ENCOUNTER — TELEPHONE (OUTPATIENT)
Dept: ALLERGY | Facility: CLINIC | Age: 64
End: 2024-01-22
Payer: COMMERCIAL

## 2024-01-22 NOTE — TELEPHONE ENCOUNTER
----- Message from Odell Dominick sent at 1/22/2024  2:27 PM CST -----  Regarding: [employer] [phone]  Type: Patient Call Back    Who called: [employer]    What is the request in detail: called to talk to the nurse in regards to not receiving the email that she was supposed to receive from the nurse.     Can the clinic reply by [facility]? No     Would the patient rather a call back or a response via [facility]? Call back     Best call back number: [phone]    Additional Information:    Thank you.

## 2024-01-24 PROBLEM — D80.6 SPECIFIC ANTIBODY DEFICIENCY WITH NORMAL IG CONCENTRATION AND NORMAL NUMBER OF B CELLS: Status: ACTIVE | Noted: 2024-01-24

## 2024-01-24 NOTE — PROGRESS NOTES
Subjective:       Patient ID: María Julian is a 63 y.o. female.    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 SHE HAD A COURSE OF AUGMENTIN AND PREDNISONE AND ANOTHER COURSE OF AZITHROMYCIN AND IS ON TRELEGY AND NEBULIZED DUONEB AND BUDESONIDE.  --------------------------------------------------------------------------------------------------------------------------------------------------------------------------    female, 63 year old with the above complaints. Her diagnosis of SAD- NI / CVID was re confirmed after expert second opinion consult through Irina Pretty MD, Immunologist at the [facility] 12 years ago.  Since antibiotic prophylaxis failed, María was started on monthly IVIG , 12 years ago with great benefit.    It has cut down the number of infections and improved the quality of her life.  Breakthrough infections are promptly treated.  Per remote work up, María's SPTs revealed allergies to multiple indoor and outdoor aero allergens. She was not styarted on AIT- SCIT OR SLIT. She is on symptoms treatment.  She has chronic cough. She is an ex cigarette smoker and has ASTHMA- COPD- AND IS ON TRELEGY.  NOLONGER SMOKING CIGARETTES. Through David Ochoa MD, PCP, she recently had CT thorax to screen for lung cancer.  Recent colonoscopy revealed 16 polyps- some pre cancerous. Genetics consult was sought.  She is we;; versed with environmental control measures/  María will follow up with her hepatologist in [facility]-- for hemochromatosis.  She is a  for a [employer]    Erythromycin, Macrolide antibiotics, Iodine and iodide containing products, and Levofloxacin -- allergies were reported    Past Medical History:   Diagnosis Date    Abnormal Pap smear of cervix      Allergy     Asthma     COPD (chronic obstructive pulmonary disease)     Malignant hyperthermia     pt's mother has hx of MH.  Pt denies any complications with general anesthesia    Recurrent upper respiratory infection (URI)        Family History   Problem Relation Age of Onset    Malignant hypertension Mother     Heart disease Mother     Cancer Father         Liver cancer    Diabetes Father     Ovarian cancer Sister     Alcohol abuse Sister     Early death Sister        Environmental History: Dust Mite Controls: Dust mite controls are already in place.     Review of Systems   Constitutional:  Positive for fatigue. Negative for fever.   HENT:  Positive for congestion and postnasal drip. Negative for ear pain, rhinorrhea, sinus pressure, sinus pain, sneezing and sore throat.    Eyes: Negative.  Negative for redness and itching.   Respiratory:  Positive for cough and chest tightness. Negative for shortness of breath and wheezing.    Cardiovascular: Negative.  Negative for chest pain.   Gastrointestinal: Negative.  Negative for nausea.   Endocrine: Negative.  Negative for cold intolerance.   Genitourinary: Negative.  Negative for frequency.   Musculoskeletal: Negative.  Negative for myalgias.   Skin: Negative.  Negative for rash.   Allergic/Immunologic: Positive for environmental allergies. Negative for food allergies and immunocompromised state.   Neurological: Negative.  Negative for dizziness and headaches.   Hematological: Negative.  Negative for adenopathy.   Psychiatric/Behavioral: Negative.  Negative for sleep disturbance.      Objective:     There were no vitals taken for this visit.    Physical Exam  Vitals and nursing note reviewed.   Constitutional:       Appearance: Normal appearance. She is normal weight.   HENT:      Head: Normocephalic and atraumatic.      Right Ear: Tympanic membrane, ear canal and external ear normal.      Left Ear: Tympanic membrane, ear canal and external ear normal.      Nose:  Congestion present.      Mouth/Throat:      Mouth: Mucous membranes are moist.      Pharynx: Oropharynx is clear.   Eyes:      Extraocular Movements: Extraocular movements intact.      Conjunctiva/sclera: Conjunctivae normal.      Pupils: Pupils are equal, round, and reactive to light.   Cardiovascular:      Rate and Rhythm: Normal rate and regular rhythm.      Pulses: Normal pulses.      Heart sounds: Normal heart sounds.   Pulmonary:      Effort: Pulmonary effort is normal.      Breath sounds: Normal breath sounds.   Abdominal:      General: Abdomen is flat. Bowel sounds are normal.      Palpations: Abdomen is soft.   Musculoskeletal:         General: Normal range of motion.      Cervical back: Normal range of motion and neck supple.   Skin:     General: Skin is warm and dry.      Capillary Refill: Capillary refill takes less than 2 seconds.   Neurological:      General: No focal deficit present.      Mental Status: She is alert and oriented to person, place, and time. Mental status is at baseline.   Psychiatric:         Mood and Affect: Mood normal.         Behavior: Behavior normal.         Thought Content: Thought content normal.         Judgment: Judgment normal.       Assessment:      1. CVID (common variable immunodeficiency)    2. 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 Recurrent sinusitis    4. Bronchitis    5. Malaise and fatigue    6. Ex-cigarette smoker    7. Hemochromatosis, unspecified hemochromatosis type    8. Asthma-COPD overlap syndrome        Plan:       Monthly Privigen 10 %-- 30 grams- 300 ml to be infused over 4 hours.-  Approved only for 3 more months- Pending pre cert after the Insurance Change at work in January 2024  Trough IgG s have been great 1104  and 1111 mg /dl on July 7 th and August 03, 2023.  Monitor for short and long term sequelae of CVID.  CONSULTED  genitics service Pamela Stephens NP at the [facility]-- Following referral by the GI  Dr Dumont-- for  Genetic evaluation after colonoscopy- Had 16 colonic polyps-- some pre malignant- recommended yearly colonoiscopy  Treat all infections.  Trelegy Ellipta 200 / 62.5 / 25-- one puff daily.  Proair HFA 90 mcg 2 puffs tid.  Nebulized Budesonide 0. 50 mg plus Duoneb- bid prn.  Treat all infections- Augmentin or Zithromax plus as needed Prednisone.  AREXVY vaccine.  Annual influenza vaccinations.  Follow up in 3 months                Problems Address                                                 Amount and/or Complexity                                                                      Risk       3           [] 2 or more self-limited or minor problems                      [] Limited                                                                        [] Low                  [] 1 stable chronic illness                                                  Any combination of the two                                               OTC drugs                  []Acute, uncomplicated illness or injury                            Review of prior external notes from unique source           Minor surgery with no risk factors                                                                                                               [] 1 []2  []3+                                                                                                              Review of results from each unique test                                                                                                               [] 1 []2  [] 3+                                                                                                              Order of each unique test                                                                                                               [] 1 []2  [] 3+                                                                                                              Or                                                                                                              [] Assessment requiring an independent historian      4            [] One or more chronic illness with exacerbation,              [] Moderate                                                                      [] Moderate                 Progression, or side effects of treatment                            -test documents or independent historians                        Prescription drug management                []  2 or more sable chronic illnesses                                    [] Independent interpretation of tests                              Minor surgery with identifiable risk                [] 1 undiagnosed new problem with uncertain prognosis    [] Discussion or management of test results                    elective major surgery                [] 1 acute illness with                systemic symptoms                                                                                                                                                              [] 1 acute complicated injury                                                                                                                                          Elective major surgery                                                                                                                                                                                                                                                                                                                                                                                                  5            [x] 1 or more chronic illnesses with severe exacerbation,     [x] Extensive(two from below)                                         [x] High                                                                                                               [] Independent interpretation of results                          Drug therapy requiring intensive                                                                                                               []Discussion of management or test interpretation           monitoring                                                                                                                                                                                                       Decision to de-escalate care                 [] 1 acute or chronic illness or injury that poses a threat                                                                                               Decision regarding hospitalization

## 2024-01-25 ENCOUNTER — OFFICE VISIT (OUTPATIENT)
Dept: ALLERGY | Facility: CLINIC | Age: 64
End: 2024-01-25
Payer: COMMERCIAL

## 2024-01-25 VITALS
DIASTOLIC BLOOD PRESSURE: 81 MMHG | BODY MASS INDEX: 23.85 KG/M2 | SYSTOLIC BLOOD PRESSURE: 126 MMHG | HEART RATE: 73 BPM | WEIGHT: 143.31 LBS | TEMPERATURE: 98 F

## 2024-01-25 DIAGNOSIS — J44.89 ASTHMA-COPD OVERLAP SYNDROME: ICD-10-CM

## 2024-01-25 DIAGNOSIS — Z87.891 EX-CIGARETTE SMOKER: ICD-10-CM

## 2024-01-25 DIAGNOSIS — D83.9 CVID (COMMON VARIABLE IMMUNODEFICIENCY): Primary | ICD-10-CM

## 2024-01-25 DIAGNOSIS — E83.119 HEMOCHROMATOSIS, UNSPECIFIED HEMOCHROMATOSIS TYPE: ICD-10-CM

## 2024-01-25 DIAGNOSIS — J32.9 RECURRENT SINUSITIS: ICD-10-CM

## 2024-01-25 DIAGNOSIS — D80.6 SPECIFIC ANTIBODY DEFICIENCY WITH NORMAL IG CONCENTRATION AND NORMAL NUMBER OF B CELLS: ICD-10-CM

## 2024-01-25 DIAGNOSIS — J40 BRONCHITIS: ICD-10-CM

## 2024-01-25 DIAGNOSIS — R53.81 MALAISE AND FATIGUE: ICD-10-CM

## 2024-01-25 DIAGNOSIS — R53.83 MALAISE AND FATIGUE: ICD-10-CM

## 2024-01-25 PROCEDURE — 99215 OFFICE O/P EST HI 40 MIN: CPT | Mod: S$GLB,,, | Performed by: SPECIALIST

## 2024-01-25 PROCEDURE — 99999 PR PBB SHADOW E&M-EST. PATIENT-LVL III: CPT | Mod: PBBFAC,,, | Performed by: SPECIALIST

## 2024-01-29 DIAGNOSIS — J45.40 MODERATE PERSISTENT ASTHMA WITHOUT COMPLICATION: ICD-10-CM

## 2024-01-30 RX ORDER — ALBUTEROL SULFATE 90 UG/1
AEROSOL, METERED RESPIRATORY (INHALATION)
Qty: 8.5 G | Refills: 4 | Status: SHIPPED | OUTPATIENT
Start: 2024-01-30

## 2024-02-06 ENCOUNTER — PATIENT MESSAGE (OUTPATIENT)
Dept: INTERNAL MEDICINE | Facility: CLINIC | Age: 64
End: 2024-02-06
Payer: COMMERCIAL

## 2024-02-06 DIAGNOSIS — Z87.891 FORMER SMOKER: Primary | ICD-10-CM

## 2024-02-06 DIAGNOSIS — Z12.2 SCREENING FOR LUNG CANCER: ICD-10-CM

## 2024-02-06 DIAGNOSIS — R91.1 SOLITARY PULMONARY NODULE: ICD-10-CM

## 2024-02-16 ENCOUNTER — HOSPITAL ENCOUNTER (OUTPATIENT)
Dept: RADIOLOGY | Facility: HOSPITAL | Age: 64
Discharge: HOME | End: 2024-02-16
Attending: FAMILY MEDICINE
Payer: COMMERCIAL

## 2024-02-16 DIAGNOSIS — Z87.891 FORMER SMOKER: ICD-10-CM

## 2024-02-16 DIAGNOSIS — R91.1 SOLITARY PULMONARY NODULE: ICD-10-CM

## 2024-02-16 DIAGNOSIS — Z12.2 SCREENING FOR LUNG CANCER: ICD-10-CM

## 2024-02-16 PROCEDURE — 71271 CT THORAX LUNG CANCER SCR C-: CPT | Mod: 26,,, | Performed by: RADIOLOGY

## 2024-02-16 PROCEDURE — 71271 CT THORAX LUNG CANCER SCR C-: CPT | Mod: TC

## 2024-02-19 ENCOUNTER — HOSPITAL ENCOUNTER (OUTPATIENT)
Dept: CARDIOLOGY | Facility: HOSPITAL | Age: 64
Discharge: HOME | End: 2024-02-19
Attending: INTERNAL MEDICINE
Payer: COMMERCIAL

## 2024-02-19 VITALS
SYSTOLIC BLOOD PRESSURE: 152 MMHG | WEIGHT: 143 LBS | BODY MASS INDEX: 23.82 KG/M2 | DIASTOLIC BLOOD PRESSURE: 82 MMHG | HEIGHT: 65 IN

## 2024-02-19 DIAGNOSIS — I25.10 CORONARY ARTERY CALCIFICATION SEEN ON CAT SCAN: ICD-10-CM

## 2024-02-19 LAB
AORTIC ROOT ANNULUS: 3.44 CM
ASCENDING AORTA: 3.83 CM
AV INDEX (PROSTH): 0.77
AV MEAN GRADIENT: 3 MMHG
AV PEAK GRADIENT: 7 MMHG
AV REGURGITATION PRESSURE HALF TIME: 626.85 MS
AV VALVE AREA BY VELOCITY RATIO: 2.45 CM²
AV VALVE AREA: 2.28 CM²
AV VELOCITY RATIO: 0.83
BSA FOR ECHO PROCEDURE: 1.72 M2
CV ECHO LV RWT: 0.52 CM
CV STRESS BASE HR: 64 BPM
DIASTOLIC BLOOD PRESSURE: 82 MMHG
DOP CALC AO PEAK VEL: 1.29 M/S
DOP CALC AO VTI: 29.4 CM
DOP CALC LVOT AREA: 3 CM2
DOP CALC LVOT DIAMETER: 1.94 CM
DOP CALC LVOT PEAK VEL: 1.07 M/S
DOP CALC LVOT STROKE VOLUME: 67.07 CM3
DOP CALC RVOT PEAK VEL: 0.62 M/S
DOP CALC RVOT VTI: 14.1 CM
DOP CALCLVOT PEAK VEL VTI: 22.7 CM
E WAVE DECELERATION TIME: 217.55 MSEC
E/A RATIO: 1.06
E/E' RATIO: 6.35 M/S
ECHO LV POSTERIOR WALL: 0.95 CM (ref 0.6–1.1)
EJECTION FRACTION: 60 %
FRACTIONAL SHORTENING: 31 % (ref 28–44)
INTERVENTRICULAR SEPTUM: 1.17 CM (ref 0.6–1.1)
IVC DIAMETER: 1.79 CM
IVRT: 62.8 MSEC
LA MAJOR: 5.39 CM
LA MINOR: 5.07 CM
LA WIDTH: 3.8 CM
LEFT ATRIUM SIZE: 3.72 CM
LEFT ATRIUM VOLUME INDEX: 36.5 ML/M2
LEFT ATRIUM VOLUME: 62.78 CM3
LEFT INTERNAL DIMENSION IN SYSTOLE: 2.52 CM (ref 2.1–4)
LEFT VENTRICLE DIASTOLIC VOLUME INDEX: 32.66 ML/M2
LEFT VENTRICLE DIASTOLIC VOLUME: 56.17 ML
LEFT VENTRICLE MASS INDEX: 70 G/M2
LEFT VENTRICLE SYSTOLIC VOLUME INDEX: 13.2 ML/M2
LEFT VENTRICLE SYSTOLIC VOLUME: 22.78 ML
LEFT VENTRICULAR INTERNAL DIMENSION IN DIASTOLE: 3.65 CM (ref 3.5–6)
LEFT VENTRICULAR MASS: 119.97 G
LV LATERAL E/E' RATIO: 5.62 M/S
LV SEPTAL E/E' RATIO: 7.3 M/S
LVOT MG: 2.43 MMHG
LVOT MV: 0.71 CM/S
MV PEAK A VEL: 0.69 M/S
MV PEAK E VEL: 0.73 M/S
MV STENOSIS PRESSURE HALF TIME: 63.09 MS
MV VALVE AREA P 1/2 METHOD: 3.49 CM2
OHS CV CPX 1 MINUTE RECOVERY HEART RATE: 85 BPM
OHS CV CPX 85 PERCENT MAX PREDICTED HEART RATE MALE: 133
OHS CV CPX ESTIMATED METS: 7
OHS CV CPX MAX PREDICTED HEART RATE: 156
OHS CV CPX PATIENT IS FEMALE: 1
OHS CV CPX PATIENT IS MALE: 0
OHS CV CPX PEAK DIASTOLIC BLOOD PRESSURE: 79 MMHG
OHS CV CPX PEAK HEAR RATE: 151 BPM
OHS CV CPX PEAK RATE PRESSURE PRODUCT: ABNORMAL
OHS CV CPX PEAK SYSTOLIC BLOOD PRESSURE: 184 MMHG
OHS CV CPX PERCENT MAX PREDICTED HEART RATE ACHIEVED: 101
OHS CV CPX RATE PRESSURE PRODUCT PRESENTING: 9728
PISA AR MAX VEL: 4.52 M/S
PISA MRMAX VEL: 3.99 M/S
PISA TR MAX VEL: 2.5 M/S
PV MEAN GRADIENT: 1 MMHG
RA MAJOR: 5.03 CM
RA PRESSURE ESTIMATED: 3 MMHG
RA WIDTH: 3.35 CM
RV TB RVSP: 6 MMHG
SINUS: 3.75 CM
STJ: 3.65 CM
STRESS ECHO POST EXERCISE DUR MIN: 6 MINUTES
STRESS ECHO POST EXERCISE DUR SEC: 1 SECONDS
SYSTOLIC BLOOD PRESSURE: 152 MMHG
TDI LATERAL: 0.13 M/S
TDI SEPTAL: 0.1 M/S
TDI: 0.12 M/S
TR MAX PG: 25 MMHG
TRICUSPID ANNULAR PLANE SYSTOLIC EXCURSION: 2.25 CM
TV REST PULMONARY ARTERY PRESSURE: 28 MMHG
Z-SCORE OF LEFT VENTRICULAR DIMENSION IN END DIASTOLE: -2.68
Z-SCORE OF LEFT VENTRICULAR DIMENSION IN END SYSTOLE: -1.25

## 2024-02-19 PROCEDURE — 93351 STRESS TTE COMPLETE: CPT

## 2024-02-19 PROCEDURE — 93351 STRESS TTE COMPLETE: CPT | Mod: 26,,, | Performed by: STUDENT IN AN ORGANIZED HEALTH CARE EDUCATION/TRAINING PROGRAM

## 2024-02-19 PROCEDURE — 93325 DOPPLER ECHO COLOR FLOW MAPG: CPT | Mod: 26,,, | Performed by: STUDENT IN AN ORGANIZED HEALTH CARE EDUCATION/TRAINING PROGRAM

## 2024-02-19 PROCEDURE — 93320 DOPPLER ECHO COMPLETE: CPT | Mod: 26,,, | Performed by: STUDENT IN AN ORGANIZED HEALTH CARE EDUCATION/TRAINING PROGRAM

## 2024-02-21 DIAGNOSIS — R91.8 ABNORMAL CT LUNG SCREENING: Primary | ICD-10-CM

## 2024-04-01 DIAGNOSIS — J45.40 MODERATE PERSISTENT ASTHMA WITHOUT COMPLICATION: ICD-10-CM

## 2024-04-01 DIAGNOSIS — J44.9 CHRONIC OBSTRUCTIVE PULMONARY DISEASE, UNSPECIFIED COPD TYPE: ICD-10-CM

## 2024-04-01 RX ORDER — FLUTICASONE FUROATE, UMECLIDINIUM BROMIDE AND VILANTEROL TRIFENATATE 200; 62.5; 25 UG/1; UG/1; UG/1
POWDER RESPIRATORY (INHALATION)
Qty: 60 EACH | Refills: 7 | Status: SHIPPED | OUTPATIENT
Start: 2024-04-01

## 2024-04-25 ENCOUNTER — OFFICE VISIT (OUTPATIENT)
Dept: INTERNAL MEDICINE | Facility: CLINIC | Age: 64
End: 2024-04-25
Payer: COMMERCIAL

## 2024-04-25 ENCOUNTER — LAB VISIT (OUTPATIENT)
Dept: LAB | Facility: HOSPITAL | Age: 64
End: 2024-04-25
Attending: FAMILY MEDICINE
Payer: COMMERCIAL

## 2024-04-25 VITALS
HEART RATE: 73 BPM | SYSTOLIC BLOOD PRESSURE: 134 MMHG | HEIGHT: 65 IN | WEIGHT: 141.56 LBS | OXYGEN SATURATION: 96 % | DIASTOLIC BLOOD PRESSURE: 80 MMHG | BODY MASS INDEX: 23.58 KG/M2

## 2024-04-25 DIAGNOSIS — D83.9 COMMON VARIABLE IMMUNODEFICIENCY: ICD-10-CM

## 2024-04-25 DIAGNOSIS — R73.03 PREDIABETES: ICD-10-CM

## 2024-04-25 DIAGNOSIS — Z87.891 FORMER SMOKER: ICD-10-CM

## 2024-04-25 DIAGNOSIS — J44.89 ASTHMA-COPD OVERLAP SYNDROME: ICD-10-CM

## 2024-04-25 DIAGNOSIS — J45.40 MODERATE PERSISTENT ASTHMA WITHOUT COMPLICATION: ICD-10-CM

## 2024-04-25 DIAGNOSIS — R91.8 ABNORMAL CT LUNG SCREENING: ICD-10-CM

## 2024-04-25 DIAGNOSIS — J41.0 SIMPLE CHRONIC BRONCHITIS: ICD-10-CM

## 2024-04-25 DIAGNOSIS — Z12.31 ENCOUNTER FOR SCREENING MAMMOGRAM FOR MALIGNANT NEOPLASM OF BREAST: ICD-10-CM

## 2024-04-25 DIAGNOSIS — Z53.20 REFUSAL OF STATIN MEDICATION BY PATIENT: ICD-10-CM

## 2024-04-25 DIAGNOSIS — I70.0 ABDOMINAL AORTIC ATHEROSCLEROSIS: ICD-10-CM

## 2024-04-25 DIAGNOSIS — E83.119 HEMOCHROMATOSIS, UNSPECIFIED HEMOCHROMATOSIS TYPE: ICD-10-CM

## 2024-04-25 DIAGNOSIS — Z00.00 ANNUAL PHYSICAL EXAM: Primary | ICD-10-CM

## 2024-04-25 DIAGNOSIS — I25.10 CORONARY ARTERY DISEASE INVOLVING NATIVE CORONARY ARTERY OF NATIVE HEART WITHOUT ANGINA PECTORIS: ICD-10-CM

## 2024-04-25 DIAGNOSIS — R91.1 SOLITARY PULMONARY NODULE: ICD-10-CM

## 2024-04-25 DIAGNOSIS — Z00.00 ANNUAL PHYSICAL EXAM: ICD-10-CM

## 2024-04-25 PROBLEM — J32.9 RECURRENT SINUSITIS: Status: RESOLVED | Noted: 2021-02-04 | Resolved: 2024-04-25

## 2024-04-25 PROBLEM — J40 BRONCHITIS: Status: RESOLVED | Noted: 2021-02-04 | Resolved: 2024-04-25

## 2024-04-25 PROBLEM — R53.83 MALAISE AND FATIGUE: Status: RESOLVED | Noted: 2022-07-28 | Resolved: 2024-04-25

## 2024-04-25 PROBLEM — M77.9 TENDONITIS: Status: RESOLVED | Noted: 2021-02-04 | Resolved: 2024-04-25

## 2024-04-25 PROBLEM — R53.81 MALAISE AND FATIGUE: Status: RESOLVED | Noted: 2022-07-28 | Resolved: 2024-04-25

## 2024-04-25 LAB
ALBUMIN SERPL BCP-MCNC: 4.3 G/DL (ref 3.5–5.2)
ALP SERPL-CCNC: 68 U/L (ref 55–135)
ALT SERPL W/O P-5'-P-CCNC: 9 U/L (ref 10–44)
ANION GAP SERPL CALC-SCNC: 12 MMOL/L (ref 8–16)
AST SERPL-CCNC: 18 U/L (ref 10–40)
BILIRUB SERPL-MCNC: 0.8 MG/DL (ref 0.1–1)
BUN SERPL-MCNC: 19 MG/DL (ref 8–23)
CALCIUM SERPL-MCNC: 9.7 MG/DL (ref 8.7–10.5)
CHLORIDE SERPL-SCNC: 104 MMOL/L (ref 95–110)
CHOLEST SERPL-MCNC: 163 MG/DL (ref 120–199)
CHOLEST/HDLC SERPL: 2.5 {RATIO} (ref 2–5)
CO2 SERPL-SCNC: 22 MMOL/L (ref 23–29)
CREAT SERPL-MCNC: 1 MG/DL (ref 0.5–1.4)
ERYTHROCYTE [DISTWIDTH] IN BLOOD BY AUTOMATED COUNT: 11.9 % (ref 11.5–14.5)
EST. GFR  (NO RACE VARIABLE): >60 ML/MIN/1.73 M^2
ESTIMATED AVG GLUCOSE: 91 MG/DL (ref 68–131)
GLUCOSE SERPL-MCNC: 88 MG/DL (ref 70–110)
HBA1C MFR BLD: 4.8 % (ref 4–5.6)
HCT VFR BLD AUTO: 45.6 % (ref 37–48.5)
HDLC SERPL-MCNC: 66 MG/DL (ref 40–75)
HDLC SERPL: 40.5 % (ref 20–50)
HGB BLD-MCNC: 16.1 G/DL (ref 12–16)
LDLC SERPL CALC-MCNC: 84 MG/DL (ref 63–159)
MCH RBC QN AUTO: 35.2 PG (ref 27–31)
MCHC RBC AUTO-ENTMCNC: 35.3 G/DL (ref 32–36)
MCV RBC AUTO: 100 FL (ref 82–98)
NONHDLC SERPL-MCNC: 97 MG/DL
PLATELET # BLD AUTO: 187 K/UL (ref 150–450)
PMV BLD AUTO: 10.4 FL (ref 9.2–12.9)
POTASSIUM SERPL-SCNC: 3.7 MMOL/L (ref 3.5–5.1)
PROT SERPL-MCNC: 7.7 G/DL (ref 6–8.4)
RBC # BLD AUTO: 4.57 M/UL (ref 4–5.4)
SODIUM SERPL-SCNC: 138 MMOL/L (ref 136–145)
TRIGL SERPL-MCNC: 65 MG/DL (ref 30–150)
TSH SERPL DL<=0.005 MIU/L-ACNC: 1.37 UIU/ML (ref 0.4–4)
WBC # BLD AUTO: 5.58 K/UL (ref 3.9–12.7)

## 2024-04-25 PROCEDURE — 99396 PREV VISIT EST AGE 40-64: CPT | Mod: S$GLB,,, | Performed by: FAMILY MEDICINE

## 2024-04-25 PROCEDURE — 85027 COMPLETE CBC AUTOMATED: CPT | Performed by: FAMILY MEDICINE

## 2024-04-25 PROCEDURE — 84443 ASSAY THYROID STIM HORMONE: CPT | Performed by: FAMILY MEDICINE

## 2024-04-25 PROCEDURE — 36415 COLL VENOUS BLD VENIPUNCTURE: CPT | Performed by: FAMILY MEDICINE

## 2024-04-25 PROCEDURE — 80053 COMPREHEN METABOLIC PANEL: CPT | Performed by: FAMILY MEDICINE

## 2024-04-25 PROCEDURE — 99999 PR PBB SHADOW E&M-EST. PATIENT-LVL III: CPT | Mod: PBBFAC,,, | Performed by: FAMILY MEDICINE

## 2024-04-25 PROCEDURE — 83036 HEMOGLOBIN GLYCOSYLATED A1C: CPT | Performed by: FAMILY MEDICINE

## 2024-04-25 PROCEDURE — 80061 LIPID PANEL: CPT | Performed by: FAMILY MEDICINE

## 2024-04-25 RX ORDER — METFORMIN HYDROCHLORIDE 500 MG/1
500 TABLET ORAL
Qty: 90 TABLET | Refills: 3 | Status: SHIPPED | OUTPATIENT
Start: 2024-04-25 | End: 2025-04-25

## 2024-04-25 RX ORDER — NEBULIZER AND COMPRESSOR
EACH MISCELLANEOUS
Qty: 1 EACH | Refills: 0 | Status: SHIPPED | OUTPATIENT
Start: 2024-04-25

## 2024-04-25 NOTE — PROGRESS NOTES
"Subjective:   Patient ID: María Julian is a 64 y.o. female.  Chief Complaint:  Annual Exam    Presents for annual physical exam  Also followed by:  External GI for hemochromatosis  Internal immunology for immunodeficiency   Internal Cardiology for hypertension  Internal pulmonology for COPD/asthma  Internal interventional pain management for chronic low back pain  Internal Dermatology  Internal OBGYN     Last annual physical exam April 2023   A1c normal.   CBC with normal white cell count, red cell count, and platelet level  CMP with acceptable glucose, kidney, liver, electrolytes  Lipid panel stable.  Thyroid testing is normal.    Health maintenance needs include:  - COVID booster  - Shingles vaccine series     No new complaints or concerns today      Review of Systems   Constitutional:  Negative for activity change and unexpected weight change.   HENT:  Negative for hearing loss, rhinorrhea and trouble swallowing.    Eyes:  Negative for discharge and visual disturbance.   Respiratory:  Positive for chest tightness and wheezing.    Cardiovascular:  Negative for chest pain and palpitations.   Gastrointestinal:  Negative for blood in stool, constipation, diarrhea and vomiting.   Endocrine: Negative for polydipsia and polyuria.   Genitourinary:  Negative for difficulty urinating, dysuria, hematuria and menstrual problem.   Musculoskeletal:  Negative for arthralgias, joint swelling and neck pain.   Neurological:  Negative for weakness and headaches.   Psychiatric/Behavioral:  Negative for confusion and dysphoric mood.      Objective:   /80 (BP Location: Left arm, Patient Position: Sitting, BP Method: Large (Manual))   Pulse 73   Ht 5' 5" (1.651 m)   Wt 64.2 kg (141 lb 8.6 oz)   SpO2 96%   BMI 23.55 kg/m²     Physical Exam  Vitals and nursing note reviewed.   Constitutional:       Appearance: Normal appearance. She is well-developed and overweight.   HENT:      Right Ear: Tympanic membrane and ear " canal normal.      Left Ear: Tympanic membrane and ear canal normal.      Mouth/Throat:      Pharynx: Oropharynx is clear. Uvula midline.   Eyes:      General: No scleral icterus.     Conjunctiva/sclera: Conjunctivae normal.   Neck:      Thyroid: No thyroid mass, thyromegaly or thyroid tenderness.      Vascular: No JVD.   Cardiovascular:      Rate and Rhythm: Normal rate and regular rhythm.      Heart sounds: Normal heart sounds. No murmur heard.     No friction rub. No gallop.   Pulmonary:      Effort: Pulmonary effort is normal.      Breath sounds: Normal breath sounds. No decreased breath sounds, wheezing, rhonchi or rales.   Abdominal:      General: There is no distension.      Palpations: Abdomen is soft. There is no hepatomegaly.      Tenderness: There is no abdominal tenderness. There is no guarding or rebound.   Musculoskeletal:      Right lower leg: No edema.      Left lower leg: No edema.   Lymphadenopathy:      Cervical: No cervical adenopathy.   Skin:     Findings: No rash.   Neurological:      General: No focal deficit present.      Mental Status: She is alert and oriented to person, place, and time.   Psychiatric:         Mood and Affect: Mood and affect normal.       Assessment:       ICD-10-CM ICD-9-CM   1. Annual physical exam  Z00.00 V70.0   2. Encounter for screening mammogram for malignant neoplasm of breast  Z12.31 V76.12   3. Prediabetes  R73.03 790.29   4. Coronary artery disease involving native coronary artery of native heart without angina pectoris  I25.10 414.01   5. Refusal of statin medication by patient  Z53.20 V64.2   6. Abdominal aortic atherosclerosis  I70.0 440.0   7. Asthma-COPD overlap syndrome  J44.89 493.20   8. Moderate persistent asthma without complication  J45.40 493.90   9. Simple chronic bronchitis  J41.0 491.0   10. Common variable immunodeficiency  D83.9 279.06   11. Hemochromatosis, unspecified hemochromatosis type  E83.119 275.03   12. Former smoker  Z87.891 V15.82    13. Abnormal CT lung screening  R91.8 793.19   14. Solitary pulmonary nodule  R91.1 793.11     Plan:   Annual physical exam  Encounter for screening mammogram for malignant neoplasm of breast  -     CBC Without Differential; Future; Expected date: 04/25/2024  -     Comprehensive Metabolic Panel; Future; Expected date: 04/25/2024  -     Lipid Panel; Future; Expected date: 04/25/2024  -     TSH; Future; Expected date: 04/25/2024  -     Hemoglobin A1C; Future; Expected date: 04/25/2024  Blood pressure normal.  BMI 24.  Overall exam stable.    Colon cancer screening up-to-date until November 2033   Breast cancer screening ordered for June 2024   Tetanus booster, flu vaccine, pneumonia vaccine, and RSV vaccine up-to-date  Declines shingles vaccines  Declines COVID booster    Prediabetes  -     Hemoglobin A1C; Future; Expected date: 04/25/2024  -     metFORMIN (GLUCOPHAGE) 500 MG tablet; Take 1 tablet (500 mg total) by mouth daily with breakfast.  Dispense: 90 tablet; Refill: 3  Asymptomatic   If A1c less than 6.5%, continue metformin 500 mg daily   If A1c greater than 6.5%, increase metformin 500 mg twice a day     Coronary artery disease involving native coronary artery of native heart without angina pectoris  Abdominal aortic atherosclerosis  Refusal of statin medication by patient  -     Lipid Panel; Future; Expected date: 04/25/2024  Rediscussed statin indication and significant 10 year cardiovascular risk   Patient continues to refuse statin   Based on current lipid panel, consider potential treatment with Nexletol or Repatha  Follow-up cardiology as scheduled     Asthma-COPD overlap syndrome  Moderate persistent asthma without complication  Simple chronic bronchitis  -     nebulizer and compressor Beatrice; Use to deliver nebulizer medications up to 6 times a day  Dispense: 1 each; Refill: 0  Symptoms currently stable and reasonably controlled  Order new nebulizer machine to use with her nebulizer  medications  Follow-up pulmonology as scheduled    Common variable immunodeficiency  Stable   Follow-up immunology as scheduled     Hemochromatosis, unspecified hemochromatosis type  Stable   Follow-up gastroenterology as scheduled     Former smoker  Abnormal CT lung screening  Solitary pulmonary nodule  Continues to remain smoke free  Last low-dose CT with suspicious lesion requiring sooner follow-up   Scheduled repeat low-dose CT screening for August 2024    Return to clinic 1 year for annual physical exam or sooner as needed

## 2024-05-07 PROBLEM — R53.82 CHRONIC FATIGUE AND MALAISE: Status: ACTIVE | Noted: 2022-07-28

## 2024-05-07 PROBLEM — J32.9 RECURRENT SINUSITIS: Status: ACTIVE | Noted: 2024-05-07

## 2024-05-07 PROBLEM — J40 BRONCHITIS: Status: ACTIVE | Noted: 2024-05-07

## 2024-05-07 NOTE — PROGRESS NOTES
Subjective:       Patient ID: María Julian is a 64 y.o. female.    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  For almost 13 years , on monthly IVIG infusions      HPI:  Constant day and night productive cough with scanty white sputium. Thick mucus CT Chest 02- 21- 2024-   revealed mucus plugs with atelectasis. Radiologist recommends 6 months follow up.     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 Diagnosed about 15 years ago. Once  repeated immunizations and antibiotic prophylaxis failed, sge was started  On monthly IVIG infusions-- with great benefit.  Trough IgG levels are monitored. She will b followed up for potential short and long- term sequelae of CVID. BREAKTHROUGH INFECTIONS ARE PROMPTLY TREATED.  PATITO IS AN EX - CIGARETTE SMOKER. She has asthma- COPD.   May get CXRs or CT chest  scan chest to monitor for lung cancer.  HAS HEMOCHROMATOSIS.  HAD HAD CHRONIC MALAISE AND FATIGUE. Works at a [employer]       Erythromycin, Macrolide antibiotics, Iodine and iodide containing products, and Levofloxacin -- ALLERGIES WERE REPORTED    Past Medical History:   Diagnosis Date    Abnormal Pap smear of cervix     Allergy     Asthma     COPD (chronic obstructive pulmonary disease)     Malignant hyperthermia     pt's mother has hx of MH.  Pt denies any complications with general anesthesia    Recurrent upper respiratory infection (URI)        Family History   Problem Relation Name Age of Onset    Malignant hypertension Mother Elis Black     Heart disease Mother Elis Black     Cancer Father Raymond Black         Liver cancer    Diabetes Father Raymond Black     Ovarian cancer Sister Sharda Black     Alcohol abuse Sister Sharda Black     Early death Sister Sharda Black        Environmental History: Dust Mite Controls: Dust mite controls are already in place.     Review of Systems   Constitutional:  Positive for fatigue.  Negative for fever.   HENT:  Positive for congestion, postnasal drip and rhinorrhea. Negative for ear pain, sinus pressure, sinus pain, sneezing and sore throat.    Eyes:  Positive for itching. Negative for redness.   Respiratory:  Positive for cough and chest tightness. Negative for shortness of breath and wheezing.    Cardiovascular: Negative.  Negative for chest pain.   Gastrointestinal: Negative.  Negative for nausea.   Endocrine: Negative.  Negative for cold intolerance.   Genitourinary: Negative.  Negative for frequency.   Musculoskeletal: Negative.  Negative for myalgias.   Skin: Negative.  Negative for rash.   Allergic/Immunologic: Positive for environmental allergies. Negative for food allergies and immunocompromised state.   Neurological: Negative.  Negative for dizziness and headaches.   Hematological: Negative.  Negative for adenopathy.   Psychiatric/Behavioral: Negative.  Negative for sleep disturbance.      Objective:     There were no vitals taken for this visit.    Physical Exam  Vitals and nursing note reviewed.   Constitutional:       Appearance: Normal appearance. She is normal weight.   HENT:      Head: Normocephalic and atraumatic.      Right Ear: Tympanic membrane, ear canal and external ear normal.      Left Ear: Tympanic membrane, ear canal and external ear normal.      Nose: Congestion present.      Mouth/Throat:      Mouth: Mucous membranes are moist.      Pharynx: Oropharynx is clear.   Eyes:      Extraocular Movements: Extraocular movements intact.      Conjunctiva/sclera: Conjunctivae normal.      Pupils: Pupils are equal, round, and reactive to light.   Cardiovascular:      Rate and Rhythm: Normal rate and regular rhythm.      Pulses: Normal pulses.      Heart sounds: Normal heart sounds.   Pulmonary:      Effort: Pulmonary effort is normal.      Breath sounds: Normal breath sounds.   Abdominal:      General: Abdomen is flat. Bowel sounds are normal.      Palpations: Abdomen is soft.    Musculoskeletal:         General: Normal range of motion.      Cervical back: Normal range of motion and neck supple.   Skin:     General: Skin is warm and dry.      Capillary Refill: Capillary refill takes less than 2 seconds.   Neurological:      General: No focal deficit present.      Mental Status: She is alert and oriented to person, place, and time. Mental status is at baseline.   Psychiatric:         Mood and Affect: Mood normal.         Behavior: Behavior normal.         Thought Content: Thought content normal.         Judgment: Judgment normal.       Assessment:      1. CVID (common variable immunodeficiency)    2. 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 Recurrent sinusitis    4. Bronchitis    5. Chronic fatigue and malaise    6. Asthma-COPD overlap syndrome    7. Hemochromatosis, unspecified hemochromatosis type    8. Ex-cigarette smoker        Plan:     Monthly Privigen 10 % - 30 grams- 300 ml- infused over 4 hours- GET PRE CERT-- TO CONTINUE THE IVIG INFUSIONS- Very much helped by IVIG.  Monitor trough IgG levels ( 1113 mg/ dl on 12- 22- 2023 ) and monitor for short and long term CVID sequelae.  ---------------------------------------------------------------------------------------------------------------  Treat all infections- AUGMENTIN, AZITHROMYCIN AND AS NEEDED- with Prednisone 20 mg qd 3- 5 days  -----------------------------------------------------------------------------------------------------------------------------------------------------  Trelegy 200 / 62.5/ 25- one puff daily.  Proair HFA 90 mcg 2 puffs tid prn.  Nebulized Budesonide plus Duoneb bid prn  Treat all infections.  ----- Prescribed today Doxycycline 100 mg bid for10 days- start when needed.  -----------------------------------------------------------------------------  On 10- 24- 2023-- had AREZVY vaccine  Had 5 doses of COVID vaccine.  Annual influenza vaccine.  Had [employer]  consult Pamela Stephens NP.  GI consult and colonoscopy by Dr Stephens- Had 16 polyps- some pre malignant- will repeat in a year.  Follow up with David perez MD.  FOLLOW UP IN 3 MONTHS                  Problems Address                                                 Amount and/or Complexity                                                                      Risk       3           [] 2 or more self-limited or minor problems                      [] Limited                                                                        [] Low                  [] 1 stable chronic illness                                                  Any combination of the two                                               OTC drugs                  []Acute, uncomplicated illness or injury                            Review of prior external notes from unique source           Minor surgery with no risk factors                                                                                                               [] 1 []2  []3+                                                                                                              Review of results from each unique test                                                                                                               [] 1 []2  [] 3+                                                                                                              Order of each unique test                                                                                                               [] 1 []2  [] 3+                                                                                                              Or                                                                                                             [] Assessment requiring an independent historian      4            [] One or more chronic illness with exacerbation,              [] Moderate                                                                       [] Moderate                 Progression, or side effects of treatment                            -test documents or independent historians                        Prescription drug management                []  2 or more sable chronic illnesses                                    [] Independent interpretation of tests                              Minor surgery with identifiable risk                [] 1 undiagnosed new problem with uncertain prognosis    [] Discussion or management of test results                    elective major surgery                [] 1 acute illness with                systemic symptoms                                                                                                                                                              [] 1 acute complicated injury                                                                                                                                          Elective major surgery                                                                                                                                                                                                                                                                                                                                                                                                  5            [x] 1 or more chronic illnesses with severe exacerbation,     [x] Extensive(two from below)                                         [x] High                                                                                                               [] Independent interpretation of results                         Drug therapy requiring intensive                                                                                                               []Discussion of management or test interpretation           monitoring                                                                                                                                                                                                        Decision to de-escalate care                 [] 1 acute or chronic illness or injury that poses a threat                                                                                               Decision regarding hospitalization

## 2024-05-08 ENCOUNTER — OFFICE VISIT (OUTPATIENT)
Dept: ALLERGY | Facility: CLINIC | Age: 64
End: 2024-05-08
Payer: COMMERCIAL

## 2024-05-08 VITALS
HEIGHT: 65 IN | SYSTOLIC BLOOD PRESSURE: 127 MMHG | DIASTOLIC BLOOD PRESSURE: 77 MMHG | TEMPERATURE: 99 F | HEART RATE: 83 BPM | WEIGHT: 143.06 LBS | BODY MASS INDEX: 23.83 KG/M2

## 2024-05-08 DIAGNOSIS — D80.6 SPECIFIC ANTIBODY DEFICIENCY WITH NORMAL IG CONCENTRATION AND NORMAL NUMBER OF B CELLS: ICD-10-CM

## 2024-05-08 DIAGNOSIS — D83.9 CVID (COMMON VARIABLE IMMUNODEFICIENCY): Primary | ICD-10-CM

## 2024-05-08 DIAGNOSIS — J44.89 ASTHMA-COPD OVERLAP SYNDROME: ICD-10-CM

## 2024-05-08 DIAGNOSIS — J40 BRONCHITIS: ICD-10-CM

## 2024-05-08 DIAGNOSIS — Z87.891 EX-CIGARETTE SMOKER: ICD-10-CM

## 2024-05-08 DIAGNOSIS — E83.119 HEMOCHROMATOSIS, UNSPECIFIED HEMOCHROMATOSIS TYPE: ICD-10-CM

## 2024-05-08 DIAGNOSIS — R53.82 CHRONIC FATIGUE AND MALAISE: ICD-10-CM

## 2024-05-08 DIAGNOSIS — R53.81 CHRONIC FATIGUE AND MALAISE: ICD-10-CM

## 2024-05-08 DIAGNOSIS — J32.9 RECURRENT SINUSITIS: ICD-10-CM

## 2024-05-08 PROCEDURE — 99215 OFFICE O/P EST HI 40 MIN: CPT | Mod: S$GLB,,, | Performed by: SPECIALIST

## 2024-05-08 PROCEDURE — 99999 PR PBB SHADOW E&M-EST. PATIENT-LVL IV: CPT | Mod: PBBFAC,,, | Performed by: SPECIALIST

## 2024-05-08 RX ORDER — DOXYCYCLINE 100 MG/1
100 CAPSULE ORAL 2 TIMES DAILY
Qty: 20 CAPSULE | Refills: 0 | Status: SHIPPED | OUTPATIENT
Start: 2024-05-08 | End: 2024-05-18

## 2024-05-10 NOTE — PROGRESS NOTES
[facility], [facility].  [address]  [facility] [address]    Oximetry Report  Testing Date: 05/10/24      Name: Dami Franklin    Age: 77 y.o.     Gender: male   Diagnosis:   1. Pulmonary emphysema with fibrosis of lung (HCC)                                            R  Weight: 204                                                  Height: 71   Smoke HX:  reports that he quit smoking about 23 years ago. His smoking use included cigarettes. He started smoking about 48 years ago. He has a 12.5 pack-year smoking history. He has never used smokeless tobacco.                                                            Max - Target  Heart Rate 183 / 146  Inspired O2 SP02% Max Heart Rate Assist Device Activity Pace Distance Walked (ft) Time (min.)   R/A 97 60  rest      R/A 91 90  Walk  Slow  1000 6   N/C-2          N/C-3          N/C          R/A= Roomed Air, NC = Oxygen by Nasal Cannula    Distance Walk Predicted Distance LLN** Predicted % Duration (min) Duration of Stops Sec Selvin Dyspnea Selvin Fatigue   1000 1652 1154 60 6 1 min 3 3   **LLN= Lower limits of normal **LLN= Lower limits of normal  (from Fadia and Isabel, American Journal of Respiratory and Critical Care Medicine;158:1384-87)       Comments: The patient walked for 6 minutes at a slow pace. He walked for 1000 ft on room air and his oxygen saturation 91%.  He  did exhibit signs of dyspnea and did complain.             Patient had some difficulty performing    Subjective:       Patient ID: María Julian is a 62 y.o. female.    Chief Complaint:    FOLLOW UP ON CVID, RECURRENT INFECTIONS / ALLERGY/ ASTHMA- COPD AND HEMOCHROMATOSIS.    HPI:   female 62 year old -- has decades history of recurrent infections -- requiring per year treatment with multiple courses of antibiotics -- prescribed by her PCP and otolaryngologist.    Immune work up was done. She  was immunized with Pneumovax- 23 AND PREVNAR- 13--- and vaccination responses were determined .  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    Antibiotic prophylaxis failed. Ms Julian was started on antibody replacement with IVIG. ALL INFECTIONS ARE TREATED PROMPTLY.  SHE IS AN EX SMOKER AND HAS ASTHMA- COPD. She loves LAMA- Spiriva and Breo Ellipta--- ICS- LABA . She is aware of asthma triggers.   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 NO occupational exposure to asthma, [employer] OR [employer].    HAS CHRONIC MALAISE AND FATIGUE. FOLLOW UP WITH THE  gastroenterologist  -- on hemochromatosis.  Must do pulmonary follow up and CT chest per EX Smoker's lung cancer screening protocol.         Erythromycin, Macrolide antibiotics, Iodine and iodide containing products, and Levofloxacin --- are the reported drug allergies    Past Medical History:   Diagnosis Date    Abnormal Pap smear of cervix     Allergy     Asthma     COPD (chronic obstructive pulmonary disease)     Malignant hyperthermia     pt's mother has hx of MH.  Pt denies any complications with general anesthesia    Recurrent upper respiratory infection (URI)        Family History   Problem Relation Age of Onset    Malignant hypertension Mother        Environmental History: Dust Mite Controls: Dust mite controls are already in place.     Review of Systems   Constitutional: Negative.  Negative for fatigue and fever.   HENT:  Positive for congestion, postnasal drip  and rhinorrhea. Negative for ear pain, sinus pressure, sinus pain, sneezing and sore throat.    Eyes:  Positive for itching. Negative for redness.   Respiratory:  Positive for cough. Negative for chest tightness, shortness of breath and wheezing.    Cardiovascular: Negative.  Negative for chest pain.   Gastrointestinal: Negative.  Negative for nausea.   Endocrine: Negative.  Negative for cold intolerance.   Genitourinary: Negative.  Negative for frequency.   Musculoskeletal: Negative.  Negative for myalgias.   Skin: Negative.  Negative for rash.   Allergic/Immunologic: Positive for environmental allergies. Negative for food allergies and immunocompromised state.   Neurological: Negative.  Negative for dizziness and headaches.   Hematological: Negative.  Negative for adenopathy.   Psychiatric/Behavioral: Negative.  Negative for sleep disturbance.      Objective:     There were no vitals taken for this visit.    Physical Exam  Vitals and nursing note reviewed.   Constitutional:       Appearance: Normal appearance. She is normal weight.   HENT:      Head: Normocephalic and atraumatic.      Right Ear: Tympanic membrane, ear canal and external ear normal.      Left Ear: Tympanic membrane, ear canal and external ear normal.      Nose: Congestion and rhinorrhea present.      Mouth/Throat:      Mouth: Mucous membranes are moist.      Pharynx: Oropharynx is clear.   Eyes:      Extraocular Movements: Extraocular movements intact.      Conjunctiva/sclera: Conjunctivae normal.      Pupils: Pupils are equal, round, and reactive to light.   Cardiovascular:      Rate and Rhythm: Normal rate and regular rhythm.      Pulses: Normal pulses.      Heart sounds: Normal heart sounds.   Pulmonary:      Effort: Pulmonary effort is normal.      Breath sounds: Normal breath sounds.   Abdominal:      General: Abdomen is flat. Bowel sounds are normal.      Palpations: Abdomen is soft.   Musculoskeletal:         General: Normal range of motion.       Cervical back: Normal range of motion and neck supple.   Skin:     General: Skin is warm and dry.      Capillary Refill: Capillary refill takes less than 2 seconds.   Neurological:      General: No focal deficit present.      Mental Status: She is alert and oriented to person, place, and time. Mental status is at baseline.   Psychiatric:         Mood and Affect: Mood normal.         Behavior: Behavior normal.         Thought Content: Thought content normal.         Judgment: Judgment normal.       Assessment:      1. Moderate persistent asthma without complication    2. CVID (common variable immunodeficiency)    3. Recurrent sinusitis    4. Bronchitis    5. Malaise and fatigue    6. Ex-cigarette smoker    7. Asthma-COPD overlap syndrome    8. Cough in adult    9. Hemochromatosis, unspecified hemochromatosis type      10     Multiple drug allergies    Plan:     Monthly IVIG PRIVIGEN 10 %-- 30 GRAMS-- 300 ML-- INFUSED OVER 4 HOURS  Monitor trough Ig G- Q 3- 4 MONTHS. -- 10/ 22/ 2022- 1090 mg / dl, 8/ 2/ 2020-- 1214 mg / dl, 05/ 22/ 2022-- 1030 mg / dl  Monitor for short and long term sequelae of CVID.  Repeat spirogram - Do next visit--   As an ex cigarette smoker- recommend per protocol , chest imaging to monitor for malignancy  No longer smoking cigarettes  TRELEGY ELLIPTA 200 / 62.5 / 25---- ONE PUFF DAILY  Proair HFA 90 mcg- qd or bid  Prednisone 20 mg qd for 3- 5 days for asthma flare ups.  Treat all infections-- Augmentin and Biaxin caused prolonged vaginal yeast  Re emphasized environmental control measures.  No longer on SCIT / SLIT / AIT   Follow up on Hemochromatosis-- Gastroenterology and PCP  ANNUAL INFLUENZA VACCINATIONS.  Covid VACCINE.-- 3 doses  FOLLOW UP IN 3 MONTHS-- repeat spirogram                  Problems Address                                                 Amount and/or Complexity                                                                      Risk       3           [] 2 or more  self-limited or minor problems                      [] Limited                                                                        [] Low                  [] 1 stable chronic illness                                                  Any combination of the two                                               OTC drugs                  []Acute, uncomplicated illness or injury                            Review of prior external notes from unique source           Minor surgery with no risk factors                                                                                                               [] 1 []2  []3+                                                                                                              Review of results from each unique test                                                                                                               [] 1 []2  [] 3+                                                                                                              Order of each unique test                                                                                                               [] 1 []2  [] 3+                                                                                                              Or                                                                                                             [] Assessment requiring an independent historian      4            [x] One or more chronic illness with exacerbation,              [x] Moderate                                                                      [x] Moderate                 Progression, or side effects of treatment                            -test documents or independent historians                        Prescription drug management                [x]  2 or more sable chronic illnesses                                    [] Independent interpretation of tests                              Minor  surgery with identifiable risk                [] 1 undiagnosed new problem with uncertain prognosis    [] Discussion or management of test results                    elective major surgery                [] 1 acute illness with                systemic symptoms                                                                                                                                                              [] 1 acute complicated injury                                                                                                                                          Elective major surgery                                                                                                                                                                                                                                                                                                                                                                                                  5            [] 1 or more chronic illnesses with severe exacerbation,     [] Extensive(two from below)                                         [] High                                                                                                               [] Independent interpretation of results                         Drug therapy requiring intensive                                                                                                               []Discussion of management or test interpretation           monitoring                                                                                                                                                                                                       Decision to de-escalate care                 [] 1 acute or chronic illness or injury that poses a threat                                                                                               Decision regarding  hospitalization                                                                                                                                                                                                                                  Problems Address                                                 Amount and/or Complexity                                                                      Risk       3           [] 2 or more self-limited or minor problems                      [] Limited                                                                        [] Low                  [] 1 stable chronic illness                                                  Any combination of the two                                               OTC drugs                  []Acute, uncomplicated illness or injury                            Review of prior external notes from unique source           Minor surgery with no risk factors                                                                                                               [] 1 []2  []3+                                                                                                              Review of results from each unique test                                                                                                               [] 1 []2  [] 3+                                                                                                              Order of each unique test                                                                                                               [] 1 []2  [] 3+                                                                                                              Or                                                                                                             [] Assessment requiring an independent historian      4            [x] One or more chronic illness with exacerbation,              [x]  Moderate                                                                      [x] Moderate                 Progression, or side effects of treatment                            -test documents or independent historians                        Prescription drug management                [x]  2 or more sable chronic illnesses                                    [] Independent interpretation of tests                              Minor surgery with identifiable risk                [] 1 undiagnosed new problem with uncertain prognosis    [] Discussion or management of test results                    elective major surgery                [] 1 acute illness with                systemic symptoms                                                                                                                                                              [] 1 acute complicated injury                                                                                                                                          Elective major surgery                                                                                                                                                                                                                                                                                                                                                                                                  5            [] 1 or more chronic illnesses with severe exacerbation,     [] Extensive(two from below)                                         [] High                                                                                                               [] Independent interpretation of results                         Drug therapy requiring intensive                                                                                                               []Discussion of management or test interpretation            monitoring                                                                                                                                                                                                       Decision to de-escalate care                 [] 1 acute or chronic illness or injury that poses a threat                                                                                               Decision regarding hospitalization                                                                                                                                                                                                                     release capsule Take 1 capsule by mouth daily 90 capsule 1    linaclotide (LINZESS) 290 MCG CAPS capsule Take 1 capsule by mouth every morning (before breakfast) 90 capsule 1    nitroGLYCERIN (NITROSTAT) 0.4 MG SL tablet place 1 tablet under the tongue if needed every 5 minutes for chest pain for 3 doses IF NO RELIEF AFTER THIRD DOSE CALL 911. 25 tablet 1    tamsulosin (FLOMAX) 0.4 MG capsule Take 1 capsule by mouth daily 90 capsule 1    candesartan (ATACAND) 32 MG tablet Take 1 tablet by mouth daily 90 tablet 1    vitamin D (ERGOCALCIFEROL) 1.25 MG (41010 UT) CAPS capsule take 1 capsule by mouth every week 12 capsule 1    ELIQUIS 5 MG TABS tablet take 1 tablet by mouth twice a day 180 tablet 1    albuterol sulfate HFA (VENTOLIN HFA) 108 (90 Base) MCG/ACT inhaler Inhale 2 puffs into the lungs every 6 hours as needed for Wheezing 1 each 5    predniSONE (DELTASONE) 10 MG tablet 4 tabs by mouth daily for 3 days, then 3 tabs daily for 3 days, then 2 tabs daily for 3 days, then 1 tab daily till gone. (Patient not taking: Reported on 5/10/2024) 30 tablet 0     No facility-administered medications prior to visit.       PFT obtained and findings reviewed, available imaging data and other lab results reviewed.  Patient had evidence of fibrosis on previous CT chest  HRCT was ordered for further evaluation of lung parenchyma  Patient is currently on Ventolin PRN  Ordered a course of Levaquin  Reviewed use of rescue vs controlling agents and potential side effects.  Reviewed use, techniques, schedule and side effects of all inhaled medications.  Refills were provided as requested.  Barriers to medication compliance addressed.  Patient to have prednisone and an antibiotic available for use during an exacerbation.  Repeat labs/imaging ordered prior to next appointment  PFT ordered.  Antibiotic and Prednisone were sent to pharmacy. We discussed that current symptoms are unlikely to be associated with an acute illness, and are more

## 2024-06-25 DIAGNOSIS — I10 ESSENTIAL HYPERTENSION: ICD-10-CM

## 2024-06-25 RX ORDER — HYDROCHLOROTHIAZIDE 12.5 MG/1
12.5 TABLET ORAL
Qty: 90 TABLET | Refills: 3 | Status: SHIPPED | OUTPATIENT
Start: 2024-06-25

## 2024-07-10 ENCOUNTER — HOSPITAL ENCOUNTER (OUTPATIENT)
Dept: RADIOLOGY | Facility: HOSPITAL | Age: 64
Discharge: HOME | End: 2024-07-10
Attending: FAMILY MEDICINE
Payer: COMMERCIAL

## 2024-07-10 VITALS — BODY MASS INDEX: 23.83 KG/M2 | HEIGHT: 65 IN | WEIGHT: 143.06 LBS

## 2024-07-10 DIAGNOSIS — Z12.31 ENCOUNTER FOR SCREENING MAMMOGRAM FOR MALIGNANT NEOPLASM OF BREAST: ICD-10-CM

## 2024-07-10 DIAGNOSIS — Z00.00 ANNUAL PHYSICAL EXAM: ICD-10-CM

## 2024-07-10 DIAGNOSIS — R73.03 PREDIABETES: ICD-10-CM

## 2024-07-10 PROCEDURE — 77063 BREAST TOMOSYNTHESIS BI: CPT | Mod: 26,,, | Performed by: RADIOLOGY

## 2024-07-10 PROCEDURE — 77067 SCR MAMMO BI INCL CAD: CPT | Mod: TC

## 2024-07-10 PROCEDURE — 77067 SCR MAMMO BI INCL CAD: CPT | Mod: 26,,, | Performed by: RADIOLOGY

## 2024-07-10 RX ORDER — METFORMIN HYDROCHLORIDE 500 MG/1
500 TABLET ORAL
Qty: 90 TABLET | Refills: 1 | Status: SHIPPED | OUTPATIENT
Start: 2024-07-10

## 2024-07-10 NOTE — TELEPHONE ENCOUNTER
No care due was identified.  [facility] Embedded Care Due Messages. Reference number: [id number].   7/10/2024 5:14:37 AM CDT

## 2024-07-10 NOTE — TELEPHONE ENCOUNTER
Refill Decision Note   María Julian  is requesting a refill authorization.  Brief Assessment and Rationale for Refill:  Approve     Medication Therapy Plan:        Comments:     Note composed:6:58 AM 07/10/2024

## 2024-08-08 ENCOUNTER — OFFICE VISIT (OUTPATIENT)
Dept: ALLERGY | Facility: CLINIC | Age: 64
End: 2024-08-08
Payer: COMMERCIAL

## 2024-08-08 VITALS
HEIGHT: 65 IN | HEART RATE: 76 BPM | WEIGHT: 141.56 LBS | BODY MASS INDEX: 23.58 KG/M2 | DIASTOLIC BLOOD PRESSURE: 80 MMHG | SYSTOLIC BLOOD PRESSURE: 122 MMHG

## 2024-08-08 DIAGNOSIS — J32.9 RECURRENT SINUSITIS: ICD-10-CM

## 2024-08-08 DIAGNOSIS — D83.9 CVID (COMMON VARIABLE IMMUNODEFICIENCY): Primary | ICD-10-CM

## 2024-08-08 DIAGNOSIS — J44.89 ASTHMA WITH COPD: ICD-10-CM

## 2024-08-08 DIAGNOSIS — R53.81 CHRONIC FATIGUE AND MALAISE: ICD-10-CM

## 2024-08-08 DIAGNOSIS — R53.82 CHRONIC FATIGUE AND MALAISE: ICD-10-CM

## 2024-08-08 DIAGNOSIS — E83.119 HEMOCHROMATOSIS, UNSPECIFIED HEMOCHROMATOSIS TYPE: ICD-10-CM

## 2024-08-08 DIAGNOSIS — D80.6 SPECIFIC ANTIBODY DEFICIENCY WITH NORMAL IG CONCENTRATION AND NORMAL NUMBER OF B CELLS: ICD-10-CM

## 2024-08-08 DIAGNOSIS — Z87.891 EX-CIGARETTE SMOKER: ICD-10-CM

## 2024-08-08 DIAGNOSIS — J40 BRONCHITIS: ICD-10-CM

## 2024-08-08 PROCEDURE — 99999 PR PBB SHADOW E&M-EST. PATIENT-LVL III: CPT | Mod: PBBFAC,,, | Performed by: SPECIALIST

## 2024-08-08 PROCEDURE — 99215 OFFICE O/P EST HI 40 MIN: CPT | Mod: S$GLB,,, | Performed by: SPECIALIST

## 2024-08-12 ENCOUNTER — OFFICE VISIT (OUTPATIENT)
Dept: CARDIOLOGY | Facility: CLINIC | Age: 64
End: 2024-08-12
Payer: COMMERCIAL

## 2024-08-12 VITALS
BODY MASS INDEX: 23.47 KG/M2 | OXYGEN SATURATION: 98 % | HEART RATE: 84 BPM | HEIGHT: 65 IN | WEIGHT: 140.88 LBS | SYSTOLIC BLOOD PRESSURE: 130 MMHG | DIASTOLIC BLOOD PRESSURE: 80 MMHG

## 2024-08-12 DIAGNOSIS — I25.10 ATHEROSCLEROSIS OF NATIVE CORONARY ARTERY OF NATIVE HEART WITHOUT ANGINA PECTORIS: ICD-10-CM

## 2024-08-12 DIAGNOSIS — J44.89 ASTHMA-COPD OVERLAP SYNDROME: ICD-10-CM

## 2024-08-12 DIAGNOSIS — I10 ESSENTIAL HYPERTENSION: Primary | ICD-10-CM

## 2024-08-12 DIAGNOSIS — J44.9 CHRONIC OBSTRUCTIVE PULMONARY DISEASE, UNSPECIFIED COPD TYPE: ICD-10-CM

## 2024-08-12 DIAGNOSIS — R23.3 EASY BRUISABILITY: ICD-10-CM

## 2024-08-12 DIAGNOSIS — I70.0 ABDOMINAL AORTIC ATHEROSCLEROSIS: ICD-10-CM

## 2024-08-12 DIAGNOSIS — I25.10 CORONARY ARTERY CALCIFICATION SEEN ON CAT SCAN: ICD-10-CM

## 2024-08-12 DIAGNOSIS — Z87.891 EX-CIGARETTE SMOKER: ICD-10-CM

## 2024-08-12 DIAGNOSIS — I25.10 CORONARY ARTERY DISEASE INVOLVING NATIVE CORONARY ARTERY OF NATIVE HEART WITHOUT ANGINA PECTORIS: ICD-10-CM

## 2024-08-12 PROCEDURE — 99214 OFFICE O/P EST MOD 30 MIN: CPT | Mod: S$GLB,,, | Performed by: INTERNAL MEDICINE

## 2024-08-12 PROCEDURE — 99999 PR PBB SHADOW E&M-EST. PATIENT-LVL IV: CPT | Mod: PBBFAC,,, | Performed by: INTERNAL MEDICINE

## 2024-08-12 NOTE — PROGRESS NOTES
Subjective:   Patient ID:  María Julian is a 64 y.o. female who presents for evaluation of Dizziness        Dizziness: no syncope, no palpitations and no chest pain.     8.12.2024  Normal stress echo after last visit, 6 mins on treadmill, 100% thr   No chest pain, + eli , copd though   Repeat bp better after arrival , reports white coat  Compliant with medication     1.11.2024  Had pneumonia after her last visit and couldn't schedule her stress echo   Comes in for six-month follow-up.  Doing well.  States blood pressure sometimes be running mostly higher 130s to 140 at home.  Compliant with medications.  Known to have coronary after sclerosis based on CT scan.  LDL not at goal.  Declines statins.      6.5.2023  Comes in for six-month follow-up.    Denies any chest pain.    No palpitations syncope presyncope   Her lipid panel is elevated.    Her CT scan of the chest showed coronary artery calcification.  However no chest pain  Her LDL is not at goal however she refused to take any statin or Zetia.    Her blood pressure is elevated today however she states at home it is 120/70 however she does not check it often.    She states that she took Sudafed also goes she had a recent upper respiratory infection.        12/05/2022.    Comes in for six-month follow-up.    She states that the Toprol that she was prescribed for palpitations did help with the palpitations however she was feeling very fatigued so she stopped it.    She is still on hydrochlorothiazide.    Her blood pressure runs at home between 120 and 150.    Her blood pressure today on my last check was 160/95.        6.2.2022  Comes in for follow up , overall she is doing better since last visit, since she states her back pain feels better after RFA  She states she has been having higher BP at home and on visit   Her echo didn't show low EF or cardiomyopathy, done since hx of hemochromatosis   Started on hctz last visit with better numbers overall  except for some higher   No chest pain, no dyspnea, no palpitations  Also under some work stress       .  60 yo female, ex smoker quit in 2018, 45 pqy, elevated BP without hx of HTN, BP ranges high normal to abnormal   She works in an office. She uses the stairs every day, 1 flight multiple times a day, without chest pain, dyspnea, syncope, presyncope, dizziness, headaches.   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  Pt denies any complications with general anesthesia    Recurrent upper respiratory infection (URI)        Past Surgical History:   Procedure Laterality Date    BONE SPUR EXCISION SHOULDER Right     CARPAL TUNNEL RELEASE Left      SECTION      x 2    CHOLECYSTECTOMY  2018    DILATION AND CURETTAGE OF UTERUS      EXAMINATION UNDER ANESTHESIA N/A 2021    Procedure: Exam Under Anesthesia;  Surgeon: Don Allen MD;  Location: [facility] OR;  Service: Oncology;  Laterality: N/A;    FOOT TENDON SURGERY Bilateral     HYSTERECTOMY  1999    fibroid    INJECTION OF ANESTHETIC AGENT AROUND MEDIAL BRANCH NERVES INNERVATING LUMBAR FACET JOINT Right 10/28/2021    Procedure: Block-nerve-medial branch-lumbar Right L3, 4,5 MBB with RN IV sedation;  Surgeon: Lei Copeland MD;  Location: [facility];  Service: Pain Management;  Laterality: Right;    INJECTION OF ANESTHETIC AGENT AROUND MEDIAL BRANCH NERVES INNERVATING LUMBAR FACET JOINT Bilateral 2021    Procedure: Block-nerve-medial branch-lumbar bilateral L3, 4,5 MBB RN IV sedation;  Surgeon: Lei Copeland  MD;  Location: [facility];  Service: Pain Management;  Laterality: Bilateral;    INJECTION OF ANESTHETIC AGENT AROUND MEDIAL BRANCH NERVES INNERVATING LUMBAR FACET JOINT Bilateral 3/14/2022    Procedure: Block-nerve-medial branch-lumbar L3, 4 5 NO STEROIDS RN IV sedation;  Surgeon: Lei Copeland MD;  Location: [facility];  Service: Pain Management;  Laterality: Bilateral;    INJECTION OF ANESTHETIC AGENT AROUND MEDIAL BRANCH NERVES INNERVATING LUMBAR FACET JOINT Bilateral 3/21/2022    Procedure: Block-nerve-medial branch-lumbar bilateral L3, 4,5 RN IV sedation NO STERIOD DIAGNOSTIC ONLY;  Surgeon: Lei Copeland MD;  Location: [facility];  Service: Pain Management;  Laterality: Bilateral;    LASER ABLATION N/A 2021    Procedure: ABLATION, USING LASER;  Surgeon: Don Allen MD;  Location: [facility] OR;  Service: Oncology;  Laterality: N/A;    MICRODISCECTOMY OF SPINE      OOPHORECTOMY Bilateral     RADIOFREQUENCY THERMOCOAGULATION Bilateral 2022    Procedure: RADIOFREQUENCY THERMAL COAGULATION bilateral L3, 4,5 mbb RFA RN IV sedation;  Surgeon: Lei Copeland MD;  Location: [facility];  Service: Pain Management;  Laterality: Bilateral;    TONSILLECTOMY      TRIGGER FINGER RELEASE Left     TUBAL LIGATION Bilateral        Social History     Tobacco Use    Smoking status: Former     Current packs/day: 0.00     Average packs/day: 1 pack/day for 43.7 years (43.7 ttl pk-yrs)     Types: Cigarettes     Start date: 1975     Quit date: 2018     Years since quittin.9    Smokeless tobacco: Never   Substance Use Topics    Alcohol use: Yes     Alcohol/week: 7.0 standard drinks of alcohol     Types: 7 Drinks containing 0.5 oz of alcohol per week     Comment: socially  No alcohol 72h prior to sx    Drug use: Never       Family History   Problem Relation Name Age of Onset    Malignant hypertension Mother Elis Black     Heart disease Mother Elis Black     Cancer Father Raymond Black         Liver  cancer    Diabetes Father Raymond Alexander     Ovarian cancer Sister Sharda Alexander     Alcohol abuse Sister Sharda Alexander     Early death Sister Sharda Alexander        Review of Systems   Cardiovascular:  Negative for chest pain, dyspnea on exertion, palpitations and syncope.   Genitourinary: Negative.    Neurological:  Positive for dizziness.       Current Outpatient Medications on File Prior to Visit   Medication Sig    albuterol (PROVENTIL/VENTOLIN HFA) 90 mcg/actuation inhaler INHALE 2 PUFFS INTO THE LUNGS EVERY 6 HOURS AS NEEDED. RESCUE    albuterol-ipratropium (DUO-NEB) 2.5 mg-0.5 mg/3 mL nebulizer solution USE 1 VIAL VIA NEBULIZER EVERY 6 HOURS AS NEEDED FOR WHEEZING OR SHORTNESS OF BREATH OR RESCUE    amLODIPine (NORVASC) 5 MG tablet Take 1 tablet (5 mg total) by mouth once daily.    budesonide (PULMICORT) 0.5 mg/2 mL nebulizer solution USE 2 ML(0.5 MG) VIA NEBULIZER TWICE DAILY    diphenhydrAMINE (BENADRYL) 25 mg capsule Take 25 mg by mouth. Before infusions    ezetimibe (ZETIA) 10 mg tablet Take 1 tablet (10 mg total) by mouth once daily.    gabapentin (NEURONTIN) 300 MG capsule Take 300 mg by mouth every evening.     hydroCHLOROthiazide (HYDRODIURIL) 12.5 MG Tab TAKE 1 TABLET BY MOUTH EVERY DAY    Immune Globulin G, IGG,-PRO-IGA 10 % injection, Privigen, (PRIVIGEN) 10 % Soln Inject 300 mLs (30 g total) into the vein every 28 days.    metFORMIN (GLUCOPHAGE) 500 MG tablet TAKE 1 TABLET(500 MG) BY MOUTH DAILY WITH BREAKFAST    nebulizer and compressor Beatrice Use to deliver nebulizer medications up to 6 times a day    predniSONE (DELTASONE) 10 MG tablet TAKE 1 TABLET BY MOUTH EVERY DAY AS NEEDED    TRELEGY ELLIPTA 200-62.5-25 mcg inhaler INHALE 1 PUFF INTO THE LUNGS EVERY DAY     No current facility-administered medications on file prior to visit.       Objective:   Objective:  Wt Readings from Last 3 Encounters:   08/12/24 63.9 kg (140 lb 14 oz)   08/08/24 64.2 kg (141 lb 8.6 oz)   07/10/24 64.9 kg (143 lb 1.3 oz)     Temp  "Readings from Last 3 Encounters:   05/08/24 98.8 °F (37.1 °C)   01/25/24 98.1 °F (36.7 °C)   10/19/23 97.9 °F (36.6 °C)     BP Readings from Last 3 Encounters:   08/12/24 130/80   08/08/24 122/80   05/08/24 127/77     Pulse Readings from Last 3 Encounters:   08/12/24 84   08/08/24 76   05/08/24 83       Physical Exam  Vitals reviewed.   Constitutional:       Appearance: She is well-developed.   Neck:      Vascular: No carotid bruit.   Cardiovascular:      Rate and Rhythm: Normal rate and regular rhythm.      Pulses: Intact distal pulses.      Heart sounds: Normal heart sounds. No murmur heard.  Pulmonary:      Breath sounds: Normal breath sounds.   Neurological:      Mental Status: She is oriented to person, place, and time.         Lab Results   Component Value Date    CHOL 163 04/25/2024    CHOL 160 04/24/2023    CHOL 197 04/14/2022     Lab Results   Component Value Date    HDL 66 04/25/2024    HDL 52 04/24/2023    HDL 64 04/14/2022     Lab Results   Component Value Date    LDLCALC 84.0 04/25/2024    LDLCALC 93.8 04/24/2023    LDLCALC 115.6 04/14/2022     Lab Results   Component Value Date    TRIG 65 04/25/2024    TRIG 71 04/24/2023    TRIG 87 04/14/2022     Lab Results   Component Value Date    CHOLHDL 40.5 04/25/2024    CHOLHDL 32.5 04/24/2023    CHOLHDL 32.5 04/14/2022       Chemistry        Component Value Date/Time     04/25/2024 0952    K 3.7 04/25/2024 0952     04/25/2024 0952    CO2 22 (L) 04/25/2024 0952    BUN 19 04/25/2024 0952    CREATININE 1.0 04/25/2024 0952    GLU 88 04/25/2024 0952        Component Value Date/Time    CALCIUM 9.7 04/25/2024 0952    ALKPHOS 68 04/25/2024 0952    AST 18 04/25/2024 0952    ALT 9 (L) 04/25/2024 0952    BILITOT 0.8 04/25/2024 0952    ESTGFRAFRICA >60.0 04/14/2022 0833    EGFRNONAA >60.0 04/14/2022 0833          Lab Results   Component Value Date    TSH 1.368 04/25/2024     No results found for: "INR", "PROTIME"  Lab Results   Component Value Date    WBC 5.58 " 04/25/2024    HGB 16.1 (H) 04/25/2024    HCT 45.6 04/25/2024     (H) 04/25/2024     04/25/2024     BNP  @LABRCNTIP(BNP,BNPTRIAGEBLO)@  CrCl cannot be calculated (Patient's most recent lab result is older than the maximum 7 days allowed.).     Imaging:  ======  No results found for this or any previous visit.    No results found for this or any previous visit.    No results found for this or any previous visit.    No results found for this or any previous visit.  Results for orders placed during the hospital encounter of 06/15/21    Echo    Interpretation Summary  · The left ventricle is normal in size with normal systolic function.  · The estimated ejection fraction is 65%.  · Normal left ventricular diastolic function.  · Normal right ventricular size with normal right ventricular systolic function.  · The ascending aorta is mildly dilated.  · The sinuses of Valsalva is mildly dilated.  · Mild mitral regurgitation.  · Mild aortic regurgitation.      Diagnostic Results:  ECG: Reviewed, no dynamic changes , no evidence of prior MI   Sinus arrhyhtmia, left axis deviation     The 10-year ASCVD risk score (Vinh DK, et al., 2019) is: 5.7%    Values used to calculate the score:      Age: 64 years      Sex: Female      Is Non- : No      Diabetic: No      Tobacco smoker: No      Systolic Blood Pressure: 130 mmHg      Is BP treated: Yes      HDL Cholesterol: 66 mg/dL      Total Cholesterol: 163 mg/dL    Assessment and Plan:   Essential hypertension    Coronary artery calcification seen on CAT scan    Abdominal aortic atherosclerosis    Atherosclerosis of native coronary artery of native heart without angina pectoris    Chronic obstructive pulmonary disease, unspecified COPD type    Ex-cigarette smoker    Asthma-COPD overlap syndrome    Coronary artery disease involving native coronary artery of native heart without angina pectoris  -     CV Ultrasound Bilateral Doppler Carotid;  Future    Easy bruisability             Continue HCTZ Norvasc.  Tolerating Zetia, LDL better  Did not tolerate Toprol the past.    Continue to exercise.    Low-salt diet.     Blood pressure not at goal however she states this is due to her taking Sudafed today.    Normal stress echo   Normal bp at home   Carotid US screening , given CAD  Follow up in 6 months

## 2024-08-21 ENCOUNTER — HOSPITAL ENCOUNTER (OUTPATIENT)
Dept: CARDIOLOGY | Facility: HOSPITAL | Age: 64
Discharge: HOME | End: 2024-08-21
Attending: INTERNAL MEDICINE
Payer: COMMERCIAL

## 2024-08-21 VITALS
WEIGHT: 140 LBS | DIASTOLIC BLOOD PRESSURE: 76 MMHG | BODY MASS INDEX: 23.32 KG/M2 | SYSTOLIC BLOOD PRESSURE: 125 MMHG | HEIGHT: 65 IN

## 2024-08-21 DIAGNOSIS — I25.10 CORONARY ARTERY DISEASE INVOLVING NATIVE CORONARY ARTERY OF NATIVE HEART WITHOUT ANGINA PECTORIS: ICD-10-CM

## 2024-08-21 LAB
LEFT ARM DIASTOLIC BLOOD PRESSURE: 74 MMHG
LEFT ARM SYSTOLIC BLOOD PRESSURE: 123 MMHG
LEFT CBA DIAS: 12 CM/S
LEFT CBA SYS: 61 CM/S
LEFT CCA DIST DIAS: 14 CM/S
LEFT CCA DIST SYS: 56 CM/S
LEFT CCA MID DIAS: 16 CM/S
LEFT CCA MID SYS: 82 CM/S
LEFT CCA PROX DIAS: 16 CM/S
LEFT CCA PROX SYS: 77 CM/S
LEFT ECA DIAS: 14 CM/S
LEFT ECA SYS: 90 CM/S
LEFT ICA DIST DIAS: 29 CM/S
LEFT ICA DIST SYS: 107 CM/S
LEFT ICA MID DIAS: 28 CM/S
LEFT ICA MID SYS: 103 CM/S
LEFT ICA PROX DIAS: 18 CM/S
LEFT ICA PROX SYS: 59 CM/S
LEFT VERTEBRAL DIAS: 9 CM/S
LEFT VERTEBRAL SYS: 46 CM/S
OHS CV CAROTID RIGHT ICA EDV HIGHEST: 30
OHS CV CAROTID ULTRASOUND LEFT ICA/CCA RATIO: 1.91
OHS CV CAROTID ULTRASOUND RIGHT ICA/CCA RATIO: 2.31
OHS CV PV CAROTID LEFT HIGHEST CCA: 82
OHS CV PV CAROTID LEFT HIGHEST ICA: 107
OHS CV PV CAROTID RIGHT HIGHEST CCA: 73
OHS CV PV CAROTID RIGHT HIGHEST ICA: 125
OHS CV US CAROTID LEFT HIGHEST EDV: 29
RIGHT ARM DIASTOLIC BLOOD PRESSURE: 76 MMHG
RIGHT ARM SYSTOLIC BLOOD PRESSURE: 125 MMHG
RIGHT CBA DIAS: 7 CM/S
RIGHT CBA SYS: 38 CM/S
RIGHT CCA DIST DIAS: 10 CM/S
RIGHT CCA DIST SYS: 54 CM/S
RIGHT CCA MID DIAS: 8 CM/S
RIGHT CCA MID SYS: 62 CM/S
RIGHT CCA PROX DIAS: 7 CM/S
RIGHT CCA PROX SYS: 73 CM/S
RIGHT ECA DIAS: 8 CM/S
RIGHT ECA SYS: 93 CM/S
RIGHT ICA DIST DIAS: 19 CM/S
RIGHT ICA DIST SYS: 63 CM/S
RIGHT ICA MID DIAS: 30 CM/S
RIGHT ICA MID SYS: 125 CM/S
RIGHT ICA PROX DIAS: 20 CM/S
RIGHT ICA PROX SYS: 67 CM/S
RIGHT VERTEBRAL DIAS: 9 CM/S
RIGHT VERTEBRAL SYS: 67 CM/S

## 2024-08-21 PROCEDURE — 93880 EXTRACRANIAL BILAT STUDY: CPT

## 2024-08-21 PROCEDURE — 93880 EXTRACRANIAL BILAT STUDY: CPT | Mod: 26,,, | Performed by: INTERNAL MEDICINE

## 2024-09-12 DIAGNOSIS — J32.9 RECURRENT SINUSITIS: ICD-10-CM

## 2024-09-12 DIAGNOSIS — J40 BRONCHITIS: ICD-10-CM

## 2024-09-12 DIAGNOSIS — D83.9 COMMON VARIABLE IMMUNODEFICIENCY: ICD-10-CM

## 2024-09-13 RX ORDER — PREDNISONE 10 MG/1
TABLET ORAL
Qty: 30 TABLET | Refills: 0 | Status: SHIPPED | OUTPATIENT
Start: 2024-09-13

## 2024-09-24 DIAGNOSIS — J40 BRONCHITIS: ICD-10-CM

## 2024-09-24 DIAGNOSIS — J44.89 ASTHMA-COPD OVERLAP SYNDROME: ICD-10-CM

## 2024-09-24 RX ORDER — BUDESONIDE 0.5 MG/2ML
INHALANT ORAL
Qty: 120 ML | Refills: 2 | Status: SHIPPED | OUTPATIENT
Start: 2024-09-24

## 2024-10-17 ENCOUNTER — TELEPHONE (OUTPATIENT)
Dept: ALLERGY | Facility: CLINIC | Age: 64
End: 2024-10-17
Payer: COMMERCIAL

## 2024-10-19 DIAGNOSIS — J40 BRONCHITIS: ICD-10-CM

## 2024-10-19 DIAGNOSIS — D83.9 COMMON VARIABLE IMMUNODEFICIENCY: ICD-10-CM

## 2024-10-19 DIAGNOSIS — J32.9 RECURRENT SINUSITIS: ICD-10-CM

## 2024-10-19 RX ORDER — PREDNISONE 10 MG/1
TABLET ORAL
Qty: 30 TABLET | Refills: 0 | Status: SHIPPED | OUTPATIENT
Start: 2024-10-19

## 2024-11-11 LAB — CRC RECOMMENDATION EXT: NORMAL

## 2024-11-14 ENCOUNTER — OFFICE VISIT (OUTPATIENT)
Dept: ALLERGY | Facility: CLINIC | Age: 64
End: 2024-11-14
Payer: COMMERCIAL

## 2024-11-14 VITALS
WEIGHT: 143.75 LBS | HEART RATE: 72 BPM | DIASTOLIC BLOOD PRESSURE: 82 MMHG | SYSTOLIC BLOOD PRESSURE: 136 MMHG | BODY MASS INDEX: 23.92 KG/M2 | TEMPERATURE: 99 F

## 2024-11-14 DIAGNOSIS — D80.6 SPECIFIC ANTIBODY DEFICIENCY WITH NORMAL IG CONCENTRATION AND NORMAL NUMBER OF B CELLS: ICD-10-CM

## 2024-11-14 DIAGNOSIS — Z87.891 EX-CIGARETTE SMOKER: ICD-10-CM

## 2024-11-14 DIAGNOSIS — J32.9 RECURRENT SINUSITIS: ICD-10-CM

## 2024-11-14 DIAGNOSIS — D83.9 CVID (COMMON VARIABLE IMMUNODEFICIENCY): Primary | ICD-10-CM

## 2024-11-14 DIAGNOSIS — J40 BRONCHITIS: ICD-10-CM

## 2024-11-14 DIAGNOSIS — E83.119 HEMOCHROMATOSIS, UNSPECIFIED HEMOCHROMATOSIS TYPE: ICD-10-CM

## 2024-11-14 DIAGNOSIS — J44.89 ASTHMA-COPD OVERLAP SYNDROME: ICD-10-CM

## 2024-11-14 DIAGNOSIS — R53.81 MALAISE AND FATIGUE: ICD-10-CM

## 2024-11-14 DIAGNOSIS — R53.83 MALAISE AND FATIGUE: ICD-10-CM

## 2024-11-14 PROCEDURE — 99999 PR PBB SHADOW E&M-EST. PATIENT-LVL IV: CPT | Mod: PBBFAC,,, | Performed by: SPECIALIST

## 2024-11-14 PROCEDURE — 99215 OFFICE O/P EST HI 40 MIN: CPT | Mod: S$GLB,,, | Performed by: SPECIALIST

## 2024-11-14 RX ORDER — NAPROXEN SODIUM 220 MG/1
81 TABLET, FILM COATED ORAL DAILY
COMMUNITY

## 2024-11-14 NOTE — PROGRESS NOTES
Subjective:       Patient ID: María Julian is a 64 y.o. female.    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 As a result gets recurrent sino- bronchitis.  Doing great on monthly IVIG infusions.    HPI:   female- 64 - year - old lady- for follow up on CVID / Specific Antibody deficien    She used to get recurrent sino- pulmonary infections and pneumonia requiring ER visits and hospitalizations twice a year prior to   Initiation on monthly IVIG infusions-- about 15 years ago.  IVIG had improved the quality of her life and prevented recurrent infections. Breakthrough infections are promptly treated.      She is an ex- cigarette smoker. HAS CHRONIC COUGH ASTHMA- COPD and is on everyday Trelegy Ellipta.     Avoiding red meat- due to recurrent colonic -- 8- 10- polyps.  Annual colonoscopy and biopsy reveals pre cancerous polyps.   -- by William Stephens MD-- Gastroenterologist.  Employed by [employer]. No ongoing allergens or irritants exposure.                  Outpatient Medications Marked as Taking for the 11/14/24 encounter (Office Visit) with Dez Galindo MD   Medication Sig Dispense Refill    albuterol (PROVENTIL/VENTOLIN HFA) 90 mcg/actuation inhaler INHALE 2 PUFFS INTO THE LUNGS EVERY 6 HOURS AS NEEDED. RESCUE 8.5 g 4    amLODIPine (NORVASC) 5 MG tablet Take 1 tablet (5 mg total) by mouth once daily. 30 tablet 11    aspirin 81 MG Chew Take 81 mg by mouth once daily.      diphenhydrAMINE (BENADRYL) 25 mg capsule Take 25 mg by mouth. Before infusions      ezetimibe (ZETIA) 10 mg tablet Take 1 tablet (10 mg total) by mouth once daily. 30 tablet 11    gabapentin (NEURONTIN) 300 MG capsule Take 300 mg by mouth every evening.       hydroCHLOROthiazide (HYDRODIURIL) 12.5 MG Tab TAKE 1 TABLET BY MOUTH EVERY DAY 90 tablet 3    Immune Globulin G, IGG,-PRO-IGA 10 % injection, Privigen, (PRIVIGEN) 10 % Soln Inject 300  mLs (30 g total) into the vein every 28 days. 300 mL 11    metFORMIN (GLUCOPHAGE) 500 MG tablet TAKE 1 TABLET(500 MG) BY MOUTH DAILY WITH BREAKFAST 90 tablet 1    predniSONE (DELTASONE) 10 MG tablet TAKE 1 TABLET BY MOUTH EVERY DAY AS NEEDED 30 tablet 0    TRELEGY ELLIPTA 200-62.5-25 mcg inhaler INHALE 1 PUFF INTO THE LUNGS EVERY DAY 60 each 7        Erythromycin, Macrolide antibiotics, Iodine and iodide containing products, and Levofloxacin     Past Medical History:   Diagnosis Date    Abnormal Pap smear of cervix     Allergy     Asthma     COPD (chronic obstructive pulmonary disease)     Malignant hyperthermia     pt's mother has hx of MH.  Pt denies any complications with general anesthesia    Recurrent upper respiratory infection (URI)        Family History   Problem Relation Name Age of Onset    Malignant hypertension Mother Elis Black     Heart disease Mother Elis Black     Cancer Father Raymond Black         Liver cancer    Diabetes Father Raymond Black     Ovarian cancer Sister Sharda Black     Alcohol abuse Sister Sharda Black     Early death Sister Sharda Black        Environmental History: Dust Mite Controls: Dust mite controls are already in place.     Review of Systems   Constitutional:  Positive for fatigue.   HENT:  Positive for congestion, postnasal drip and rhinorrhea.    Eyes:  Positive for itching.   Respiratory:  Positive for cough.    Cardiovascular: Negative.    Gastrointestinal: Negative.    Endocrine: Negative.    Genitourinary: Negative.    Musculoskeletal: Negative.    Skin: Negative.    Allergic/Immunologic: Negative.    Neurological: Negative.    Hematological: Negative.    Psychiatric/Behavioral: Negative.       Objective:     Visit Vitals  /82 (BP Location: Left arm, Patient Position: Sitting)   Pulse 72   Temp 98.8 °F (37.1 °C)   Wt 65.2 kg (143 lb 11.8 oz)   BMI 23.92 kg/m²       Physical Exam  Vitals and nursing note reviewed.   Constitutional:       Appearance: Normal appearance.  She is normal weight.   HENT:      Head: Normocephalic and atraumatic.      Right Ear: Ear canal and external ear normal.      Left Ear: Tympanic membrane, ear canal and external ear normal.      Nose: Congestion and rhinorrhea present.      Mouth/Throat:      Mouth: Mucous membranes are dry.      Pharynx: Oropharynx is clear.   Eyes:      Conjunctiva/sclera: Conjunctivae normal.      Pupils: Pupils are equal, round, and reactive to light.   Cardiovascular:      Rate and Rhythm: Normal rate and regular rhythm.      Pulses: Normal pulses.      Heart sounds: Normal heart sounds.   Pulmonary:      Effort: Pulmonary effort is normal.      Breath sounds: Normal breath sounds.   Abdominal:      General: Abdomen is flat. Bowel sounds are normal.      Palpations: Abdomen is soft.   Musculoskeletal:         General: Normal range of motion.      Cervical back: Normal range of motion and neck supple.   Skin:     General: Skin is warm and dry.      Capillary Refill: Capillary refill takes less than 2 seconds.   Neurological:      General: No focal deficit present.      Mental Status: She is alert and oriented to person, place, and time. Mental status is at baseline.   Psychiatric:         Mood and Affect: Mood normal.         Behavior: Behavior normal.         Thought Content: Thought content normal.         Judgment: Judgment normal.       Assessment:      1. CVID (common variable immunodeficiency)    2. 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 Recurrent sinusitis    4. Bronchitis    5. Malaise and fatigue    6. Ex-cigarette smoker    7. Asthma-COPD overlap syndrome    8. Hemochromatosis, unspecified hemochromatosis type    9       Levaquin caused tendonitis.  10     Recurrent colonic polyps- gets yearly colonoscopy by William Stephens MD    Plan:     Treat all infections with Azithromycin 250 mg OR AUGMENTIN  875 mg bid OR DOXYCYCLINE.- 100 mg bid  Plus Prednisone 20 mg qd for  3- 5 days  MONTHLY IVIG infusions-- PRIVIGEN 10 % - 30 Grams- 300 ml-   Infused over 4 hours.  Monitor for short and long term sequelae of CVID.  Monitor trough IgG  q 4 MONTHS.-- 1146 mg/ dl on 07- 19- 2024  -----------------------------------------------------------------------------------------------------  Trelegy Ellipta 200 - 62.5- 25-- one puff daily.  Proair HFA 90 mcg 2 puffs tid prn.  As needed , Duoneb plus Budesonide 0.5 mg bid.  Reviewed inhaler technique.  ----------------------------------------------------------------------  HAD Arexvy VACCINE  Annual influenza vaccine.  Had 5 doses of COVID vaccine.  ---------------------------------------------------------  Follow up with PCP David Ochoa and SAMI love MD-- FOLLOW UP FOR HEMOCHROMATOSIS.  Follow up in 3- 4 months.                Problems Address                                                 Amount and/or Complexity                                                                      Risk       3           [] 2 or more self-limited or minor problems                      [] Limited                                                                        [] Low                  [] 1 stable chronic illness                                                  Any combination of the two                                               OTC drugs                  []Acute, uncomplicated illness or injury                            Review of prior external notes from unique source           Minor surgery with no risk factors                                                                                                               [] 1 []2  []3+                                                                                                              Review of results from each unique test                                                                                                               [] 1 []2  [] 3+                                                                                                               Order of each unique test                                                                                                               [] 1 []2  [] 3+                                                                                                              Or                                                                                                             [] Assessment requiring an independent historian      4            [] One or more chronic illness with exacerbation,              [] Moderate                                                                      [] Moderate                 Progression, or side effects of treatment                            -test documents or independent historians                        Prescription drug management                []  2 or more sable chronic illnesses                                    [] Independent interpretation of tests                              Minor surgery with identifiable risk                [] 1 undiagnosed new problem with uncertain prognosis    [] Discussion or management of test results                    elective major surgery                [] 1 acute illness with                systemic symptoms                                                                                                                                                              [] 1 acute complicated injury                                                                                                                                          Elective major surgery                                                                                                                                                                                                                                                                                                                                                                                                   5            [] 1 or more chronic illnesses with severe exacerbation,     [] Extensive(two from below)                                         [] High                                                                                                               [] Independent interpretation of results                         Drug therapy requiring intensive                                                                                                               []Discussion of management or test interpretation           monitoring                                                                                                                                                                                                       Decision to de-escalate care                 [] 1 acute or chronic illness or injury that poses a threat                                                                                               Decision regarding hospitalization

## 2024-11-23 DIAGNOSIS — I25.10 CORONARY ARTERY CALCIFICATION SEEN ON CAT SCAN: ICD-10-CM

## 2024-11-23 DIAGNOSIS — J44.9 CHRONIC OBSTRUCTIVE PULMONARY DISEASE, UNSPECIFIED COPD TYPE: ICD-10-CM

## 2024-11-23 DIAGNOSIS — J45.40 MODERATE PERSISTENT ASTHMA WITHOUT COMPLICATION: ICD-10-CM

## 2024-11-25 RX ORDER — FLUTICASONE FUROATE, UMECLIDINIUM BROMIDE AND VILANTEROL TRIFENATATE 200; 62.5; 25 UG/1; UG/1; UG/1
POWDER RESPIRATORY (INHALATION)
Qty: 60 EACH | Refills: 7 | Status: SHIPPED | OUTPATIENT
Start: 2024-11-25

## 2024-11-26 RX ORDER — EZETIMIBE 10 MG/1
10 TABLET ORAL
Qty: 30 TABLET | Refills: 11 | Status: SHIPPED | OUTPATIENT
Start: 2024-11-26

## 2024-12-02 ENCOUNTER — TELEPHONE (OUTPATIENT)
Dept: OBSTETRICS AND GYNECOLOGY | Facility: CLINIC | Age: 64
End: 2024-12-02
Payer: COMMERCIAL

## 2024-12-02 ENCOUNTER — OFFICE VISIT (OUTPATIENT)
Dept: OBSTETRICS AND GYNECOLOGY | Facility: CLINIC | Age: 64
End: 2024-12-02
Payer: COMMERCIAL

## 2024-12-02 VITALS
BODY MASS INDEX: 23.92 KG/M2 | WEIGHT: 143.75 LBS | SYSTOLIC BLOOD PRESSURE: 134 MMHG | DIASTOLIC BLOOD PRESSURE: 62 MMHG

## 2024-12-02 DIAGNOSIS — D07.1 VIN III (VULVAR INTRAEPITHELIAL NEOPLASIA III): ICD-10-CM

## 2024-12-02 DIAGNOSIS — Z12.31 ENCOUNTER FOR SCREENING MAMMOGRAM FOR BREAST CANCER: ICD-10-CM

## 2024-12-02 DIAGNOSIS — Z78.0 POSTMENOPAUSAL: ICD-10-CM

## 2024-12-02 DIAGNOSIS — Z01.419 ENCOUNTER FOR GYNECOLOGICAL EXAMINATION WITHOUT ABNORMAL FINDING: Primary | ICD-10-CM

## 2024-12-02 PROCEDURE — 99999 PR PBB SHADOW E&M-EST. PATIENT-LVL III: CPT | Mod: PBBFAC,,, | Performed by: NURSE PRACTITIONER

## 2024-12-02 PROCEDURE — 99396 PREV VISIT EST AGE 40-64: CPT | Mod: S$GLB,,, | Performed by: NURSE PRACTITIONER

## 2024-12-02 NOTE — PROGRESS NOTES
CC: Well woman exam    María Julian is a 64 y.o. female  presents for a well woman exam.      Mmg done  - negative  Last vaginal pap   - negative pap and HPV    Health Maintenance Topics with due status: Not Due       Topic Last Completion Date    TETANUS VACCINE 2023    Colorectal Cancer Screening 2023    LDCT Lung Screen 2024    Hemoglobin A1c (Prediabetes) 2024    Lipid Panel 2024    Mammogram 07/10/2024      Past Medical History:   Diagnosis Date    Abnormal Pap smear of cervix     Allergy     Asthma     COPD (chronic obstructive pulmonary disease)     Malignant hyperthermia     pt's mother has hx of MH.  Pt denies any complications with general anesthesia    Recurrent upper respiratory infection (URI)      Past Surgical History:   Procedure Laterality Date    BONE SPUR EXCISION SHOULDER Right     CARPAL TUNNEL RELEASE Left      SECTION      x 2    CHOLECYSTECTOMY  2018    DILATION AND CURETTAGE OF UTERUS      EXAMINATION UNDER ANESTHESIA N/A 2021    Procedure: Exam Under Anesthesia;  Surgeon: Don Allen MD;  Location: [facility] OR;  Service: Oncology;  Laterality: N/A;    FOOT TENDON SURGERY Bilateral     HYSTERECTOMY  1999    fibroid    INJECTION OF ANESTHETIC AGENT AROUND MEDIAL BRANCH NERVES INNERVATING LUMBAR FACET JOINT Right 10/28/2021    Procedure: Block-nerve-medial branch-lumbar Right L3, 4,5 MBB with RN IV sedation;  Surgeon: Lei Copeland MD;  Location: [facility] PAIN MGT;  Service: Pain Management;  Laterality: Right;    INJECTION OF ANESTHETIC AGENT AROUND MEDIAL BRANCH NERVES INNERVATING LUMBAR FACET JOINT Bilateral 2021    Procedure: Block-nerve-medial branch-lumbar bilateral L3, 4,5 MBB RN IV sedation;  Surgeon: Lei Copeland MD;  Location: [facility] PAIN MGT;  Service: Pain Management;  Laterality: Bilateral;    INJECTION OF ANESTHETIC AGENT AROUND MEDIAL BRANCH NERVES INNERVATING LUMBAR FACET JOINT Bilateral 3/14/2022    Procedure:  Block-nerve-medial branch-lumbar L3, 4 5 NO STEROIDS RN IV sedation;  Surgeon: Lei Copeland MD;  Location: [facility] PAIN MGT;  Service: Pain Management;  Laterality: Bilateral;    INJECTION OF ANESTHETIC AGENT AROUND MEDIAL BRANCH NERVES INNERVATING LUMBAR FACET JOINT Bilateral 3/21/2022    Procedure: Block-nerve-medial branch-lumbar bilateral L3, 4,5 RN IV sedation NO STERIOD DIAGNOSTIC ONLY;  Surgeon: Lei Copeland MD;  Location: [facility] PAIN MGT;  Service: Pain Management;  Laterality: Bilateral;    LASER ABLATION N/A 2021    Procedure: ABLATION, USING LASER;  Surgeon: Don Allen MD;  Location: [facility] OR;  Service: Oncology;  Laterality: N/A;    MICRODISCECTOMY OF SPINE      OOPHORECTOMY Bilateral     RADIOFREQUENCY THERMOCOAGULATION Bilateral 2022    Procedure: RADIOFREQUENCY THERMAL COAGULATION bilateral L3, 4,5 mbb RFA RN IV sedation;  Surgeon: Lei Copeland MD;  Location: [facility] PAIN MGT;  Service: Pain Management;  Laterality: Bilateral;    TONSILLECTOMY      TRIGGER FINGER RELEASE Left     TUBAL LIGATION Bilateral      Family History   Problem Relation Name Age of Onset    Malignant hypertension Mother Elis Black     Heart disease Mother Elis Black     Cancer Father Raymond Black         Liver cancer    Diabetes Father Raymond Black     Ovarian cancer Sister Sharda Black     Alcohol abuse Sister Sharda Black     Early death Sister Sharda Black      Social History     Tobacco Use    Smoking status: Former     Current packs/day: 0.00     Average packs/day: 1 pack/day for 43.7 years (43.7 ttl pk-yrs)     Types: Cigarettes     Start date: 1975     Quit date: 2018     Years since quittin.2    Smokeless tobacco: Never   Substance Use Topics    Alcohol use: Yes     Alcohol/week: 7.0 standard drinks of alcohol     Types: 7 Drinks containing 0.5 oz of alcohol per week     Comment: socially  No alcohol 72h prior to sx    Drug use: Never     OB History          3    Para   2    Term   2             AB   1    Living   2         SAB        IAB        Ectopic        Multiple        Live Births                     /62 (Patient Position: Sitting)   Wt 65.2 kg (143 lb 11.8 oz)   BMI 23.92 kg/m²     ROS:  Per hpi    PE:   APPEARANCE: Well nourished, well developed, in no acute distress.  AFFECT: WNL, alert and oriented x 3.  SKIN: No acne or hirsutism.  NECK: Neck symmetric without masses or thyromegaly.  NODES: No inguinal, cervical, axillary or femoral lymph node enlargement.  CHEST: Good respiratory effort.   ABDOMEN: Soft. No tenderness or masses. No hepatosplenomegaly. No hernias.  BREASTS: Symmetrical, no skin changes or visible lesions. No palpable masses, nipple discharge bilaterally.  PELVIC: Normal external female genitalia without lesions - no lesions noted on this exam . Normal hair distribution. Adequate perineal body, normal urethral meatus. Vagina atrophic without lesions or discharge. No significant cystocele or rectocele. Bimanual exam shows uterus and cervix to be surgically absent. Adnexa without masses or tenderness.    Physical Exam     1. Encounter for gynecological examination without abnormal finding        2. Postmenopausal        3. CELENA III (vulvar intraepithelial neoplasia III)        4. Encounter for screening mammogram for breast cancer  CANCELED: Mammo Digital Screening Bilat w/ Earl       and PLAN:    María was seen today for well woman.    Diagnoses and all orders for this visit:    Encounter for gynecological examination without abnormal finding    Postmenopausal    CELENA III (vulvar intraepithelial neoplasia III)    Encounter for screening mammogram for breast cancer  -     Cancel: Mammo Digital Screening Bilat w/ Earl; Future     Will pap every other year since issues were external vulva not vaginal and has had hysterectomy with normal pap     Patient was counseled today on A.C.S. Pap guidelines and recommendations for yearly pelvic exams, mammograms and monthly self  breast exams; to see her PCP for other health maintenance.

## 2024-12-02 NOTE — TELEPHONE ENCOUNTER
----- Message from Diandra sent at 12/2/2024  2:56 PM CST -----  Type:  Needs Medical Advice    Who Called: María Julian      Symptoms (please be specific): -   How long has patient had these symptoms:  -  Pharmacy name and phone #:  -  Would the patient rather a call back or a response via [facility]?   Best Call Back Number: [phone]     Additional Information: pt is in route to appt ( was in mtg at work) please call to know if she sheouod still com in

## 2024-12-09 ENCOUNTER — PATIENT OUTREACH (OUTPATIENT)
Dept: ADMINISTRATIVE | Facility: HOSPITAL | Age: 64
End: 2024-12-09
Payer: COMMERCIAL

## 2024-12-09 NOTE — LETTER
[id number]    AUTHORIZATION FOR RELEASE OF   CONFIDENTIAL INFORMATION    Dear Dr. William Stephens,    We are seeing María Julian, date of birth 1960, in the clinic at [facility] INTERNAL MEDICINE. David Ochoa MD is the patient's PCP. María Julian has an outstanding lab/procedure at the time we reviewed her chart. In order to help keep her health information updated, she has authorized us to request the following medical record(s):            ( x ) 2024 COLONOSCOPY & RECALL RECOMMENDATION     ** Repeat colonoscopy in ______ years           Please fax/email records to:  Fax #: [phone]  Email: [email or web address]     If you have any questions, please contact    JOEL Cespedes at [phone]          Patient Name: María Julian  : 1960  Patient Phone #: [phone]

## 2024-12-09 NOTE — PROGRESS NOTES
Manually uploaded COLON PATHOLOGY to media  MAILE faxed to Dr. William Stephens 1x to request 11/11/2024 colonoscopy report and recall recommendation. Reminder set.

## 2024-12-28 DIAGNOSIS — D83.9 COMMON VARIABLE IMMUNODEFICIENCY: ICD-10-CM

## 2024-12-28 DIAGNOSIS — J40 BRONCHITIS: ICD-10-CM

## 2024-12-28 DIAGNOSIS — J32.9 RECURRENT SINUSITIS: ICD-10-CM

## 2024-12-29 DIAGNOSIS — I10 ESSENTIAL HYPERTENSION: ICD-10-CM

## 2024-12-30 RX ORDER — PREDNISONE 10 MG/1
TABLET ORAL
Qty: 30 TABLET | Refills: 0 | Status: SHIPPED | OUTPATIENT
Start: 2024-12-30

## 2024-12-31 RX ORDER — AMLODIPINE BESYLATE 5 MG/1
5 TABLET ORAL
Qty: 30 TABLET | Refills: 11 | Status: SHIPPED | OUTPATIENT
Start: 2024-12-31

## 2025-01-09 DIAGNOSIS — R73.03 PREDIABETES: ICD-10-CM

## 2025-01-09 RX ORDER — METFORMIN HYDROCHLORIDE 500 MG/1
500 TABLET ORAL
Qty: 90 TABLET | Refills: 3 | Status: SHIPPED | OUTPATIENT
Start: 2025-01-09

## 2025-01-09 NOTE — TELEPHONE ENCOUNTER
Refill Routing Note   Medication(s) are not appropriate for processing by [facility] for the following reason(s):        Required labs outdated    [facility] action(s):  Defer     Requires labs : Yes             Appointments  past 12m or future 3m with PCP    Date Provider   Last Visit   4/25/2024 David Ochoa MD   Next Visit   Visit date not found David Ochoa MD   ED visits in past 90 days: 0        Note composed:5:05 PM 01/09/2025           Leyla Lizama

## 2025-01-09 NOTE — TELEPHONE ENCOUNTER
Care Due:                  Date            Visit Type   Department     Provider  --------------------------------------------------------------------------------                                MYCHART                              ANNUAL                              CHECKUP/PHY  [facility] INTERNAL  Last Visit: 04-      [facility]       David Ochoa  Next Visit: None Scheduled  None         None Found                                                            Last  Test          Frequency    Reason                     Performed    Due Date  --------------------------------------------------------------------------------    HBA1C.......  6 months...  metFORMIN................  04-   10-    [facility] Embedded Care Due Messages. Reference number: [id number].   1/09/2025 5:31:56 AM CST

## 2025-02-10 ENCOUNTER — TELEPHONE (OUTPATIENT)
Dept: ALLERGY | Facility: CLINIC | Age: 65
End: 2025-02-10
Payer: COMMERCIAL

## 2025-02-10 NOTE — TELEPHONE ENCOUNTER
----- Message from Med Assistant Ulrich sent at 2/10/2025  9:07 AM CST -----  Contact: Jacquelyn FERNANDEZ/ [facility]  Type:  Pharmacy Calling to Clarify an RX    Name of Caller:Jacquelyn  Pharmacy Name:[facility]  Prescription Name: Immunoglobulin therapy  What do they need to clarify?:The code and prescription don't match what has been previously certified  Best Call Back Number: [phone]  Additional Information: Ask for Nurse. Reference Number is [id number]

## 2025-02-10 NOTE — TELEPHONE ENCOUNTER
Spoke with Jacquelyn at [facility]. I gave her [facility] phone number to contact  about which brand of IVIG is available currently.

## 2025-02-19 DIAGNOSIS — Z78.0 MENOPAUSE: ICD-10-CM

## 2025-02-24 ENCOUNTER — HOSPITAL ENCOUNTER (OUTPATIENT)
Dept: CARDIOLOGY | Facility: HOSPITAL | Age: 65
Discharge: HOME | End: 2025-02-24
Attending: INTERNAL MEDICINE
Payer: COMMERCIAL

## 2025-02-24 ENCOUNTER — TELEPHONE (OUTPATIENT)
Dept: CARDIOLOGY | Facility: CLINIC | Age: 65
End: 2025-02-24
Payer: COMMERCIAL

## 2025-02-24 ENCOUNTER — OFFICE VISIT (OUTPATIENT)
Dept: CARDIOLOGY | Facility: CLINIC | Age: 65
End: 2025-02-24
Payer: COMMERCIAL

## 2025-02-24 VITALS
BODY MASS INDEX: 24.12 KG/M2 | WEIGHT: 144.81 LBS | SYSTOLIC BLOOD PRESSURE: 132 MMHG | DIASTOLIC BLOOD PRESSURE: 84 MMHG | HEIGHT: 65 IN | OXYGEN SATURATION: 97 % | HEART RATE: 74 BPM

## 2025-02-24 DIAGNOSIS — R23.3 EASY BRUISABILITY: ICD-10-CM

## 2025-02-24 DIAGNOSIS — I10 ESSENTIAL HYPERTENSION: ICD-10-CM

## 2025-02-24 DIAGNOSIS — J44.9 CHRONIC OBSTRUCTIVE PULMONARY DISEASE, UNSPECIFIED COPD TYPE: ICD-10-CM

## 2025-02-24 DIAGNOSIS — Z53.20 REFUSAL OF STATIN MEDICATION BY PATIENT: ICD-10-CM

## 2025-02-24 DIAGNOSIS — Z00.00 ROUTINE HEALTH MAINTENANCE: Primary | ICD-10-CM

## 2025-02-24 DIAGNOSIS — Z00.00 ROUTINE HEALTH MAINTENANCE: ICD-10-CM

## 2025-02-24 DIAGNOSIS — I25.10 CORONARY ARTERY CALCIFICATION SEEN ON CAT SCAN: Primary | ICD-10-CM

## 2025-02-24 DIAGNOSIS — I25.10 CORONARY ARTERY DISEASE INVOLVING NATIVE CORONARY ARTERY OF NATIVE HEART WITHOUT ANGINA PECTORIS: ICD-10-CM

## 2025-02-24 DIAGNOSIS — Z87.891 EX-CIGARETTE SMOKER: ICD-10-CM

## 2025-02-24 LAB
OHS QRS DURATION: 92 MS
OHS QTC CALCULATION: 437 MS

## 2025-02-24 PROCEDURE — 99999 PR PBB SHADOW E&M-EST. PATIENT-LVL III: CPT | Mod: PBBFAC,,, | Performed by: INTERNAL MEDICINE

## 2025-02-24 PROCEDURE — 99214 OFFICE O/P EST MOD 30 MIN: CPT | Mod: S$GLB,,, | Performed by: INTERNAL MEDICINE

## 2025-02-24 PROCEDURE — 93005 ELECTROCARDIOGRAM TRACING: CPT

## 2025-02-24 PROCEDURE — 93010 ELECTROCARDIOGRAM REPORT: CPT | Mod: ,,, | Performed by: INTERNAL MEDICINE

## 2025-02-24 RX ORDER — HYDROCHLOROTHIAZIDE 12.5 MG/1
12.5 TABLET ORAL DAILY
Qty: 90 TABLET | Refills: 3 | Status: SHIPPED | OUTPATIENT
Start: 2025-02-24

## 2025-02-24 RX ORDER — AMLODIPINE BESYLATE 5 MG/1
5 TABLET ORAL DAILY
Qty: 90 TABLET | Refills: 3 | Status: SHIPPED | OUTPATIENT
Start: 2025-02-24

## 2025-02-24 RX ORDER — EZETIMIBE 10 MG/1
10 TABLET ORAL DAILY
Qty: 90 TABLET | Refills: 3 | Status: SHIPPED | OUTPATIENT
Start: 2025-02-24

## 2025-02-24 NOTE — PROGRESS NOTES
Subjective:   Patient ID:  María Julian is a 65 y.o. female who presents for evaluation of No chief complaint on file.        Dizziness: no syncope, no palpitations and no chest pain.   2.24.2025  Comes in for follow-up.    Carotid duplex mild atherosclerosis.    Taking aspirin daily states this is causing her some bruising.  Discuss changing it to Monday Wednesday Friday.    Otherwise no angina or significant dyspnea on exertion.  No report of syncope or presyncope   Following with pain management for back pain.    History of back surgery.      8.12.2024  Normal stress echo after last visit, 6 mins on treadmill, 100% thr   No chest pain, + eli , copd though   Repeat bp better after arrival , reports white coat  Compliant with medication     1.11.2024  Had pneumonia after her last visit and couldn't schedule her stress echo   Comes in for six-month follow-up.  Doing well.  States blood pressure sometimes be running mostly higher 130s to 140 at home.  Compliant with medications.  Known to have coronary after sclerosis based on CT scan.  LDL not at goal.  Declines statins.      6.5.2023  Comes in for six-month follow-up.    Denies any chest pain.    No palpitations syncope presyncope   Her lipid panel is elevated.    Her CT scan of the chest showed coronary artery calcification.  However no chest pain  Her LDL is not at goal however she refused to take any statin or Zetia.    Her blood pressure is elevated today however she states at home it is 120/70 however she does not check it often.    She states that she took Sudafed also goes she had a recent upper respiratory infection.        12/05/2022.    Comes in for six-month follow-up.    She states that the Toprol that she was prescribed for palpitations did help with the palpitations however she was feeling very fatigued so she stopped it.    She is still on hydrochlorothiazide.    Her blood pressure runs at home between 120 and 150.    Her blood pressure  today on my last check was 160/95.        6.2.  Comes in for follow up , overall she is doing better since last visit, since she states her back pain feels better after RFA  She states she has been having higher BP at home and on visit   Her echo didn't show low EF or cardiomyopathy, done since hx of hemochromatosis   Started on hctz last visit with better numbers overall except for some higher   No chest pain, no dyspnea, no palpitations  Also under some work stress       .  62 yo female, ex smoker quit in 2018, 45 pqy, elevated BP without hx of HTN, BP ranges high normal to abnormal   She works in an office. She uses the stairs every day, 1 flight multiple times a day, without chest pain, dyspnea, syncope, presyncope, dizziness, headaches.   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  Pt denies any complications with general anesthesia    Recurrent upper respiratory infection (URI)        Past Surgical History:   Procedure Laterality Date    BONE SPUR EXCISION SHOULDER Right     CARPAL TUNNEL RELEASE Left      SECTION      x 2    CHOLECYSTECTOMY  2018    DILATION AND CURETTAGE OF UTERUS      EXAMINATION UNDER ANESTHESIA N/A 2021    Procedure: Exam Under Anesthesia;  Surgeon: Don Allen MD;  Location: [facility];  Service: Oncology;  Laterality: N/A;    FOOT TENDON SURGERY Bilateral     HYSTERECTOMY      fibroid    INJECTION OF ANESTHETIC AGENT AROUND MEDIAL BRANCH NERVES INNERVATING LUMBAR FACET JOINT Right 10/28/2021    Procedure:  Block-nerve-medial branch-lumbar Right L3, 4,5 MBB with RN IV sedation;  Surgeon: Lei Copeland MD;  Location: [facility] PAIN MGT;  Service: Pain Management;  Laterality: Right;    INJECTION OF ANESTHETIC AGENT AROUND MEDIAL BRANCH NERVES INNERVATING LUMBAR FACET JOINT Bilateral 2021    Procedure: Block-nerve-medial branch-lumbar bilateral L3, 4,5 MBB RN IV sedation;  Surgeon: Lei Copeland MD;  Location: [facility] PAIN MGT;  Service: Pain Management;  Laterality: Bilateral;    INJECTION OF ANESTHETIC AGENT AROUND MEDIAL BRANCH NERVES INNERVATING LUMBAR FACET JOINT Bilateral 3/14/2022    Procedure: Block-nerve-medial branch-lumbar L3, 4 5 NO STEROIDS RN IV sedation;  Surgeon: Lei Copeland MD;  Location: [facility] PAIN MGT;  Service: Pain Management;  Laterality: Bilateral;    INJECTION OF ANESTHETIC AGENT AROUND MEDIAL BRANCH NERVES INNERVATING LUMBAR FACET JOINT Bilateral 3/21/2022    Procedure: Block-nerve-medial branch-lumbar bilateral L3, 4,5 RN IV sedation NO STERIOD DIAGNOSTIC ONLY;  Surgeon: Lei Copeland MD;  Location: [facility] PAIN MGT;  Service: Pain Management;  Laterality: Bilateral;    LASER ABLATION N/A 2021    Procedure: ABLATION, USING LASER;  Surgeon: Don Allen MD;  Location: [facility] OR;  Service: Oncology;  Laterality: N/A;    MICRODISCECTOMY OF SPINE      OOPHORECTOMY Bilateral     RADIOFREQUENCY THERMOCOAGULATION Bilateral 2022    Procedure: RADIOFREQUENCY THERMAL COAGULATION bilateral L3, 4,5 mbb RFA RN IV sedation;  Surgeon: Lei Copeland MD;  Location: [facility] PAIN MGT;  Service: Pain Management;  Laterality: Bilateral;    TONSILLECTOMY      TRIGGER FINGER RELEASE Left     TUBAL LIGATION Bilateral        Social History     Tobacco Use    Smoking status: Former     Current packs/day: 0.00     Average packs/day: 1 pack/day for 43.7 years (43.7 ttl pk-yrs)     Types: Cigarettes     Start date: 1975     Quit date: 2018     Years since quittin.4    Smokeless tobacco: Never   Substance Use  Topics    Alcohol use: Yes     Alcohol/week: 7.0 standard drinks of alcohol     Types: 7 Drinks containing 0.5 oz of alcohol per week     Comment: socially  No alcohol 72h prior to sx    Drug use: Never       Family History   Problem Relation Name Age of Onset    Malignant hypertension Mother Elis Alexander     Heart disease Mother Elis Alexander     Cancer Father Raymond Alexander         Liver cancer    Diabetes Father Raymond Alexander     Ovarian cancer Sister Sharda Alexander     Alcohol abuse Sister Sharda Alexander     Early death Sister Sharda Alexander        Review of Systems   Cardiovascular:  Negative for chest pain, dyspnea on exertion, palpitations and syncope.   Genitourinary: Negative.    Neurological:  Positive for dizziness.       Current Outpatient Medications on File Prior to Visit   Medication Sig    amLODIPine (NORVASC) 5 MG tablet TAKE 1 TABLET(5 MG) BY MOUTH EVERY DAY    aspirin 81 MG Chew Take 81 mg by mouth once daily.    diphenhydrAMINE (BENADRYL) 25 mg capsule Take 25 mg by mouth. Before infusions    ezetimibe (ZETIA) 10 mg tablet TAKE 1 TABLET(10 MG) BY MOUTH EVERY DAY    gabapentin (NEURONTIN) 300 MG capsule Take 300 mg by mouth every evening.     hydroCHLOROthiazide (HYDRODIURIL) 12.5 MG Tab TAKE 1 TABLET BY MOUTH EVERY DAY    Immune Globulin G, IGG,-PRO-IGA 10 % injection, Privigen, (PRIVIGEN) 10 % Soln Inject 300 mLs (30 g total) into the vein every 28 days.    metFORMIN (GLUCOPHAGE) 500 MG tablet TAKE 1 TABLET(500 MG) BY MOUTH DAILY WITH BREAKFAST    predniSONE (DELTASONE) 10 MG tablet TAKE 1 TABLET BY MOUTH EVERY DAY AS NEEDED    TRELEGY ELLIPTA 200-62.5-25 mcg inhaler INHALE 1 PUFF INTO THE LUNGS EVERY DAY    albuterol (PROVENTIL/VENTOLIN HFA) 90 mcg/actuation inhaler INHALE 2 PUFFS INTO THE LUNGS EVERY 6 HOURS AS NEEDED. RESCUE (Patient not taking: Reported on 2/24/2025)    albuterol-ipratropium (DUO-NEB) 2.5 mg-0.5 mg/3 mL nebulizer solution USE 1 VIAL VIA NEBULIZER EVERY 6 HOURS AS NEEDED FOR WHEEZING OR  SHORTNESS OF BREATH OR RESCUE (Patient not taking: Reported on 12/2/2024)    budesonide (PULMICORT) 0.5 mg/2 mL nebulizer solution USE 2 ML(0.5 MG) VIA NEBULIZER TWICE DAILY (Patient not taking: Reported on 12/2/2024)    nebulizer and compressor Beatrice Use to deliver nebulizer medications up to 6 times a day (Patient not taking: Reported on 11/14/2024)     No current facility-administered medications on file prior to visit.       Objective:   Objective:  Wt Readings from Last 3 Encounters:   02/24/25 65.7 kg (144 lb 13.5 oz)   12/02/24 65.2 kg (143 lb 11.8 oz)   11/14/24 65.2 kg (143 lb 11.8 oz)     Temp Readings from Last 3 Encounters:   11/14/24 98.8 °F (37.1 °C)   05/08/24 98.8 °F (37.1 °C)   01/25/24 98.1 °F (36.7 °C)     BP Readings from Last 3 Encounters:   02/24/25 132/84   12/02/24 134/62   11/14/24 136/82     Pulse Readings from Last 3 Encounters:   02/24/25 74   11/14/24 72   08/12/24 84       Physical Exam  Vitals reviewed.   Constitutional:       Appearance: She is well-developed.   Neck:      Vascular: No carotid bruit.   Cardiovascular:      Rate and Rhythm: Normal rate and regular rhythm.      Pulses: Intact distal pulses.      Heart sounds: Normal heart sounds. No murmur heard.  Pulmonary:      Breath sounds: Normal breath sounds.   Neurological:      Mental Status: She is oriented to person, place, and time.         Lab Results   Component Value Date    CHOL 163 04/25/2024    CHOL 160 04/24/2023    CHOL 197 04/14/2022     Lab Results   Component Value Date    HDL 66 04/25/2024    HDL 52 04/24/2023    HDL 64 04/14/2022     Lab Results   Component Value Date    LDLCALC 84.0 04/25/2024    LDLCALC 93.8 04/24/2023    LDLCALC 115.6 04/14/2022     Lab Results   Component Value Date    TRIG 65 04/25/2024    TRIG 71 04/24/2023    TRIG 87 04/14/2022     Lab Results   Component Value Date    CHOLHDL 40.5 04/25/2024    CHOLHDL 32.5 04/24/2023    CHOLHDL 32.5 04/14/2022       Chemistry        Component Value  "Date/Time     04/25/2024 0952    K 3.7 04/25/2024 0952     04/25/2024 0952    CO2 22 (L) 04/25/2024 0952    BUN 19 04/25/2024 0952    CREATININE 1.0 04/25/2024 0952    GLU 88 04/25/2024 0952        Component Value Date/Time    CALCIUM 9.7 04/25/2024 0952    ALKPHOS 68 04/25/2024 0952    AST 18 04/25/2024 0952    ALT 9 (L) 04/25/2024 0952    BILITOT 0.8 04/25/2024 0952    ESTGFRAFRICA >60.0 04/14/2022 0833    EGFRNONAA >60.0 04/14/2022 0833          Lab Results   Component Value Date    TSH 1.368 04/25/2024     No results found for: "INR", "PROTIME"  Lab Results   Component Value Date    WBC 5.58 04/25/2024    HGB 16.1 (H) 04/25/2024    HCT 45.6 04/25/2024     (H) 04/25/2024     04/25/2024     BNP  @LABRCNTIP(BNP,BNPTRIAGEBLO)@  CrCl cannot be calculated (Patient's most recent lab result is older than the maximum 7 days allowed.).     Imaging:  ======  No results found for this or any previous visit.    No results found for this or any previous visit.    No results found for this or any previous visit.    No results found for this or any previous visit.  Results for orders placed during the hospital encounter of 06/15/21    Echo    Interpretation Summary  · The left ventricle is normal in size with normal systolic function.  · The estimated ejection fraction is 65%.  · Normal left ventricular diastolic function.  · Normal right ventricular size with normal right ventricular systolic function.  · The ascending aorta is mildly dilated.  · The sinuses of Valsalva is mildly dilated.  · Mild mitral regurgitation.  · Mild aortic regurgitation.      Diagnostic Results:  ECG: Reviewed, no dynamic changes , no evidence of prior MI   Sinus arrhyhtmia, left axis deviation     8.2024    Interpretation Summary  Show Result Comparison     There is 20-39% right Internal Carotid Stenosis.    There is 20-39% left Internal Carotid Stenosis.       The 10-year ASCVD risk score (Vinh DK, et al., 2019) is: " 6.6%    Values used to calculate the score:      Age: 65 years      Sex: Female      Is Non- : No      Diabetic: No      Tobacco smoker: No      Systolic Blood Pressure: 132 mmHg      Is BP treated: Yes      HDL Cholesterol: 66 mg/dL      Total Cholesterol: 163 mg/dL    Assessment and Plan:   Coronary artery calcification seen on CAT scan    Coronary artery disease involving native coronary artery of native heart without angina pectoris    Chronic obstructive pulmonary disease, unspecified COPD type    Ex-cigarette smoker    Essential hypertension    Refusal of statin medication by patient    Easy bruisability               Continue HCTZ Norvasc.  Tolerating Zetia, LDL better  Did not tolerate Toprol the past.    Continue to exercise.    Low-salt diet.     Blood pressure not at goal however she states this is due to her taking Sudafed today.    Normal stress echo   Normal bp at home   Carotid US reviewed 20-40% bilateral.  August 2024  On aspirin , change to mwf given bruising  Follow up in 12 months

## 2025-02-24 NOTE — TELEPHONE ENCOUNTER
Pt stated that she should be here in 20 minutes         ----- Message from Geoffrey sent at 2/24/2025 10:57 AM CST -----  .Type: Patient Call Back  Who called: patient  What is the request in detail:  Called in concerning appt . [place] . Please call back Can the clinic reply by [facility]?     Would the patient rather a call back or a response via [facility]? call  Best call back number:.[phone]

## 2025-02-26 ENCOUNTER — TELEPHONE (OUTPATIENT)
Dept: ALLERGY | Facility: CLINIC | Age: 65
End: 2025-02-26
Payer: COMMERCIAL

## 2025-02-26 NOTE — TELEPHONE ENCOUNTER
----- Message from Attentio sent at 2/26/2025  9:55 AM CST -----  Contact: María  Type:  Sooner Apoointment RequestCaller is requesting a sooner appointment.  Caller declined first available appointment listed below.  Caller will not accept being placed on the waitlist and is requesting a message be sent to doctor.Name of Caller:Juanpablo is the first available appointment? noneSymptoms:Would the patient rather a call back or a response via [facility]? Call [facility] Call Back Number:[phone] Information: María need to reschedule her appointment and may be able to come in later the day she is scheduled.

## 2025-02-26 NOTE — PROGRESS NOTES
Subjective:       Patient ID: María Julian is a 65 y.o. female.    Chief Complaint:    FOLLOW UP ON SPECIFIC ANTIBODY DEFICIENCY- WITH NORMAL IMMUNOGLOBULINS- SAD- NI,  / CVID, RECURRENT SINO PULMONARY INFECTIONS, HEMOCHROMATOSIS, MALISE AND FATIGUE, ASTHMA- COPD, RECURRENT COLONIC POLYS      HPI:     female 65- year - old with the above complaints. For follow up. USED TO GET RECURRENT SINO- BRONCHITIS-- NEEDING MULTIPLE COURSES OF ANTIBIOTICS, REQUIRING ED VISITS AND HOSPITALIZATIONS TWICE A YEAR BEFORE she was started on   Monthly IVIG infusions about 15 plus yeras ago. IVIG has improved the quality of her life and has helped her very much,  Trough IgG will be monitored q 4 months. María will be monitored for long and short term sequelae of CVID / SAD- NI.    She has perennial allergic rhinitis by testing by SPTs - over 15 years ago. I did not start her on allergen immunotherapy. She is on symptomatic treatment. María is well versed with environmental control measures and asthma triggers.  She is a former cigarette smoker and I believe has asthma - COPD. She loves Trelegy 200 / 62.5/ 25-- daily and a TAYA- PRN.  NO recent spirogram or FeNO. .  In the past Levaquin caused tendonitis.    She must follow up on hemochromatosis.  She has had chronic malaise and fatigue.      She is concerned about having to do yearly colonoscopy for recurrent colonic polyps.                   Erythromycin, Macrolide antibiotics, Iodine and iodide containing products, and Levofloxacin -- allergies were reported    Past Medical History:   Diagnosis Date    Abnormal Pap smear of cervix     Allergy     Asthma     COPD (chronic obstructive pulmonary disease)     Malignant hyperthermia     pt's mother has hx of MH.  Pt denies any complications with general anesthesia    Recurrent upper respiratory infection (URI)        Family History   Problem Relation Name Age of Onset    Malignant hypertension Mother Elis  Black     Heart disease Mother Elis Black     Cancer Father Raymond Alexander         Liver cancer    Diabetes Father Raymond Alexander     Ovarian cancer Sister Sharda Alexander     Alcohol abuse Sister Sharda Black     Early death Sister Sharda Alexander        Environmental History: Dust Mite Controls: Dust mite controls are already in place.     Review of Systems   Constitutional: Negative.    HENT:  Positive for congestion, postnasal drip and rhinorrhea.    Eyes: Negative.    Respiratory:  Positive for cough.    Cardiovascular: Negative.    Gastrointestinal: Negative.    Endocrine: Negative.    Genitourinary: Negative.    Musculoskeletal: Negative.    Skin: Negative.    Allergic/Immunologic: Positive for environmental allergies and immunocompromised state.   Neurological: Negative.    Hematological: Negative.    Psychiatric/Behavioral: Negative.       Objective:     There were no vitals taken for this visit.    Physical Exam  Vitals and nursing note reviewed.   Constitutional:       Appearance: Normal appearance. She is normal weight.   HENT:      Head: Normocephalic and atraumatic.      Right Ear: Tympanic membrane, ear canal and external ear normal.      Left Ear: Tympanic membrane, ear canal and external ear normal.      Nose: Congestion and rhinorrhea present.      Mouth/Throat:      Mouth: Mucous membranes are moist.      Pharynx: Oropharynx is clear.   Cardiovascular:      Rate and Rhythm: Normal rate and regular rhythm.      Pulses: Normal pulses.      Heart sounds: Normal heart sounds.   Pulmonary:      Effort: Pulmonary effort is normal.      Breath sounds: Normal breath sounds.   Abdominal:      General: Abdomen is flat. Bowel sounds are normal.      Palpations: Abdomen is soft.   Musculoskeletal:         General: Normal range of motion.      Cervical back: Normal range of motion and neck supple.   Skin:     General: Skin is warm and dry.      Capillary Refill: Capillary refill takes less than 2 seconds.   Neurological:       General: No focal deficit present.      Mental Status: She is alert and oriented to person, place, and time. Mental status is at baseline.   Psychiatric:         Mood and Affect: Mood normal.         Behavior: Behavior normal.         Thought Content: Thought content normal.         Judgment: Judgment normal.       Assessment:      1. CVID (common variable immunodeficiency)    2. 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 Recurrent sinusitis    4. Bronchitis    5. Asthma-COPD overlap syndrome    6. Malaise and fatigue    7. Ex-cigarette smoker    8. Hemochromatosis, unspecified hemochromatosis type    9      Levaquin allergy.- tendonitis.  10    Recurrent Colonic Polyps- undergoes yearly colonoscopy by William stephens MD    Plan:       Monthly IVIG- Privigen 10 % - 30 grams- 300 ml- to be infused over 4 hours.  Monitor for short and long term sequelae of CVID/ SAD- NA.  Monitor trough IgG Q 4 MONTHS.  -------------------------------------------------------------------  Treat all infections- A 10- days - course of Azithromycin plus Prednisone 20 mg qd for 3- 5 days.\----------------------  -----------------------------------------------------------------------------------------------  Trelegy Ellipta 200 / 62.5 / 25-- one puff daily.  Proair HFA 90 mcg- 2 puffs tid prn  Reviewed inhaler technique.  Nebulized Budesonide 0.50 mg plus Duoneb one unit tid prn..  ---------------------------------------------------------------------------------------  Up  to date on adult immunizations.\  Had AREXVY.  Annual influenza vaccinations.  --------------------------------------------------    Follow up in 3- 4 months  Follow up with William Stephens MD -- GI -- colonic polyps and hemochromatosis-and David Ochoa MD, PCP                  Problems Address                                                 Amount and/or Complexity                                                                       Risk       3           [] 2 or more self-limited or minor problems                      [] Limited                                                                        [] Low                  [] 1 stable chronic illness                                                  Any combination of the two                                               OTC drugs                  []Acute, uncomplicated illness or injury                            Review of prior external notes from unique source           Minor surgery with no risk factors                                                                                                               [] 1 []2  []3+                                                                                                              Review of results from each unique test                                                                                                               [] 1 []2  [] 3+                                                                                                              Order of each unique test                                                                                                               [] 1 []2  [] 3+                                                                                                              Or                                                                                                             [] Assessment requiring an independent historian      4            [] One or more chronic illness with exacerbation,              [] Moderate                                                                      [] Moderate                 Progression, or side effects of treatment                            -test documents or independent historians                        Prescription drug management                []  2 or more sable chronic illnesses                                    [] Independent interpretation of  tests                              Minor surgery with identifiable risk                [] 1 undiagnosed new problem with uncertain prognosis    [] Discussion or management of test results                    elective major surgery                [] 1 acute illness with                systemic symptoms                                                                                                                                                              [] 1 acute complicated injury                                                                                                                                          Elective major surgery                                                                                                                                                                                                                                                                                                                                                                                                  5            [x] 1 or more chronic illnesses with severe exacerbation,     [x] Extensive(two from below)                                         [x] High                                                                                                               [] Independent interpretation of results                         Drug therapy requiring intensive                                                                                                               []Discussion of management or test interpretation           monitoring                                                                                                                                                                                                       Decision to de-escalate care                 [] 1 acute or chronic illness or injury that poses a threat                                                                                                Decision regarding hospitalization

## 2025-02-27 ENCOUNTER — OFFICE VISIT (OUTPATIENT)
Dept: ALLERGY | Facility: CLINIC | Age: 65
End: 2025-02-27
Payer: COMMERCIAL

## 2025-02-27 VITALS
WEIGHT: 146.63 LBS | BODY MASS INDEX: 24.43 KG/M2 | SYSTOLIC BLOOD PRESSURE: 113 MMHG | HEIGHT: 65 IN | DIASTOLIC BLOOD PRESSURE: 73 MMHG | HEART RATE: 74 BPM | TEMPERATURE: 98 F

## 2025-02-27 DIAGNOSIS — D83.9 CVID (COMMON VARIABLE IMMUNODEFICIENCY): Primary | ICD-10-CM

## 2025-02-27 DIAGNOSIS — R53.81 MALAISE AND FATIGUE: ICD-10-CM

## 2025-02-27 DIAGNOSIS — E83.119 HEMOCHROMATOSIS, UNSPECIFIED HEMOCHROMATOSIS TYPE: ICD-10-CM

## 2025-02-27 DIAGNOSIS — R53.83 MALAISE AND FATIGUE: ICD-10-CM

## 2025-02-27 DIAGNOSIS — D80.6 SPECIFIC ANTIBODY DEFICIENCY WITH NORMAL IG CONCENTRATION AND NORMAL NUMBER OF B CELLS: ICD-10-CM

## 2025-02-27 DIAGNOSIS — J32.9 RECURRENT SINUSITIS: ICD-10-CM

## 2025-02-27 DIAGNOSIS — J44.89 ASTHMA-COPD OVERLAP SYNDROME: ICD-10-CM

## 2025-02-27 DIAGNOSIS — Z87.891 EX-CIGARETTE SMOKER: ICD-10-CM

## 2025-02-27 DIAGNOSIS — J40 BRONCHITIS: ICD-10-CM

## 2025-02-27 PROCEDURE — 99999 PR PBB SHADOW E&M-EST. PATIENT-LVL IV: CPT | Mod: PBBFAC,,, | Performed by: SPECIALIST

## 2025-02-27 PROCEDURE — 99215 OFFICE O/P EST HI 40 MIN: CPT | Mod: S$GLB,,, | Performed by: SPECIALIST

## 2025-03-03 NOTE — PROGRESS NOTES
New Patient Interventional Pain Note (Initial Visit)    Referring Physician: Jonatan Izaguirre    PCP: David Ochoa MD    Chief Complaint:   Chief Complaint   Patient presents with    Low-back Pain        SUBJECTIVE:    María Julian is a 65 y.o. female with past medical history significant for recurrent sinusitis, COPD/bronchitis, coronary artery disease, common variable immunodeficiency, prediabetes, hemochromatosis who presents to the clinic for the evaluation of lower back, leg and neck pain.  Of note patient reports history of prior microdiskectomy at [facility] in [place] which temporarily gave her improvement in lower back and leg pain.  Since then she has had lumbar L3-5 radiofrequency ablation [facility] and Dr. Copeland, internally.  She reports at least 2 years of greater than 80% relief following her most recent nerve burn.  Today she reports pain which is constant which is rated a 6/10.  Pain is described as stabbing, throbbing in nature as well as numbness and tingling in the right foot.  She reports pain in a bandlike distribution in the lower back.  Pain can also radiate down the lateral aspects of bilateral lower extremities in L4-S1 distribution to the feet.  Majority, 90% of pain remains axial in the lower back.  Pain can be exacerbated with prolonged sitting or standing exceeding 10-15 minutes in duration.  She does report weakness in the lower extremities as well as issues with balance.  Pain is improved with stretching.  She has performed physician directed physical therapy exercises for lower back and leg pain over the last 8 weeks daily from January 4, 2025 through March 4, 2025 with marginal improvement in her symptoms.  Patient is interested in repeating lumbar radiofrequency ablation.    She also reports bilateral cervical paraspinous pain, worse on the left side.  She reports grinding in the left neck.  Pain can be exacerbated with  "cervical lateral rotation and flexion at/extension.  She denies more distal radiculopathy into the upper extremities or hands, weakness in the upper extremities or myelopathic signs such as compromise in hand  strength or dexterity.    Patient has not had recent lumbar MRI.  She does report colonoscopy and which "clips" were placed.  Patient has a identification card with her today stating that these are MRI conditional.    Patient reports significant motor weakness and loss of sensations.  Patient denies night fever/night sweats, urinary incontinence, bowel incontinence, and significant weight loss.      Pain Disability Index Review:         3/4/2025    12:21 PM 2021     2:51 PM   Last 3 PDI Scores   Pain Disability Index (PDI) 40 25       Non-Pharmacologic Treatments:  Physical Therapy/Home Exercise: yes  Ice/Heat:no  TENS: no  Acupuncture: no  Massage: no  Chiropractic: no    Other: yes; [facility]diskectomy -[facility]      Pain Medications:  - Adjuvant Medications: Neurontin (Gabapentin) and Prednisone (Deltasone)  - Anti-Coagulants: Aspirin    Pain Procedures:   Dr. Copeland:  -2022: Bilateral L3-5 lumbar radiofrequency ablation  -2022: Bilateral L3-5 diagnostic lumbar medial branch block  -2022:  Bilateral L3-5 diagnostic lumbar medial branch block  -2021: Bilateral L3-5 lumbar medial branch block  -10/28/2021:  Right-sided L3-5 lumbar medial branch block    Dr. iWlliamson    Past Medical History:   Diagnosis Date    Abnormal Pap smear of cervix     Allergy     Asthma     COPD (chronic obstructive pulmonary disease)     Malignant hyperthermia     pt's mother has hx of MH.  Pt denies any complications with general anesthesia    Recurrent upper respiratory infection (URI)      Past Surgical History:   Procedure Laterality Date    BONE SPUR EXCISION SHOULDER Right     CARPAL TUNNEL RELEASE Left      SECTION      x 2    CHOLECYSTECTOMY  2018    DILATION AND CURETTAGE OF " UTERUS      EXAMINATION UNDER ANESTHESIA N/A 6/16/2021    Procedure: Exam Under Anesthesia;  Surgeon: Don Allen MD;  Location: [facility] OR;  Service: Oncology;  Laterality: N/A;    FOOT TENDON SURGERY Bilateral     HYSTERECTOMY  1999    fibroid    INJECTION OF ANESTHETIC AGENT AROUND MEDIAL BRANCH NERVES INNERVATING LUMBAR FACET JOINT Right 10/28/2021    Procedure: Block-nerve-medial branch-lumbar Right L3, 4,5 MBB with RN IV sedation;  Surgeon: Lei Copeland MD;  Location: [facility]GT;  Service: Pain Management;  Laterality: Right;    INJECTION OF ANESTHETIC AGENT AROUND MEDIAL BRANCH NERVES INNERVATING LUMBAR FACET JOINT Bilateral 12/30/2021    Procedure: Block-nerve-medial branch-lumbar bilateral L3, 4,5 MBB RN IV sedation;  Surgeon: Lei Copeland MD;  Location: [facility];  Service: Pain Management;  Laterality: Bilateral;    INJECTION OF ANESTHETIC AGENT AROUND MEDIAL BRANCH NERVES INNERVATING LUMBAR FACET JOINT Bilateral 3/14/2022    Procedure: Block-nerve-medial branch-lumbar L3, 4 5 NO STEROIDS RN IV sedation;  Surgeon: Lei Copeland MD;  Location: [facility];  Service: Pain Management;  Laterality: Bilateral;    INJECTION OF ANESTHETIC AGENT AROUND MEDIAL BRANCH NERVES INNERVATING LUMBAR FACET JOINT Bilateral 3/21/2022    Procedure: Block-nerve-medial branch-lumbar bilateral L3, 4,5 RN IV sedation NO STERIOD DIAGNOSTIC ONLY;  Surgeon: Lei Copeland MD;  Location: [facility];  Service: Pain Management;  Laterality: Bilateral;    LASER ABLATION N/A 6/16/2021    Procedure: ABLATION, USING LASER;  Surgeon: Don Allen MD;  Location: [facility] OR;  Service: Oncology;  Laterality: N/A;    MICRODISCECTOMY OF SPINE      OOPHORECTOMY Bilateral 1999    RADIOFREQUENCY THERMOCOAGULATION Bilateral 4/7/2022    Procedure: RADIOFREQUENCY THERMAL COAGULATION bilateral L3, 4,5 mbb RFA RN IV sedation;  Surgeon: Lei Copeland MD;  Location: [facility];  Service: Pain Management;  Laterality: Bilateral;    TONSILLECTOMY       TRIGGER FINGER RELEASE Left     TUBAL LIGATION Bilateral 1982     Review of patient's allergies indicates:   Allergen Reactions    Erythromycin Anaphylaxis    Macrolide antibiotics Anaphylaxis    Iodine and iodide containing products Hives    Levofloxacin      Pains in her tendons/joints       Current Outpatient Medications   Medication Sig    albuterol (PROVENTIL/VENTOLIN HFA) 90 mcg/actuation inhaler INHALE 2 PUFFS INTO THE LUNGS EVERY 6 HOURS AS NEEDED. RESCUE    amLODIPine (NORVASC) 5 MG tablet Take 1 tablet (5 mg total) by mouth once daily.    aspirin 81 MG Chew Take 81 mg by mouth once daily.    diphenhydrAMINE (BENADRYL) 25 mg capsule Take 25 mg by mouth. Before infusions    ezetimibe (ZETIA) 10 mg tablet Take 1 tablet (10 mg total) by mouth once daily.    gabapentin (NEURONTIN) 300 MG capsule Take 300 mg by mouth every evening.     hydroCHLOROthiazide 12.5 MG Tab Take 1 tablet (12.5 mg total) by mouth once daily.    Immune Globulin G, IGG,-PRO-IGA 10 % injection, Privigen, (PRIVIGEN) 10 % Soln Inject 300 mLs (30 g total) into the vein every 28 days.    metFORMIN (GLUCOPHAGE) 500 MG tablet TAKE 1 TABLET(500 MG) BY MOUTH DAILY WITH BREAKFAST    nebulizer and compressor Beatrice Use to deliver nebulizer medications up to 6 times a day    predniSONE (DELTASONE) 10 MG tablet TAKE 1 TABLET BY MOUTH EVERY DAY AS NEEDED    TRELEGY ELLIPTA 200-62.5-25 mcg inhaler INHALE 1 PUFF INTO THE LUNGS EVERY DAY     No current facility-administered medications for this visit.         ROS:  GENERAL:  No weight loss, malaise or fevers.  HEENT:   No recent changes in vision or hearing  NECK:  Negative for lumps, no difficulty with swallowing.  RESPIRATORY:  Negative for cough, wheezing or shortness of breath, patient denies any recent URI.  CARDIOVASCULAR:  Negative for chest pain or palpitations.  GI:  Negative for abdominal discomfort, blood in stools or black stools or change in bowel habits.  MUSCULOSKELETAL:  See HPI.  SKIN:   "Negative for lesions, rash, and itching.  PSYCH:  No mood disorder or recent psychosocial stressors.   HEMATOLOGY/LYMPHOLOGY:  Negative for prolonged bleeding, bruising easily or swollen nodes.    NEURO:   No history of syncope, paralysis, seizures or tremors.  All other reviewed and negative other than HPI.    OBJECTIVE:    /74   Pulse 77   Resp 17   Ht 5' 5" (1.651 m)   Wt 66 kg (145 lb 8.1 oz)   BMI 24.21 kg/m²       Physical Exam:    GENERAL: Well appearing, in no acute distress, alert and oriented x3.  PSYCH:  Mood and affect appropriate.  SKIN: Skin color, texture, turgor normal, no rashes or lesions.  HEAD/FACE:  Normocephalic, atraumatic. Cranial nerves grossly intact.    CV: RRR with palpation of the radial artery.  PULM: No evidence of respiratory difficulty, symmetric chest rise.  GI:  Soft and non-tender.    BACK: Straight leg raising in the sitting and supine positions is negative to radicular pain. pain to palpation over the facet joints of the lumbar spine or spinous processes. Normal range of motion without pain reproduction.  EXTREMITIES: Peripheral joint ROM is full and pain free without obvious instability or laxity in all four extremities. No deformities, edema, or skin discoloration. Good capillary refill.  MUSCULOSKELETAL: Able to stand on heels & toes.   Shoulder, hip, and knee provocative maneuvers are negative.  There is no pain with palpation over the sacroiliac joints bilaterally.  Gaenslen's, Distraction/Compression and  FABERs test is negative.  Facet loading test is positive bilaterally.   Bilateral upper and lower extremity strength is normal and symmetric.  No atrophy or tone abnormalities are noted.    RIGHT Lower extremity: Hip flexion 5/5, Hip Abduction 5/5, Hip Adduction 5/5, Knee extension 5/5, Knee flexion 5/5, Ankle dorsiflexion5/5, Extensor hallucis longus 5/5, Ankle plantarflexion 5/5  LEFT Lower extremity:  Hip flexion 5/5, Hip Abduction 5/5,Hip Adduction 5/5, " Knee extension 5/5, Knee flexion 5/5, Ankle dorsiflexion 5/5, Extensor hallucis longus 5/5, Ankle plantarflexion 5/5  -Normal testing knee (patellar) jerk and ankle (achilles) jerk    NEURO: Bilateral upper and lower extremity coordination and muscle stretch reflexes are physiologic and symmetric. No loss of sensation is noted.  GAIT: normal.    Imaging:   10/07/21    X-Ray Lumbar Complete Including Flex And Ext    Narrative  EXAMINATION:  XR LUMBAR SPINE 5 VIEW WITH FLEX AND EXT    CLINICAL HISTORY:  Back pain or radiculopathy, > 6 wks;  Dorsalgia, unspecified    TECHNIQUE:  Five views of the lumbar spine plus flexion extension views were performed.    COMPARISON:  None.    FINDINGS:  Status post cholecystectomy.  Aortic atherosclerosis.    Bones are demineralized.    No acute fracture.  No listhesis.  No instability with flexion/extension.    Multilevel marginal spurring.  Mild disc space narrowing at the lumbosacral junction.  Mild multilevel lower lumbar facet arthropathy.          ASSESSMENT: 65 y.o. year old female with     1. Degeneration of intervertebral disc of lumbar region with discogenic back pain and lower extremity pain        2. Lumbar spondylosis  Case Request-RAD/Other Procedure Area: Bilateral L3-4, L4-5 MBB #1 with local      3. Lumbar radiculopathy, chronic  MRI Lumbar Spine Without Contrast      4. Cervical spondylosis  X-Ray Cervical Spine 5 View W Flex Extxt            PLAN:   - Interventions:  Schedule for bilateral L3-5 diagnostic lumbar medial branch block to see if this helps with axial back pain.  We have discussed with significant (80%) relief x2, she may be a candidate for high heat radiofrequency ablation for more sustained relief.  Of note patient received 80% relief exceeding 2 years in duration with prior bilateral L3-5 lumbar RFA. Explained the risks and benefits of the procedure in detail with the patient today in clinic along with alternative treatment options, and the patient  elected to pursue the intervention at this time.      - Anticoagulation use: Yes aspirin  --secondary ppx: CAD  ---per KAY guidelines for secondary prophylaxis, patient can continue aspirin for lumbar medial branch block/potential RFA    -we have discussed considering bilateral lumbar transforaminal epidural steroid injection for lumbar radiculopathy.  We will 1st review updated lumbar MRI.     report:  Reviewed and consistent with medication use as prescribed.    - Medications:  -Continue Gabapentin per outside provider    - Therapy:   We discussed continuing at home physician directed physical therapy to help manage the patient/s painful condition. The patient was counseled that muscle strengthening will improve the long term prognosis in regards to pain and may also help increase range of motion and mobility.     - Imaging: Reviewed available imaging(lumbar x-ray) with patient and answered any questions they had regarding study.  Cervical flexion-extension x-ray and lumbar MRI to better evaluate pain (Gastric clip-MRI conditional card scanned into media)    - Records: Obtain old records from outside physicians and imaging  -[facility]      - Follow up visit:  24 hour call following 1st diagnostic lumbar medial branch block    The above plan and management options were discussed at length with patient. Patient is in agreement with the above and verbalized understanding.    - I discussed the goals of interventional chronic pain management with the patient on today's visit. We discussed a multimodal and systematic approach to pain.  This includes diagnostic and therapeutic injections, adjuvant pharmacologic treatment, physical therapy, and at times psychiatry.  I emphasized the importance of regular exercise, core strengthening and stretching, diet and weight loss as a cornerstone of long-term pain management.    - This condition does not require this patient to take time off of work, and the  primary goal of our Pain Management services is to improve the patient's functional capacity.  - Patient Questions: Answered all of the patient's questions regarding diagnoses, therapy, treatment and next steps    Visit today included increased complexity associated with the care of the episodic problem of chronic pain which was addressed and continue to manage the longitudinal care of the patient due to the serious and/or complex managed problem(s) listed above.    - Direct patient care provided: 20 minutes+. Over 50% of the time was spent counseling/educating the patient. Total time spent on patient care including prep time reviewing the chart before the visit, time spent with patient during the visit, and the time spent after the visit reviewing studies, documenting note, obtaining information from collaborative providers, etc.: >40 minutes.       Jamir Carroll MD  Interventional Pain Management  [facility]    Disclaimer:  This note was prepared using voice recognition system and is likely to have sound alike errors that may have been overlooked even after proof reading.  Please call me with any questions

## 2025-03-03 NOTE — H&P (VIEW-ONLY)
New Patient Interventional Pain Note (Initial Visit)    Referring Physician: Jonatan Izaguirre    PCP: David Ochoa MD    Chief Complaint:   Chief Complaint   Patient presents with    Low-back Pain        SUBJECTIVE:    María Julian is a 65 y.o. female with past medical history significant for recurrent sinusitis, COPD/bronchitis, coronary artery disease, common variable immunodeficiency, prediabetes, hemochromatosis who presents to the clinic for the evaluation of lower back, leg and neck pain.  Of note patient reports history of prior microdiskectomy at [facility] in [place] which temporarily gave her improvement in lower back and leg pain.  Since then she has had lumbar L3-5 radiofrequency ablation [facility]. [facility] and Dr. Copeland, internally.  She reports at least 2 years of greater than 80% relief following her most recent nerve burn.  Today she reports pain which is constant which is rated a 6/10.  Pain is described as stabbing, throbbing in nature as well as numbness and tingling in the right foot.  She reports pain in a bandlike distribution in the lower back.  Pain can also radiate down the lateral aspects of bilateral lower extremities in L4-S1 distribution to the feet.  Majority, 90% of pain remains axial in the lower back.  Pain can be exacerbated with prolonged sitting or standing exceeding 10-15 minutes in duration.  She does report weakness in the lower extremities as well as issues with balance.  Pain is improved with stretching.  She has performed physician directed physical therapy exercises for lower back and leg pain over the last 8 weeks daily from January 4, 2025 through March 4, 2025 with marginal improvement in her symptoms.  Patient is interested in repeating lumbar radiofrequency ablation.    She also reports bilateral cervical paraspinous pain, worse on the left side.  She reports grinding in the left neck.  Pain can be exacerbated with  "cervical lateral rotation and flexion at/extension.  She denies more distal radiculopathy into the upper extremities or hands, weakness in the upper extremities or myelopathic signs such as compromise in hand  strength or dexterity.    Patient has not had recent lumbar MRI.  She does report colonoscopy and which "clips" were placed.  Patient has a identification card with her today stating that these are MRI conditional.    Patient reports significant motor weakness and loss of sensations.  Patient denies night fever/night sweats, urinary incontinence, bowel incontinence, and significant weight loss.      Pain Disability Index Review:         3/4/2025    12:21 PM 2021     2:51 PM   Last 3 PDI Scores   Pain Disability Index (PDI) 40 25       Non-Pharmacologic Treatments:  Physical Therapy/Home Exercise: yes  Ice/Heat:no  TENS: no  Acupuncture: no  Massage: no  Chiropractic: no    Other: yes; microdiskectomy -[facility]      Pain Medications:  - Adjuvant Medications: Neurontin (Gabapentin) and Prednisone (Deltasone)  - Anti-Coagulants: Aspirin    Pain Procedures:   Dr. Copeland:  -2022: Bilateral L3-5 lumbar radiofrequency ablation  -2022: Bilateral L3-5 diagnostic lumbar medial branch block  -2022:  Bilateral L3-5 diagnostic lumbar medial branch block  -2021: Bilateral L3-5 lumbar medial branch block  -10/28/2021:  Right-sided L3-5 lumbar medial branch block    Dr. Williamson    Past Medical History:   Diagnosis Date    Abnormal Pap smear of cervix     Allergy     Asthma     COPD (chronic obstructive pulmonary disease)     Malignant hyperthermia     pt's mother has hx of MH.  Pt denies any complications with general anesthesia    Recurrent upper respiratory infection (URI)      Past Surgical History:   Procedure Laterality Date    BONE SPUR EXCISION SHOULDER Right     CARPAL TUNNEL RELEASE Left      SECTION      x 2    CHOLECYSTECTOMY  2018    DILATION AND CURETTAGE OF " UTERUS      EXAMINATION UNDER ANESTHESIA N/A 6/16/2021    Procedure: Exam Under Anesthesia;  Surgeon: Don Allen MD;  Location: [facility] OR;  Service: Oncology;  Laterality: N/A;    FOOT TENDON SURGERY Bilateral     HYSTERECTOMY  1999    fibroid    INJECTION OF ANESTHETIC AGENT AROUND MEDIAL BRANCH NERVES INNERVATING LUMBAR FACET JOINT Right 10/28/2021    Procedure: Block-nerve-medial branch-lumbar Right L3, 4,5 MBB with RN IV sedation;  Surgeon: Lei Copeland MD;  Location: [facility] PAIN MGT;  Service: Pain Management;  Laterality: Right;    INJECTION OF ANESTHETIC AGENT AROUND MEDIAL BRANCH NERVES INNERVATING LUMBAR FACET JOINT Bilateral 12/30/2021    Procedure: Block-nerve-medial branch-lumbar bilateral L3, 4,5 MBB RN IV sedation;  Surgeon: Lei Copeland MD;  Location: [facility];  Service: Pain Management;  Laterality: Bilateral;    INJECTION OF ANESTHETIC AGENT AROUND MEDIAL BRANCH NERVES INNERVATING LUMBAR FACET JOINT Bilateral 3/14/2022    Procedure: Block-nerve-medial branch-lumbar L3, 4 5 NO STEROIDS RN IV sedation;  Surgeon: Lei Copeland MD;  Location: [facility];  Service: Pain Management;  Laterality: Bilateral;    INJECTION OF ANESTHETIC AGENT AROUND MEDIAL BRANCH NERVES INNERVATING LUMBAR FACET JOINT Bilateral 3/21/2022    Procedure: Block-nerve-medial branch-lumbar bilateral L3, 4,5 RN IV sedation NO STERIOD DIAGNOSTIC ONLY;  Surgeon: Lei Copeland MD;  Location: [facility];  Service: Pain Management;  Laterality: Bilateral;    LASER ABLATION N/A 6/16/2021    Procedure: ABLATION, USING LASER;  Surgeon: Don Allen MD;  Location: [facility] OR;  Service: Oncology;  Laterality: N/A;    MICRODISCECTOMY OF SPINE      OOPHORECTOMY Bilateral 1999    RADIOFREQUENCY THERMOCOAGULATION Bilateral 4/7/2022    Procedure: RADIOFREQUENCY THERMAL COAGULATION bilateral L3, 4,5 mbb RFA RN IV sedation;  Surgeon: Lei Copeland MD;  Location: [facility];  Service: Pain Management;  Laterality: Bilateral;    TONSILLECTOMY       TRIGGER FINGER RELEASE Left     TUBAL LIGATION Bilateral 1982     Review of patient's allergies indicates:   Allergen Reactions    Erythromycin Anaphylaxis    Macrolide antibiotics Anaphylaxis    Iodine and iodide containing products Hives    Levofloxacin      Pains in her tendons/joints       Current Outpatient Medications   Medication Sig    albuterol (PROVENTIL/VENTOLIN HFA) 90 mcg/actuation inhaler INHALE 2 PUFFS INTO THE LUNGS EVERY 6 HOURS AS NEEDED. RESCUE    amLODIPine (NORVASC) 5 MG tablet Take 1 tablet (5 mg total) by mouth once daily.    aspirin 81 MG Chew Take 81 mg by mouth once daily.    diphenhydrAMINE (BENADRYL) 25 mg capsule Take 25 mg by mouth. Before infusions    ezetimibe (ZETIA) 10 mg tablet Take 1 tablet (10 mg total) by mouth once daily.    gabapentin (NEURONTIN) 300 MG capsule Take 300 mg by mouth every evening.     hydroCHLOROthiazide 12.5 MG Tab Take 1 tablet (12.5 mg total) by mouth once daily.    Immune Globulin G, IGG,-PRO-IGA 10 % injection, Privigen, (PRIVIGEN) 10 % Soln Inject 300 mLs (30 g total) into the vein every 28 days.    metFORMIN (GLUCOPHAGE) 500 MG tablet TAKE 1 TABLET(500 MG) BY MOUTH DAILY WITH BREAKFAST    nebulizer and compressor Beatrice Use to deliver nebulizer medications up to 6 times a day    predniSONE (DELTASONE) 10 MG tablet TAKE 1 TABLET BY MOUTH EVERY DAY AS NEEDED    TRELEGY ELLIPTA 200-62.5-25 mcg inhaler INHALE 1 PUFF INTO THE LUNGS EVERY DAY     No current facility-administered medications for this visit.         ROS:  GENERAL:  No weight loss, malaise or fevers.  HEENT:   No recent changes in vision or hearing  NECK:  Negative for lumps, no difficulty with swallowing.  RESPIRATORY:  Negative for cough, wheezing or shortness of breath, patient denies any recent URI.  CARDIOVASCULAR:  Negative for chest pain or palpitations.  GI:  Negative for abdominal discomfort, blood in stools or black stools or change in bowel habits.  MUSCULOSKELETAL:  See HPI.  SKIN:   "Negative for lesions, rash, and itching.  PSYCH:  No mood disorder or recent psychosocial stressors.   HEMATOLOGY/LYMPHOLOGY:  Negative for prolonged bleeding, bruising easily or swollen nodes.    NEURO:   No history of syncope, paralysis, seizures or tremors.  All other reviewed and negative other than HPI.    OBJECTIVE:    /74   Pulse 77   Resp 17   Ht 5' 5" (1.651 m)   Wt 66 kg (145 lb 8.1 oz)   BMI 24.21 kg/m²       Physical Exam:    GENERAL: Well appearing, in no acute distress, alert and oriented x3.  PSYCH:  Mood and affect appropriate.  SKIN: Skin color, texture, turgor normal, no rashes or lesions.  HEAD/FACE:  Normocephalic, atraumatic. Cranial nerves grossly intact.    CV: RRR with palpation of the radial artery.  PULM: No evidence of respiratory difficulty, symmetric chest rise.  GI:  Soft and non-tender.    BACK: Straight leg raising in the sitting and supine positions is negative to radicular pain. pain to palpation over the facet joints of the lumbar spine or spinous processes. Normal range of motion without pain reproduction.  EXTREMITIES: Peripheral joint ROM is full and pain free without obvious instability or laxity in all four extremities. No deformities, edema, or skin discoloration. Good capillary refill.  MUSCULOSKELETAL: Able to stand on heels & toes.   Shoulder, hip, and knee provocative maneuvers are negative.  There is no pain with palpation over the sacroiliac joints bilaterally.  Gaenslen's, Distraction/Compression and  FABERs test is negative.  Facet loading test is positive bilaterally.   Bilateral upper and lower extremity strength is normal and symmetric.  No atrophy or tone abnormalities are noted.    RIGHT Lower extremity: Hip flexion 5/5, Hip Abduction 5/5, Hip Adduction 5/5, Knee extension 5/5, Knee flexion 5/5, Ankle dorsiflexion5/5, Extensor hallucis longus 5/5, Ankle plantarflexion 5/5  LEFT Lower extremity:  Hip flexion 5/5, Hip Abduction 5/5,Hip Adduction 5/5, " Knee extension 5/5, Knee flexion 5/5, Ankle dorsiflexion 5/5, Extensor hallucis longus 5/5, Ankle plantarflexion 5/5  -Normal testing knee (patellar) jerk and ankle (achilles) jerk    NEURO: Bilateral upper and lower extremity coordination and muscle stretch reflexes are physiologic and symmetric. No loss of sensation is noted.  GAIT: normal.    Imaging:   10/07/21    X-Ray Lumbar Complete Including Flex And Ext    Narrative  EXAMINATION:  XR LUMBAR SPINE 5 VIEW WITH FLEX AND EXT    CLINICAL HISTORY:  Back pain or radiculopathy, > 6 wks;  Dorsalgia, unspecified    TECHNIQUE:  Five views of the lumbar spine plus flexion extension views were performed.    COMPARISON:  None.    FINDINGS:  Status post cholecystectomy.  Aortic atherosclerosis.    Bones are demineralized.    No acute fracture.  No listhesis.  No instability with flexion/extension.    Multilevel marginal spurring.  Mild disc space narrowing at the lumbosacral junction.  Mild multilevel lower lumbar facet arthropathy.          ASSESSMENT: 65 y.o. year old female with     1. Degeneration of intervertebral disc of lumbar region with discogenic back pain and lower extremity pain        2. Lumbar spondylosis  Case Request-RAD/Other Procedure Area: Bilateral L3-4, L4-5 MBB #1 with local      3. Lumbar radiculopathy, chronic  MRI Lumbar Spine Without Contrast      4. Cervical spondylosis  X-Ray Cervical Spine 5 View W Flex Extxt            PLAN:   - Interventions:  Schedule for bilateral L3-5 diagnostic lumbar medial branch block to see if this helps with axial back pain.  We have discussed with significant (80%) relief x2, she may be a candidate for high heat radiofrequency ablation for more sustained relief.  Of note patient received 80% relief exceeding 2 years in duration with prior bilateral L3-5 lumbar RFA. Explained the risks and benefits of the procedure in detail with the patient today in clinic along with alternative treatment options, and the patient  elected to pursue the intervention at this time.      - Anticoagulation use: Yes aspirin  --secondary ppx: CAD  ---per KAY guidelines for secondary prophylaxis, patient can continue aspirin for lumbar medial branch block/potential RFA    -we have discussed considering bilateral lumbar transforaminal epidural steroid injection for lumbar radiculopathy.  We will 1st review updated lumbar MRI.     report:  Reviewed and consistent with medication use as prescribed.    - Medications:  -Continue Gabapentin per outside provider    - Therapy:   We discussed continuing at home physician directed physical therapy to help manage the patient/s painful condition. The patient was counseled that muscle strengthening will improve the long term prognosis in regards to pain and may also help increase range of motion and mobility.     - Imaging: Reviewed available imaging(lumbar x-ray) with patient and answered any questions they had regarding study.  Cervical flexion-extension x-ray and lumbar MRI to better evaluate pain (Gastric clip-MRI conditional card scanned into media)    - Records: Obtain old records from outside physicians and imaging  -[facility]      - Follow up visit:  24 hour call following 1st diagnostic lumbar medial branch block    The above plan and management options were discussed at length with patient. Patient is in agreement with the above and verbalized understanding.    - I discussed the goals of interventional chronic pain management with the patient on today's visit. We discussed a multimodal and systematic approach to pain.  This includes diagnostic and therapeutic injections, adjuvant pharmacologic treatment, physical therapy, and at times psychiatry.  I emphasized the importance of regular exercise, core strengthening and stretching, diet and weight loss as a cornerstone of long-term pain management.    - This condition does not require this patient to take time off of work, and the  primary goal of our Pain Management services is to improve the patient's functional capacity.  - Patient Questions: Answered all of the patient's questions regarding diagnoses, therapy, treatment and next steps    Visit today included increased complexity associated with the care of the episodic problem of chronic pain which was addressed and continue to manage the longitudinal care of the patient due to the serious and/or complex managed problem(s) listed above.    - Direct patient care provided: 20 minutes+. Over 50% of the time was spent counseling/educating the patient. Total time spent on patient care including prep time reviewing the chart before the visit, time spent with patient during the visit, and the time spent after the visit reviewing studies, documenting note, obtaining information from collaborative providers, etc.: >40 minutes.       Jamir Carroll MD  Interventional Pain Management  [facility]    Disclaimer:  This note was prepared using voice recognition system and is likely to have sound alike errors that may have been overlooked even after proof reading.  Please call me with any questions

## 2025-03-04 ENCOUNTER — OFFICE VISIT (OUTPATIENT)
Dept: PAIN MEDICINE | Facility: CLINIC | Age: 65
End: 2025-03-04
Payer: COMMERCIAL

## 2025-03-04 ENCOUNTER — HOSPITAL ENCOUNTER (OUTPATIENT)
Dept: RADIOLOGY | Facility: HOSPITAL | Age: 65
Discharge: HOME | End: 2025-03-04
Attending: ANESTHESIOLOGY
Payer: COMMERCIAL

## 2025-03-04 VITALS
HEART RATE: 77 BPM | DIASTOLIC BLOOD PRESSURE: 74 MMHG | WEIGHT: 145.5 LBS | SYSTOLIC BLOOD PRESSURE: 129 MMHG | RESPIRATION RATE: 17 BRPM | BODY MASS INDEX: 24.24 KG/M2 | HEIGHT: 65 IN

## 2025-03-04 DIAGNOSIS — M51.362 DEGENERATION OF INTERVERTEBRAL DISC OF LUMBAR REGION WITH DISCOGENIC BACK PAIN AND LOWER EXTREMITY PAIN: Primary | ICD-10-CM

## 2025-03-04 DIAGNOSIS — M47.816 LUMBAR SPONDYLOSIS: ICD-10-CM

## 2025-03-04 DIAGNOSIS — M47.812 CERVICAL SPONDYLOSIS: ICD-10-CM

## 2025-03-04 DIAGNOSIS — M54.16 LUMBAR RADICULOPATHY, CHRONIC: ICD-10-CM

## 2025-03-04 PROCEDURE — 99999 PR PBB SHADOW E&M-EST. PATIENT-LVL V: CPT | Mod: PBBFAC,,, | Performed by: ANESTHESIOLOGY

## 2025-03-04 PROCEDURE — G2211 COMPLEX E/M VISIT ADD ON: HCPCS | Mod: S$GLB,,, | Performed by: ANESTHESIOLOGY

## 2025-03-04 PROCEDURE — 99202 OFFICE O/P NEW SF 15 MIN: CPT | Mod: S$GLB,,, | Performed by: ANESTHESIOLOGY

## 2025-03-04 PROCEDURE — 72052 X-RAY EXAM NECK SPINE 6/>VWS: CPT | Mod: 26,,, | Performed by: RADIOLOGY

## 2025-03-04 PROCEDURE — 72052 X-RAY EXAM NECK SPINE 6/>VWS: CPT | Mod: TC

## 2025-03-11 ENCOUNTER — HOSPITAL ENCOUNTER (OUTPATIENT)
Dept: RADIOLOGY | Facility: HOSPITAL | Age: 65
Discharge: HOME | End: 2025-03-11
Attending: ANESTHESIOLOGY
Payer: COMMERCIAL

## 2025-03-11 DIAGNOSIS — M54.16 LUMBAR RADICULOPATHY, CHRONIC: ICD-10-CM

## 2025-03-11 PROCEDURE — 72148 MRI LUMBAR SPINE W/O DYE: CPT | Mod: 26,,, | Performed by: STUDENT IN AN ORGANIZED HEALTH CARE EDUCATION/TRAINING PROGRAM

## 2025-03-11 PROCEDURE — 72148 MRI LUMBAR SPINE W/O DYE: CPT | Mod: TC

## 2025-03-17 NOTE — PRE-PROCEDURE INSTRUCTIONS
Spoke with patient regarding procedure scheduled on 3.25     Arrival time 0645     Has patient been sick with fever or on antibiotics within the last 7 days? No     Does the patient have any open wounds, sores or rashes? No     Does the patient have any recent fractures? no     Has patient received a vaccination within the last 7 days? No     Received the COVID vaccination? yes     Has the patient stopped all medications as directed? na     Does patient have a pacemaker, defibrillator, or implantable stimulator? No     Does the patient have a ride to and from procedure and someone reliable to remain with patient?       Is the patient diabetic? no     Does the patient have sleep apnea? Or use O2 at home? no     Is the patient receiving sedation?      Is the patient instructed to remain NPO beginning at midnight the night before their procedure? yes     Procedure location confirmed with patient? Yes     Covid- Denies signs/symptoms. Instructed to notify PAT/MD if any changes.

## 2025-03-25 ENCOUNTER — HOSPITAL ENCOUNTER (OUTPATIENT)
Facility: HOSPITAL | Age: 65
Discharge: HOME | End: 2025-03-25
Attending: ANESTHESIOLOGY | Admitting: ANESTHESIOLOGY
Payer: COMMERCIAL

## 2025-03-25 VITALS
SYSTOLIC BLOOD PRESSURE: 144 MMHG | WEIGHT: 145.94 LBS | RESPIRATION RATE: 18 BRPM | TEMPERATURE: 98 F | HEIGHT: 65 IN | OXYGEN SATURATION: 98 % | BODY MASS INDEX: 24.32 KG/M2 | HEART RATE: 65 BPM | DIASTOLIC BLOOD PRESSURE: 68 MMHG

## 2025-03-25 DIAGNOSIS — M47.816 LUMBAR SPONDYLOSIS: ICD-10-CM

## 2025-03-25 LAB — POCT GLUCOSE: 98 MG/DL (ref 70–110)

## 2025-03-25 PROCEDURE — 82962 GLUCOSE BLOOD TEST: CPT | Performed by: ANESTHESIOLOGY

## 2025-03-25 PROCEDURE — 63600175 PHARM REV CODE 636 W HCPCS: Performed by: ANESTHESIOLOGY

## 2025-03-25 PROCEDURE — 64493 INJ PARAVERT F JNT L/S 1 LEV: CPT | Mod: 50,,, | Performed by: ANESTHESIOLOGY

## 2025-03-25 PROCEDURE — 64494 INJ PARAVERT F JNT L/S 2 LEV: CPT | Mod: 50,,, | Performed by: ANESTHESIOLOGY

## 2025-03-25 PROCEDURE — 25000003 PHARM REV CODE 250: Performed by: ANESTHESIOLOGY

## 2025-03-25 PROCEDURE — 64493 INJ PARAVERT F JNT L/S 1 LEV: CPT | Mod: 50 | Performed by: ANESTHESIOLOGY

## 2025-03-25 PROCEDURE — 64494 INJ PARAVERT F JNT L/S 2 LEV: CPT | Mod: 50 | Performed by: ANESTHESIOLOGY

## 2025-03-25 RX ORDER — INDOMETHACIN 25 MG/1
CAPSULE ORAL
Status: DISCONTINUED | OUTPATIENT
Start: 2025-03-25 | End: 2025-03-25 | Stop reason: HOSPADM

## 2025-03-25 RX ORDER — BUPIVACAINE HYDROCHLORIDE 5 MG/ML
INJECTION, SOLUTION EPIDURAL; INTRACAUDAL; PERINEURAL
Status: DISCONTINUED | OUTPATIENT
Start: 2025-03-25 | End: 2025-03-25 | Stop reason: HOSPADM

## 2025-03-25 RX ORDER — MIDAZOLAM HYDROCHLORIDE 1 MG/ML
INJECTION, SOLUTION INTRAMUSCULAR; INTRAVENOUS
Status: DISCONTINUED | OUTPATIENT
Start: 2025-03-25 | End: 2025-03-25 | Stop reason: HOSPADM

## 2025-03-25 RX ORDER — FENTANYL CITRATE 50 UG/ML
INJECTION, SOLUTION INTRAMUSCULAR; INTRAVENOUS
Status: DISCONTINUED | OUTPATIENT
Start: 2025-03-25 | End: 2025-03-25 | Stop reason: HOSPADM

## 2025-03-25 NOTE — DISCHARGE SUMMARY
Discharge Note  Short Stay      SUMMARY     Admit Date: 3/25/2025    Attending Physician: Jamir Carroll MD        Discharge Physician: Jamir Carroll MD        Discharge Date: 3/25/2025 8:52 AM    Procedure(s) (LRB):  Bilateral L3-4, L4-5 MBB #1 with local (Bilateral)    Final Diagnosis: Lumbar spondylosis [M47.816]    Disposition: Home or self care    Patient Instructions:   Discharge Medication List as of 3/25/2025  7:11 AM        CONTINUE these medications which have NOT CHANGED    Details   amLODIPine (NORVASC) 5 MG tablet Take 1 tablet (5 mg total) by mouth once daily., Starting Mon 2/24/2025, Normal      aspirin 81 MG Chew Take 81 mg by mouth once daily., Historical Med      diphenhydrAMINE (BENADRYL) 25 mg capsule Take 25 mg by mouth. Before infusions, Historical Med      ezetimibe (ZETIA) 10 mg tablet Take 1 tablet (10 mg total) by mouth once daily., Starting Mon 2/24/2025, Normal      gabapentin (NEURONTIN) 300 MG capsule Take 300 mg by mouth every evening. , Historical Med      hydroCHLOROthiazide 12.5 MG Tab Take 1 tablet (12.5 mg total) by mouth once daily., Starting Mon 2/24/2025, Normal      Immune Globulin G, IGG,-PRO-IGA 10 % injection, Privigen, (PRIVIGEN) 10 % Soln Inject 300 mLs (30 g total) into the vein every 28 days., Starting Mon 11/30/2020, Print      metFORMIN (GLUCOPHAGE) 500 MG tablet TAKE 1 TABLET(500 MG) BY MOUTH DAILY WITH BREAKFAST, Starting Thu 1/9/2025, Normal      predniSONE (DELTASONE) 10 MG tablet TAKE 1 TABLET BY MOUTH EVERY DAY AS NEEDED, Normal      TRELEGY ELLIPTA 200-62.5-25 mcg inhaler INHALE 1 PUFF INTO THE LUNGS EVERY DAY, Normal      albuterol (PROVENTIL/VENTOLIN HFA) 90 mcg/actuation inhaler INHALE 2 PUFFS INTO THE LUNGS EVERY 6 HOURS AS NEEDED. RESCUE, Normal      nebulizer and compressor Beatrice Use to deliver nebulizer medications up to 6 times a day, Normal                 Discharge Diagnosis: Lumbar spondylosis [M47.816]  Condition on Discharge: Stable with no  complications to procedure   Diet on Discharge: Same as before.  Activity: as per instruction sheet.  Discharge to: Home with a responsible adult.  Follow up: 2-4 weeks       Please call the office at [phone] if you experience any weakness or loss of sensation, fever > 101.5, pain uncontrolled with oral medications, persistent nausea/vomiting/or diarrhea, redness or drainage from the incisions, or any other worrisome concerns. If physician on call was not reached or could not communicate with our office for any reason please go to the nearest emergency department

## 2025-03-25 NOTE — OP NOTE
María Wilhelm [place]  65 y.o. female      Vitals:    03/25/25 0840   BP: (!) 144/68   Pulse: 65   Resp: 18   Temp:        Procedure Date: 03/25/2025          INFORMED CONSENT: The procedure, risks, benefits and options were discussed with patient. There are no contraindications to the procedure. The patient expressed understanding and agreed to proceed. The personnel performing the procedure was discussed. I verify that I personally obtained consent prior to the start of the procedure and the signed consent can be found on the patient's chart.       Anesthesia:   Conscious sedation provided by M.D    The patient was monitored with continuous pulse oximetry, EKG, and intermittent blood pressure monitors.  The patient was hemodynamically stable throughout the entire process was responsive to voice, and breathing spontaneously.  Supplemental O2 was provided at 2L/min via nasal cannula.  Patient was comfortable for the duration of the procedure. (See nurse documentation and case log for sedation time)    There was a total of 1mg IV Midazolam and 75mcg Fentanyl titrated for the procedure     Pre Procedure diagnosis: Lumbar spondylosis [M47.816]  Post-Procedure diagnosis: SAME     PROCEDURE: bilateral L3,4,5 LUMBAR FACET MEDIAL BRANCH NERVE BLOCK        DESCRIPTION OF PROCEDURE:The patient was brought to the procedure room. After performing time out. IV access was obtained prior to the procedure. The patient was positioned prone on the fluoroscopy table. Continuous hemodynamic monitoring was initiated including blood pressure, EKG, and pulse oximetry. The area of the lumbar spine was prepped chlorhexidine and draped into a sterile field. Fluoroscopy was used to identify the location of the bilateral side L3, L4, and L5 medial branch nerves at the junctions of the superior articular process and the transverse processes of  L4, L5, and the sacral ala respectively. Skin anesthesia was achieved using 5 cc of Lidocaine 1%  "over the injection sites. A 22 gauge, 3 1/2" spinal needle was slowly inserted at each level using AP, lateral and oblique fluoroscopic imaging. Negative aspiration for blood or CSF was confirmed.  8 ml bupivacaine 0.25% ONLY was injected at all sites in divided doses. The needles were removed and bleeding was nil. A sterile dressing was applied. No specimens collected. Patient was taken back to the PACU for observation .       Blood Loss: Nill  Specimen: None    Jamir S Carroll    "

## 2025-03-25 NOTE — DISCHARGE INSTRUCTIONS

## 2025-03-26 ENCOUNTER — TELEPHONE (OUTPATIENT)
Dept: PAIN MEDICINE | Facility: CLINIC | Age: 65
End: 2025-03-26
Payer: COMMERCIAL

## 2025-03-26 DIAGNOSIS — M47.816 LUMBAR SPONDYLOSIS: Primary | ICD-10-CM

## 2025-03-26 NOTE — TELEPHONE ENCOUNTER
Called pt to set up their procedure. Pt answered and procedure has been made. Inform pt on the procedure instruction.Pt understood and all question answered.   - Anticoagulation use: Yes aspirin  --secondary ppx: CAD  ---per KAY guidelines for secondary prophylaxis, patient can continue aspirin for lumbar medial branch block/potential RFA

## 2025-03-26 NOTE — TELEPHONE ENCOUNTER
Reached out to pt to get their percent relief from the injection that they had and to answer the following questions.    Lumbar MBB #1           1. What percentage of pain relief did you receive following the block, from 0-100%?     95%    2. What was pain score the day before your procedure on a scale from 0-10?    8/10    3. What was pain score immediately after your procedure (up to 6 hours)  on a scale from 0-10?    0/10    4. When your pain returned, what was your pain score on a scale from 0-10?     1/10    5. How many hours did pain relief last following the block?       Still in affect     6. During this time, please describe in detail the activities you were able to do?  Pt hand bend a squat down       Pain Disability Index (PDI) Score Review:      3/4/2025    12:21 PM 11/23/2021     2:51 PM   Last 3 PDI Scores   Pain Disability Index (PDI) 40 25

## 2025-04-04 NOTE — PRE-PROCEDURE INSTRUCTIONS
Spoke with patient regarding procedure scheduled on 4.22.     Arrival time 0630     Has patient been sick with fever or on antibiotics within the last 7 days? No     Does the patient have any open wounds, sores or rashes? No     Does the patient have any recent fractures? no     Has patient received a vaccination within the last 7 days? No     Received the COVID vaccination?      Has the patient stopped all medications as directed? na     Does patient have a pacemaker, defibrillator, or implantable stimulator? No     Does the patient have a ride to and from procedure and someone reliable to remain with patient?       Is the patient diabetic? pre, on meds     Does the patient have sleep apnea? Or use O2 at home? no     Is the patient receiving sedation?      Is the patient instructed to remain NPO beginning at midnight the night before their procedure? yes     Procedure location confirmed with patient? Yes     Covid- Denies signs/symptoms. Instructed to notify PAT/MD if any changes.

## 2025-04-08 ENCOUNTER — OFFICE VISIT (OUTPATIENT)
Dept: DERMATOLOGY | Facility: CLINIC | Age: 65
End: 2025-04-08
Payer: COMMERCIAL

## 2025-04-08 VITALS — HEIGHT: 65 IN | WEIGHT: 145.94 LBS | BODY MASS INDEX: 24.32 KG/M2

## 2025-04-08 DIAGNOSIS — Z12.83 SCREENING, MALIGNANT NEOPLASM, SKIN: ICD-10-CM

## 2025-04-08 DIAGNOSIS — L82.1 SEBORRHEIC KERATOSES: Primary | ICD-10-CM

## 2025-04-08 DIAGNOSIS — L30.9 DERMATITIS: ICD-10-CM

## 2025-04-08 DIAGNOSIS — D22.9 MULTIPLE BENIGN NEVI: ICD-10-CM

## 2025-04-08 PROCEDURE — G2211 COMPLEX E/M VISIT ADD ON: HCPCS | Mod: S$GLB,,, | Performed by: STUDENT IN AN ORGANIZED HEALTH CARE EDUCATION/TRAINING PROGRAM

## 2025-04-08 PROCEDURE — 1101F PT FALLS ASSESS-DOCD LE1/YR: CPT | Mod: CPTII,S$GLB,, | Performed by: STUDENT IN AN ORGANIZED HEALTH CARE EDUCATION/TRAINING PROGRAM

## 2025-04-08 PROCEDURE — 1159F MED LIST DOCD IN RCRD: CPT | Mod: CPTII,S$GLB,, | Performed by: STUDENT IN AN ORGANIZED HEALTH CARE EDUCATION/TRAINING PROGRAM

## 2025-04-08 PROCEDURE — 1160F RVW MEDS BY RX/DR IN RCRD: CPT | Mod: CPTII,S$GLB,, | Performed by: STUDENT IN AN ORGANIZED HEALTH CARE EDUCATION/TRAINING PROGRAM

## 2025-04-08 PROCEDURE — 99999 PR PBB SHADOW E&M-EST. PATIENT-LVL III: CPT | Mod: PBBFAC,,, | Performed by: STUDENT IN AN ORGANIZED HEALTH CARE EDUCATION/TRAINING PROGRAM

## 2025-04-08 PROCEDURE — 99213 OFFICE O/P EST LOW 20 MIN: CPT | Mod: S$GLB,,, | Performed by: STUDENT IN AN ORGANIZED HEALTH CARE EDUCATION/TRAINING PROGRAM

## 2025-04-08 PROCEDURE — 3008F BODY MASS INDEX DOCD: CPT | Mod: CPTII,S$GLB,, | Performed by: STUDENT IN AN ORGANIZED HEALTH CARE EDUCATION/TRAINING PROGRAM

## 2025-04-08 PROCEDURE — 3288F FALL RISK ASSESSMENT DOCD: CPT | Mod: CPTII,S$GLB,, | Performed by: STUDENT IN AN ORGANIZED HEALTH CARE EDUCATION/TRAINING PROGRAM

## 2025-04-08 NOTE — PROGRESS NOTES
Subjective:       Patient ID:  María Julian is a 65 y.o. female who presents for   Chief Complaint   Patient presents with    Skin Check     Annual skin check, left shoulder mole that had some bleeding, x 1 month     History of Present Illness: The patient presents for follow up of skin check. Last seen in 2022. Patient reports recently having a mole/growth on the shoulder that started bleeding. Mole has been present for years, but recently started to become symptomatic. Unsure what caused the bleeding and irritation, but it last several days to weeks. Finally, has stopped being symptomatic and is back to baseline. No other rapidly growing, bleeding or non healing skin lesions.         Review of Systems   Constitutional:  Negative for fever and chills.   Skin:  Positive for rash (Patient has a splotchy reddish rash on the back and stomach area. But not bothersome, no symptoms).        Objective:    Physical Exam   Constitutional: She appears well-developed and well-nourished. No distress.   Neurological: She is alert and oriented to person, place, and time. She is not disoriented.   Psychiatric: She has a normal mood and affect.   Skin:   Areas Examined (abnormalities noted in diagram):   Scalp / Hair Palpated and Inspected  Head / Face Inspection Performed  Neck Inspection Performed  Chest / Axilla Inspection Performed  Abdomen Inspection Performed  Genitals / Buttocks / Groin Inspection Performed  Back Inspection Performed  RUE Inspected  LUE Inspection Performed  RLE Inspected  LLE Inspection Performed  Nails and Digits Inspection Performed              Diagram Legend     Erythematous scaling macule/papule c/w actinic keratosis       Vascular papule c/w angioma      Pigmented verrucoid papule/plaque c/w seborrheic keratosis      Yellow umbilicated papule c/w sebaceous hyperplasia      Irregularly shaped tan macule c/w lentigo     1-2 mm smooth white papules consistent with Milia      Movable  subcutaneous cyst with punctum c/w epidermal inclusion cyst      Subcutaneous movable cyst c/w pilar cyst      Firm pink to brown papule c/w dermatofibroma      Pedunculated fleshy papule(s) c/w skin tag(s)      Evenly pigmented macule c/w junctional nevus     Mildly variegated pigmented, slightly irregular-bordered macule c/w mildly atypical nevus      Flesh colored to evenly pigmented papule c/w intradermal nevus       Pink pearly papule/plaque c/w basal cell carcinoma      Erythematous hyperkeratotic cursted plaque c/w SCC      Surgical scar with no sign of skin cancer recurrence      Open and closed comedones      Inflammatory papules and pustules      Verrucoid papule consistent consistent with wart     Erythematous eczematous patches and plaques     Dystrophic onycholytic nail with subungual debris c/w onychomycosis     Umbilicated papule    Erythematous-base heme-crusted tan verrucoid plaque consistent with inflamed seborrheic keratosis     Erythematous Silvery Scaling Plaque c/w Psoriasis     See annotation      Assessment / Plan:        Seborrheic keratoses  These are benign inherited growths without a malignant potential. Reassurance given to patient. No treatment is necessary.     Multiple benign nevi  Screening, malignant neoplasm, skin  Upper and lower body skin examination performed today including at least 10 points as noted in physical examination. No lesions suspicious for malignancy noted.  Reassurance provided.    Instructed patient to observe lesion(s) for changes and follow up in clinic if changes are noted. Patient to monitor skin at home for new or changing lesions and follow up in clinic if noted.      Dermatitis - asymptomatic. Recommend antihistamine to help with redness (if bothersome). Monitor for any worsening or development of symptoms.              Follow up in about 1 year (around 4/8/2026).

## 2025-04-22 ENCOUNTER — HOSPITAL ENCOUNTER (OUTPATIENT)
Facility: HOSPITAL | Age: 65
Discharge: HOME | End: 2025-04-22
Attending: ANESTHESIOLOGY | Admitting: ANESTHESIOLOGY
Payer: COMMERCIAL

## 2025-04-22 VITALS
TEMPERATURE: 98 F | OXYGEN SATURATION: 94 % | DIASTOLIC BLOOD PRESSURE: 65 MMHG | BODY MASS INDEX: 23.83 KG/M2 | HEART RATE: 65 BPM | WEIGHT: 143.06 LBS | SYSTOLIC BLOOD PRESSURE: 118 MMHG | RESPIRATION RATE: 16 BRPM | HEIGHT: 65 IN

## 2025-04-22 DIAGNOSIS — M47.816 LUMBAR SPONDYLOSIS: ICD-10-CM

## 2025-04-22 LAB — POCT GLUCOSE: 103 MG/DL (ref 70–110)

## 2025-04-22 PROCEDURE — 25000003 PHARM REV CODE 250: Performed by: ANESTHESIOLOGY

## 2025-04-22 PROCEDURE — 64493 INJ PARAVERT F JNT L/S 1 LEV: CPT | Mod: 50,,, | Performed by: ANESTHESIOLOGY

## 2025-04-22 PROCEDURE — 82962 GLUCOSE BLOOD TEST: CPT | Performed by: ANESTHESIOLOGY

## 2025-04-22 PROCEDURE — 64494 INJ PARAVERT F JNT L/S 2 LEV: CPT | Mod: 50,,, | Performed by: ANESTHESIOLOGY

## 2025-04-22 PROCEDURE — 64494 INJ PARAVERT F JNT L/S 2 LEV: CPT | Mod: 50 | Performed by: ANESTHESIOLOGY

## 2025-04-22 PROCEDURE — 64493 INJ PARAVERT F JNT L/S 1 LEV: CPT | Mod: 50 | Performed by: ANESTHESIOLOGY

## 2025-04-22 PROCEDURE — 63600175 PHARM REV CODE 636 W HCPCS: Performed by: ANESTHESIOLOGY

## 2025-04-22 RX ORDER — SODIUM BICARBONATE 1 MEQ/ML
SYRINGE (ML) INTRAVENOUS
Status: DISCONTINUED | OUTPATIENT
Start: 2025-04-22 | End: 2025-04-22 | Stop reason: HOSPADM

## 2025-04-22 RX ORDER — FENTANYL CITRATE 50 UG/ML
INJECTION, SOLUTION INTRAMUSCULAR; INTRAVENOUS
Status: DISCONTINUED | OUTPATIENT
Start: 2025-04-22 | End: 2025-04-22 | Stop reason: HOSPADM

## 2025-04-22 RX ORDER — BUPIVACAINE HYDROCHLORIDE 5 MG/ML
INJECTION, SOLUTION EPIDURAL; INTRACAUDAL; PERINEURAL
Status: DISCONTINUED | OUTPATIENT
Start: 2025-04-22 | End: 2025-04-22 | Stop reason: HOSPADM

## 2025-04-22 RX ORDER — MIDAZOLAM HYDROCHLORIDE 1 MG/ML
INJECTION, SOLUTION INTRAMUSCULAR; INTRAVENOUS
Status: DISCONTINUED | OUTPATIENT
Start: 2025-04-22 | End: 2025-04-22 | Stop reason: HOSPADM

## 2025-04-22 NOTE — H&P
HPI  Patient presenting for Procedure(s) (LRB):  Bilateral L3-5 MBB #2 with local (Bilateral)     Patient on Anti-coagulation No    No health changes since previous encounter    Past Medical History:   Diagnosis Date    Abnormal Pap smear of cervix     Allergy     Asthma     COPD (chronic obstructive pulmonary disease)     Malignant hyperthermia     pt's mother has hx of MH.  Pt denies any complications with general anesthesia    Recurrent upper respiratory infection (URI)      Past Surgical History:   Procedure Laterality Date    BONE SPUR EXCISION SHOULDER Right     CARPAL TUNNEL RELEASE Left      SECTION      x 2    CHOLECYSTECTOMY  2018    DILATION AND CURETTAGE OF UTERUS      EXAMINATION UNDER ANESTHESIA N/A 2021    Procedure: Exam Under Anesthesia;  Surgeon: Don Allen MD;  Location: [facility] OR;  Service: Oncology;  Laterality: N/A;    FOOT TENDON SURGERY Bilateral     HYSTERECTOMY  1999    fibroid    INJECTION OF ANESTHETIC AGENT AROUND MEDIAL BRANCH NERVES INNERVATING LUMBAR FACET JOINT Right 10/28/2021    Procedure: Block-nerve-medial branch-lumbar Right L3, 4,5 MBB with RN IV sedation;  Surgeon: Lei Copeland MD;  Location: [facility] PAIN MGT;  Service: Pain Management;  Laterality: Right;    INJECTION OF ANESTHETIC AGENT AROUND MEDIAL BRANCH NERVES INNERVATING LUMBAR FACET JOINT Bilateral 2021    Procedure: Block-nerve-medial branch-lumbar bilateral L3, 4,5 MBB RN IV sedation;  Surgeon: Lei Copeland MD;  Location: [facility] PAIN [facility]GT;  Service: Pain Management;  Laterality: Bilateral;    INJECTION OF ANESTHETIC AGENT AROUND MEDIAL BRANCH NERVES INNERVATING LUMBAR FACET JOINT Bilateral 3/14/2022    Procedure: Block-nerve-medial branch-lumbar L3, 4 5 NO STEROIDS RN IV sedation;  Surgeon: Lei Copeland MD;  Location: [facility] PAIN [facility]GT;  Service: Pain Management;  Laterality: Bilateral;    INJECTION OF ANESTHETIC AGENT AROUND MEDIAL BRANCH NERVES INNERVATING LUMBAR FACET JOINT Bilateral 3/21/2022     "Procedure: Block-nerve-medial branch-lumbar bilateral L3, 4,5 RN IV sedation NO STERIOD DIAGNOSTIC ONLY;  Surgeon: Lei Copeland MD;  Location: [facility] PAIN [facility]GT;  Service: Pain Management;  Laterality: Bilateral;    INJECTION OF ANESTHETIC AGENT AROUND MEDIAL BRANCH NERVES INNERVATING LUMBAR FACET JOINT Bilateral 3/25/2025    Procedure: Bilateral L3-4, L4-5 MBB #1 with local;  Surgeon: Jamir Carroll MD;  Location: [facility] PAIN [facility]GT;  Service: Pain Management;  Laterality: Bilateral;    LASER ABLATION N/A 6/16/2021    Procedure: ABLATION, USING LASER;  Surgeon: Don Allen MD;  Location: [facility] OR;  Service: Oncology;  Laterality: N/A;    MICRODISCECTOMY OF SPINE      OOPHORECTOMY Bilateral 1999    RADIOFREQUENCY THERMOCOAGULATION Bilateral 4/7/2022    Procedure: RADIOFREQUENCY THERMAL COAGULATION bilateral L3, 4,5 mbb RFA RN IV sedation;  Surgeon: Lei Copeland MD;  Location: [facility] PAIN [facility]GT;  Service: Pain Management;  Laterality: Bilateral;    TONSILLECTOMY      TRIGGER FINGER RELEASE Left     TUBAL LIGATION Bilateral 1982     Review of patient's allergies indicates:   Allergen Reactions    Erythromycin Anaphylaxis    Macrolide antibiotics Anaphylaxis    Iodine and iodide containing products Hives    Levofloxacin      Pains in her tendons/joints        Medications Ordered Prior to Encounter[1]     PMHx, PSHx, Allergies, Medications reviewed in epic    ROS negative except pain complaints in HPI    OBJECTIVE:    /73 (BP Location: Right arm, Patient Position: Sitting)   Pulse 69   Temp 96.9 °F (36.1 °C) (Temporal)   Resp 16   Ht 5' 5" (1.651 m)   Wt 64.9 kg (143 lb 1.3 oz)   SpO2 96%   Breastfeeding No   BMI 23.81 kg/m²     PHYSICAL EXAMINATION:    GENERAL: Well appearing, in no acute distress, alert and oriented x3.  PSYCH:  Mood and affect appropriate.  SKIN: Skin color, texture, turgor normal, no rashes or lesions which will impact the procedure.  CV: RRR with palpation of the radial artery.  PULM: No " evidence of respiratory difficulty, symmetric chest rise. Clear to auscultation.  NEURO: Cranial nerves grossly intact.    Plan:    Proceed with procedure as planned Procedure(s) (LRB):  Bilateral L3-5 MBB #2 with local (Bilateral)    Jamir Carroll MD  04/22/2025                 [1]   No current facility-administered medications on file prior to encounter.     Current Outpatient Medications on File Prior to Encounter   Medication Sig Dispense Refill    amLODIPine (NORVASC) 5 MG tablet Take 1 tablet (5 mg total) by mouth once daily. 90 tablet 3    hydroCHLOROthiazide 12.5 MG Tab Take 1 tablet (12.5 mg total) by mouth once daily. 90 tablet 3    metFORMIN (GLUCOPHAGE) 500 MG tablet TAKE 1 TABLET(500 MG) BY MOUTH DAILY WITH BREAKFAST 90 tablet 3    albuterol (PROVENTIL/VENTOLIN HFA) 90 mcg/actuation inhaler INHALE 2 PUFFS INTO THE LUNGS EVERY 6 HOURS AS NEEDED. RESCUE (Patient not taking: Reported on 4/8/2025) 8.5 g 4    aspirin 81 MG Chew Take 81 mg by mouth once daily.      diphenhydrAMINE (BENADRYL) 25 mg capsule Take 25 mg by mouth. Before infusions      ezetimibe (ZETIA) 10 mg tablet Take 1 tablet (10 mg total) by mouth once daily. 90 tablet 3    gabapentin (NEURONTIN) 300 MG capsule Take 300 mg by mouth every evening.       Immune Globulin G, IGG,-PRO-IGA 10 % injection, Privigen, (PRIVIGEN) 10 % Soln Inject 300 mLs (30 g total) into the vein every 28 days. 300 mL 11    nebulizer and compressor Beatrice Use to deliver nebulizer medications up to 6 times a day (Patient not taking: Reported on 4/8/2025) 1 each 0    predniSONE (DELTASONE) 10 MG tablet TAKE 1 TABLET BY MOUTH EVERY DAY AS NEEDED 30 tablet 0    TRELEGY ELLIPTA 200-62.5-25 mcg inhaler INHALE 1 PUFF INTO THE LUNGS EVERY DAY 60 each 7

## 2025-04-22 NOTE — DISCHARGE INSTRUCTIONS

## 2025-04-22 NOTE — OP NOTE
María Wilhelm Mequon  65 y.o. female      Vitals:    04/22/25 0737   BP: 123/73   Pulse: 70   Resp: 18   Temp:        Procedure Date: 04/22/2025          INFORMED CONSENT: The procedure, risks, benefits and options were discussed with patient. There are no contraindications to the procedure. The patient expressed understanding and agreed to proceed. The personnel performing the procedure was discussed. I verify that I personally obtained consent prior to the start of the procedure and the signed consent can be found on the patient's chart.       Anesthesia:   Conscious sedation provided by M.D    The patient was monitored with continuous pulse oximetry, EKG, and intermittent blood pressure monitors.  The patient was hemodynamically stable throughout the entire process was responsive to voice, and breathing spontaneously.  Supplemental O2 was provided at 2L/min via nasal cannula.  Patient was comfortable for the duration of the procedure. (See nurse documentation and case log for sedation time)    There was a total of 1mg IV Midazolam and 50mcg Fentanyl titrated for the procedure     Pre Procedure diagnosis: Lumbar spondylosis [M47.816]  Post-Procedure diagnosis: SAME     PROCEDURE: bilateral L3,4,5 LUMBAR FACET MEDIAL BRANCH NERVE BLOCK        DESCRIPTION OF PROCEDURE:The patient was brought to the procedure room. After performing time out. IV access was obtained prior to the procedure. The patient was positioned prone on the fluoroscopy table. Continuous hemodynamic monitoring was initiated including blood pressure, EKG, and pulse oximetry. The area of the lumbar spine was prepped chlorhexidine and draped into a sterile field. Fluoroscopy was used to identify the location of the bilateral side L3, L4, and L5 medial branch nerves at the junctions of the superior articular process and the transverse processes of  L4, L5, and the sacral ala respectively. Skin anesthesia was achieved using 5 cc of Lidocaine 1% over  "the injection sites. A 22 gauge, 3 1/2" spinal needle was slowly inserted at each level using AP, lateral and oblique fluoroscopic imaging. Negative aspiration for blood or CSF was confirmed.  8 ml bupivacaine 0.25% ONLY was injected at all sites in divided doses. The needles were removed and bleeding was nil. A sterile dressing was applied. No specimens collected. Patient was taken back to the PACU for observation .       Blood Loss: Nill  Specimen: None    Jamir Carroll    "

## 2025-04-22 NOTE — H&P (VIEW-ONLY)
HPI  Patient presenting for Procedure(s) (LRB):  Bilateral L3-5 MBB #2 with local (Bilateral)     Patient on Anti-coagulation No    No health changes since previous encounter    Past Medical History:   Diagnosis Date    Abnormal Pap smear of cervix     Allergy     Asthma     COPD (chronic obstructive pulmonary disease)     Malignant hyperthermia     pt's mother has hx of MH.  Pt denies any complications with general anesthesia    Recurrent upper respiratory infection (URI)      Past Surgical History:   Procedure Laterality Date    BONE SPUR EXCISION SHOULDER Right     CARPAL TUNNEL RELEASE Left      SECTION      x 2    CHOLECYSTECTOMY  2018    DILATION AND CURETTAGE OF UTERUS      EXAMINATION UNDER ANESTHESIA N/A 2021    Procedure: Exam Under Anesthesia;  Surgeon: Don Allen MD;  Location: [facility] OR;  Service: Oncology;  Laterality: N/A;    FOOT TENDON SURGERY Bilateral     HYSTERECTOMY  1999    fibroid    INJECTION OF ANESTHETIC AGENT AROUND MEDIAL BRANCH NERVES INNERVATING LUMBAR FACET JOINT Right 10/28/2021    Procedure: Block-nerve-medial branch-lumbar Right L3, 4,5 MBB with RN IV sedation;  Surgeon: Lei Copeland MD;  Location: [facility] PAIN MGT;  Service: Pain Management;  Laterality: Right;    INJECTION OF ANESTHETIC AGENT AROUND MEDIAL BRANCH NERVES INNERVATING LUMBAR FACET JOINT Bilateral 2021    Procedure: Block-nerve-medial branch-lumbar bilateral L3, 4,5 MBB RN IV sedation;  Surgeon: Lei Copeland MD;  Location: [facility] PAIN [facility]GT;  Service: Pain Management;  Laterality: Bilateral;    INJECTION OF ANESTHETIC AGENT AROUND MEDIAL BRANCH NERVES INNERVATING LUMBAR FACET JOINT Bilateral 3/14/2022    Procedure: Block-nerve-medial branch-lumbar L3, 4 5 NO STEROIDS RN IV sedation;  Surgeon: Lei Copeland MD;  Location: [facility] PAIN [facility]GT;  Service: Pain Management;  Laterality: Bilateral;    INJECTION OF ANESTHETIC AGENT AROUND MEDIAL BRANCH NERVES INNERVATING LUMBAR FACET JOINT Bilateral 3/21/2022     "Procedure: Block-nerve-medial branch-lumbar bilateral L3, 4,5 RN IV sedation NO STERIOD DIAGNOSTIC ONLY;  Surgeon: Lei Copeland MD;  Location: [facility] PAIN MGT;  Service: Pain Management;  Laterality: Bilateral;    INJECTION OF ANESTHETIC AGENT AROUND MEDIAL BRANCH NERVES INNERVATING LUMBAR FACET JOINT Bilateral 3/25/2025    Procedure: Bilateral L3-4, L4-5 MBB #1 with local;  Surgeon: Jamir Carroll MD;  Location: [facility] PAIN [facility]GT;  Service: Pain Management;  Laterality: Bilateral;    LASER ABLATION N/A 6/16/2021    Procedure: ABLATION, USING LASER;  Surgeon: Don Allen MD;  Location: [facility] OR;  Service: Oncology;  Laterality: N/A;    MICRODISCECTOMY OF SPINE      OOPHORECTOMY Bilateral 1999    RADIOFREQUENCY THERMOCOAGULATION Bilateral 4/7/2022    Procedure: RADIOFREQUENCY THERMAL COAGULATION bilateral L3, 4,5 mbb RFA RN IV sedation;  Surgeon: Lei Copeland MD;  Location: [facility] PAIN [facility]GT;  Service: Pain Management;  Laterality: Bilateral;    TONSILLECTOMY      TRIGGER FINGER RELEASE Left     TUBAL LIGATION Bilateral 1982     Review of patient's allergies indicates:   Allergen Reactions    Erythromycin Anaphylaxis    Macrolide antibiotics Anaphylaxis    Iodine and iodide containing products Hives    Levofloxacin      Pains in her tendons/joints        Medications Ordered Prior to Encounter[1]     PMHx, PSHx, Allergies, Medications reviewed in epic    ROS negative except pain complaints in HPI    OBJECTIVE:    /73 (BP Location: Right arm, Patient Position: Sitting)   Pulse 69   Temp 96.9 °F (36.1 °C) (Temporal)   Resp 16   Ht 5' 5" (1.651 m)   Wt 64.9 kg (143 lb 1.3 oz)   SpO2 96%   Breastfeeding No   BMI 23.81 kg/m²     PHYSICAL EXAMINATION:    GENERAL: Well appearing, in no acute distress, alert and oriented x3.  PSYCH:  Mood and affect appropriate.  SKIN: Skin color, texture, turgor normal, no rashes or lesions which will impact the procedure.  CV: RRR with palpation of the radial artery.  PULM: No " evidence of respiratory difficulty, symmetric chest rise. Clear to auscultation.  NEURO: Cranial nerves grossly intact.    Plan:    Proceed with procedure as planned Procedure(s) (LRB):  Bilateral L3-5 MBB #2 with local (Bilateral)    Jamir Carroll MD  04/22/2025                 [1]   No current facility-administered medications on file prior to encounter.     Current Outpatient Medications on File Prior to Encounter   Medication Sig Dispense Refill    amLODIPine (NORVASC) 5 MG tablet Take 1 tablet (5 mg total) by mouth once daily. 90 tablet 3    hydroCHLOROthiazide 12.5 MG Tab Take 1 tablet (12.5 mg total) by mouth once daily. 90 tablet 3    metFORMIN (GLUCOPHAGE) 500 MG tablet TAKE 1 TABLET(500 MG) BY MOUTH DAILY WITH BREAKFAST 90 tablet 3    albuterol (PROVENTIL/VENTOLIN HFA) 90 mcg/actuation inhaler INHALE 2 PUFFS INTO THE LUNGS EVERY 6 HOURS AS NEEDED. RESCUE (Patient not taking: Reported on 4/8/2025) 8.5 g 4    aspirin 81 MG Chew Take 81 mg by mouth once daily.      diphenhydrAMINE (BENADRYL) 25 mg capsule Take 25 mg by mouth. Before infusions      ezetimibe (ZETIA) 10 mg tablet Take 1 tablet (10 mg total) by mouth once daily. 90 tablet 3    gabapentin (NEURONTIN) 300 MG capsule Take 300 mg by mouth every evening.       Immune Globulin G, IGG,-PRO-IGA 10 % injection, Privigen, (PRIVIGEN) 10 % Soln Inject 300 mLs (30 g total) into the vein every 28 days. 300 mL 11    nebulizer and compressor Beatrice Use to deliver nebulizer medications up to 6 times a day (Patient not taking: Reported on 4/8/2025) 1 each 0    predniSONE (DELTASONE) 10 MG tablet TAKE 1 TABLET BY MOUTH EVERY DAY AS NEEDED 30 tablet 0    TRELEGY ELLIPTA 200-62.5-25 mcg inhaler INHALE 1 PUFF INTO THE LUNGS EVERY DAY 60 each 7

## 2025-04-22 NOTE — DISCHARGE SUMMARY
Discharge Note  Short Stay      SUMMARY     Admit Date: 4/22/2025    Attending Physician: Jamir Carroll MD        Discharge Physician: Jamir Carroll MD        Discharge Date: 4/22/2025 7:38 AM    Procedure(s) (LRB):  Bilateral L3-5 MBB #2 with local (Bilateral)    Final Diagnosis: Lumbar spondylosis [M47.816]    Disposition: Home or self care    Patient Instructions:   Current Discharge Medication List        CONTINUE these medications which have NOT CHANGED    Details   amLODIPine (NORVASC) 5 MG tablet Take 1 tablet (5 mg total) by mouth once daily.  Qty: 90 tablet, Refills: 3    Comments: .  Associated Diagnoses: Essential hypertension      hydroCHLOROthiazide 12.5 MG Tab Take 1 tablet (12.5 mg total) by mouth once daily.  Qty: 90 tablet, Refills: 3    Comments: .  Associated Diagnoses: Essential hypertension      metFORMIN (GLUCOPHAGE) 500 MG tablet TAKE 1 TABLET(500 MG) BY MOUTH DAILY WITH BREAKFAST  Qty: 90 tablet, Refills: 3    Associated Diagnoses: Prediabetes      albuterol (PROVENTIL/VENTOLIN HFA) 90 mcg/actuation inhaler INHALE 2 PUFFS INTO THE LUNGS EVERY 6 HOURS AS NEEDED. RESCUE  Qty: 8.5 g, Refills: 4    Associated Diagnoses: Moderate persistent asthma without complication      aspirin 81 MG Chew Take 81 mg by mouth once daily.      diphenhydrAMINE (BENADRYL) 25 mg capsule Take 25 mg by mouth. Before infusions      ezetimibe (ZETIA) 10 mg tablet Take 1 tablet (10 mg total) by mouth once daily.  Qty: 90 tablet, Refills: 3    Associated Diagnoses: Coronary artery calcification seen on CAT scan      gabapentin (NEURONTIN) 300 MG capsule Take 300 mg by mouth every evening.       Immune Globulin G, IGG,-PRO-IGA 10 % injection, Privigen, (PRIVIGEN) 10 % Soln Inject 300 mLs (30 g total) into the vein every 28 days.  Qty: 300 mL, Refills: 11    Comments: Please given 10 mg of prednisone 30 minutes prior  Benadryl 25 mg prior  Tylenol 325 mg 30 minutes prior  Normal saline 500 ml 30 minutes prior  Epipen 0.3ml  as needed for anaphylaxis or a severe allergic reaction      nebulizer and compressor Beatrice Use to deliver nebulizer medications up to 6 times a day  Qty: 1 each, Refills: 0    Associated Diagnoses: Asthma-COPD overlap syndrome; Moderate persistent asthma without complication      predniSONE (DELTASONE) 10 MG tablet TAKE 1 TABLET BY MOUTH EVERY DAY AS NEEDED  Qty: 30 tablet, Refills: 0    Associated Diagnoses: Common variable immunodeficiency; Recurrent sinusitis; Bronchitis      TRELEGY ELLIPTA 200-62.5-25 mcg inhaler INHALE 1 PUFF INTO THE LUNGS EVERY DAY  Qty: 60 each, Refills: 7    Associated Diagnoses: Moderate persistent asthma without complication; Chronic obstructive pulmonary disease, unspecified COPD type                 Discharge Diagnosis: Lumbar spondylosis [M47.816]  Condition on Discharge: Stable with no complications to procedure   Diet on Discharge: Same as before.  Activity: as per instruction sheet.  Discharge to: Home with a responsible adult.  Follow up: 2-4 weeks       Please call the office at [phone] if you experience any weakness or loss of sensation, fever > 101.5, pain uncontrolled with oral medications, persistent nausea/vomiting/or diarrhea, redness or drainage from the incisions, or any other worrisome concerns. If physician on call was not reached or could not communicate with our office for any reason please go to the nearest emergency department

## 2025-04-23 ENCOUNTER — TELEPHONE (OUTPATIENT)
Dept: PAIN MEDICINE | Facility: CLINIC | Age: 65
End: 2025-04-23
Payer: COMMERCIAL

## 2025-04-23 DIAGNOSIS — M47.816 LUMBAR SPONDYLOSIS: Primary | ICD-10-CM

## 2025-04-23 NOTE — TELEPHONE ENCOUNTER
Reached out to pt to get their percent relief from the injection that they had and to answer the following questions.      Lumbar MBB #2          1. What percentage of pain relief did you receive following the block, from 0-100%?     95%    2. What was pain score the day before your procedure on a scale from 0-10?    6/10    3. What was pain score immediately after your procedure (up to 6 hours)  on a scale from 0-10?    0/10    4. When your pain returned, what was your pain score on a scale from 0-10?  1/10     5. How many hours did pain relief last following the block?      Still in affect        6. During this time, please describe in detail the activities you were able to do?    Pt was able to sleep       Pain Disability Index (PDI) Score Review:      3/4/2025    12:21 PM 11/23/2021     2:51 PM   Last 3 PDI Scores   Pain Disability Index (PDI) 40 25

## 2025-04-27 ENCOUNTER — PATIENT MESSAGE (OUTPATIENT)
Dept: OBSTETRICS AND GYNECOLOGY | Facility: CLINIC | Age: 65
End: 2025-04-27
Payer: COMMERCIAL

## 2025-04-28 ENCOUNTER — TELEPHONE (OUTPATIENT)
Dept: OBSTETRICS AND GYNECOLOGY | Facility: CLINIC | Age: 65
End: 2025-04-28
Payer: COMMERCIAL

## 2025-04-28 ENCOUNTER — OFFICE VISIT (OUTPATIENT)
Dept: OBSTETRICS AND GYNECOLOGY | Facility: CLINIC | Age: 65
End: 2025-04-28
Payer: COMMERCIAL

## 2025-04-28 VITALS
DIASTOLIC BLOOD PRESSURE: 78 MMHG | WEIGHT: 147.69 LBS | BODY MASS INDEX: 24.58 KG/M2 | SYSTOLIC BLOOD PRESSURE: 126 MMHG

## 2025-04-28 DIAGNOSIS — N64.52 BREAST DISCHARGE: Primary | ICD-10-CM

## 2025-04-28 PROCEDURE — 99999 PR PBB SHADOW E&M-EST. PATIENT-LVL IV: CPT | Mod: PBBFAC,,, | Performed by: NURSE PRACTITIONER

## 2025-04-28 PROCEDURE — 99213 OFFICE O/P EST LOW 20 MIN: CPT | Mod: S$GLB,,, | Performed by: NURSE PRACTITIONER

## 2025-04-28 NOTE — PROGRESS NOTES
"María Julian is a 65 y.o. female  presents with complaint of in February noted right breast nipple was "black"  When she squeezed nipple it opened up with thick white drainage  Has since had some drainage from right breast but only noted with squeezing   Mmg in July was negative TC score of 4.21%    No LMP recorded. Patient has had a hysterectomy.    Past Medical History:   Diagnosis Date    Abnormal Pap smear of cervix     Allergy     Asthma     COPD (chronic obstructive pulmonary disease)     Malignant hyperthermia     pt's mother has hx of MH.  Pt denies any complications with general anesthesia    Recurrent upper respiratory infection (URI)      Past Surgical History:   Procedure Laterality Date    BONE SPUR EXCISION SHOULDER Right     CARPAL TUNNEL RELEASE Left      SECTION      x 2    CHOLECYSTECTOMY  2018    DILATION AND CURETTAGE OF UTERUS      EXAMINATION UNDER ANESTHESIA N/A 2021    Procedure: Exam Under Anesthesia;  Surgeon: Don Allen MD;  Location: [facility] OR;  Service: Oncology;  Laterality: N/A;    FOOT TENDON SURGERY Bilateral     HYSTERECTOMY      fibroid    INJECTION OF ANESTHETIC AGENT AROUND MEDIAL BRANCH NERVES INNERVATING LUMBAR FACET JOINT Right 10/28/2021    Procedure: Block-nerve-medial branch-lumbar Right L3, 4,5 MBB with RN IV sedation;  Surgeon: Lei Copeland MD;  Location: [facility] PAIN [facility]GT;  Service: Pain Management;  Laterality: Right;    INJECTION OF ANESTHETIC AGENT AROUND MEDIAL BRANCH NERVES INNERVATING LUMBAR FACET JOINT Bilateral 2021    Procedure: Block-nerve-medial branch-lumbar bilateral L3, 4,5 MBB RN IV sedation;  Surgeon: Lei Copeland MD;  Location: [facility];  Service: Pain Management;  Laterality: Bilateral;    INJECTION OF ANESTHETIC AGENT AROUND MEDIAL BRANCH NERVES INNERVATING LUMBAR FACET JOINT Bilateral 3/14/2022    Procedure: Block-nerve-medial branch-lumbar L3, 4 5 NO STEROIDS RN IV sedation;  Surgeon: Lei Copeland MD;  " Location: [facility]IN [facility]GT;  Service: Pain Management;  Laterality: Bilateral;    INJECTION OF ANESTHETIC AGENT AROUND MEDIAL BRANCH NERVES INNERVATING LUMBAR FACET JOINT Bilateral 3/21/2022    Procedure: Block-nerve-medial branch-lumbar bilateral L3, 4,5 RN IV sedation NO STERIOD DIAGNOSTIC ONLY;  Surgeon: Lei Copeland MD;  Location: [facility];  Service: Pain Management;  Laterality: Bilateral;    INJECTION OF ANESTHETIC AGENT AROUND MEDIAL BRANCH NERVES INNERVATING LUMBAR FACET JOINT Bilateral 3/25/2025    Procedure: Bilateral L3-4, L4-5 MBB #1 with local;  Surgeon: Jamir Carroll MD;  Location: [facility];  Service: Pain Management;  Laterality: Bilateral;    INJECTION OF ANESTHETIC AGENT AROUND MEDIAL BRANCH NERVES INNERVATING LUMBAR FACET JOINT Bilateral 2025    Procedure: Bilateral L3-5 MBB #2 with local;  Surgeon: Jamir Carroll MD;  Location: [facility];  Service: Pain Management;  Laterality: Bilateral;    LASER ABLATION N/A 2021    Procedure: ABLATION, USING LASER;  Surgeon: Don Allen MD;  Location: [facility] OR;  Service: Oncology;  Laterality: N/A;    MICRODISCECTOMY OF SPINE      OOPHORECTOMY Bilateral     RADIOFREQUENCY THERMOCOAGULATION Bilateral 2022    Procedure: RADIOFREQUENCY THERMAL COAGULATION bilateral L3, 4,5 mbb RFA RN IV sedation;  Surgeon: Lei Copeland MD;  Location: [facility];  Service: Pain Management;  Laterality: Bilateral;    TONSILLECTOMY      TRIGGER FINGER RELEASE Left     TUBAL LIGATION Bilateral      Social History[1]  Family History   Problem Relation Name Age of Onset    Malignant hypertension Mother Elis Black     Heart disease Mother Elis Black     Cancer Father Raymond Black         Liver cancer    Diabetes Father Raymond Black     Ovarian cancer Sister Sharda Black     Alcohol abuse Sister Sharda Black     Early death Sister Sharda Black      OB History    Para Term  AB Living   3 2 2  1 2   SAB IAB Ectopic Multiple Live Births              # Outcome Date GA Lbr Jeremías/2nd Weight Sex Type Anes PTL Lv   3 AB            2 Term  35w0d  2.722 kg (6 lb)  CS-Unspec Spinal     1 Term  42w0d  3.685 kg (8 lb 2 oz)  CS-Unspec Spinal         /78   Wt 67 kg (147 lb 11.3 oz)   BMI 24.58 kg/m²     ROS:  Per hpi    PHYSICAL EXAM:  Breasts: normal appearance, no masses or tenderness, No nipple retraction or dimpling, No nipple discharge or bleeding, No axillary or supraclavicular adenopathy   Physical Exam     ASSESSMENT and PLAN:  1. Breast discharge  Mammo Digital Diagnostic Right    US Breast Right Limited          Diagnoses and all orders for this visit:    Breast discharge  -     Mammo Digital Diagnostic Right; Future  -     US Breast Right Limited; Future       Fu with breast surgeon       Answers submitted by the patient for this visit:  Mass Questionnaire (Submitted on 2025)  Chief Complaint: Pulmonary/Chest Tumor/Mass  Chronicity: new  Onset: more than 1 month ago  Frequency: intermittently  Progression since onset: gradually improving  abdominal pain: No  anorexia: No  change in bowel habit: No  chest pain: No  chills: No  congestion: No  cough: No  diaphoresis: No  fatigue: No  fever: No  headaches: No  myalgias: No  nausea: No  neck pain: No  numbness: No  rash: No  swollen glands: No  urinary symptoms: No  vertigo: No  visual change: No  vomiting: No  weakness: No  Aggravated by: nothing  treatments tried: nothing  Improvement on treatment: moderate         [1]   Social History  Tobacco Use    Smoking status: Former     Current packs/day: 0.00     Average packs/day: 1 pack/day for 43.7 years (43.7 ttl pk-yrs)     Types: Cigarettes     Start date: 1975     Quit date: 2018     Years since quittin.6    Smokeless tobacco: Never   Substance Use Topics    Alcohol use: Yes     Alcohol/week: 7.0 standard drinks of alcohol     Types: 7 Drinks containing 0.5 oz of alcohol per week     Comment: socially  No alcohol 72h prior to sx     Drug use: Never

## 2025-04-28 NOTE — TELEPHONE ENCOUNTER
Two pt identifiers identified. Pt stated she was having discharge from her nipple area. Informed pt that there was an 11:30 appointment available today, with Gloria, at the [facility], which is in twenty minutes. Pt stated she wanted the appointment and that she would be there in ten minutes.

## 2025-05-05 ENCOUNTER — HOSPITAL ENCOUNTER (OUTPATIENT)
Dept: RADIOLOGY | Facility: HOSPITAL | Age: 65
Discharge: HOME | End: 2025-05-05
Attending: NURSE PRACTITIONER
Payer: COMMERCIAL

## 2025-05-05 DIAGNOSIS — N64.52 BREAST DISCHARGE: ICD-10-CM

## 2025-05-05 PROCEDURE — 76642 ULTRASOUND BREAST LIMITED: CPT | Mod: TC,RT

## 2025-05-05 PROCEDURE — 76642 ULTRASOUND BREAST LIMITED: CPT | Mod: 26,RT,, | Performed by: STUDENT IN AN ORGANIZED HEALTH CARE EDUCATION/TRAINING PROGRAM

## 2025-05-05 PROCEDURE — 77062 BREAST TOMOSYNTHESIS BI: CPT | Mod: 26,,, | Performed by: STUDENT IN AN ORGANIZED HEALTH CARE EDUCATION/TRAINING PROGRAM

## 2025-05-05 PROCEDURE — 77066 DX MAMMO INCL CAD BI: CPT | Mod: 26,,, | Performed by: STUDENT IN AN ORGANIZED HEALTH CARE EDUCATION/TRAINING PROGRAM

## 2025-05-05 PROCEDURE — 77062 BREAST TOMOSYNTHESIS BI: CPT | Mod: TC

## 2025-05-06 ENCOUNTER — RESULTS FOLLOW-UP (OUTPATIENT)
Dept: OBSTETRICS AND GYNECOLOGY | Facility: CLINIC | Age: 65
End: 2025-05-06

## 2025-05-13 NOTE — PRE-PROCEDURE INSTRUCTIONS
Spoke with patient regarding procedure scheduled on 5.15     Arrival time 0800     Has patient been sick with fever or on antibiotics within the last 7 days? No     Does the patient have any open wounds, sores or rashes? No     Does the patient have any recent fractures? no     Has patient received a vaccination within the last 7 days? No     Received the COVID vaccination?     Has the patient stopped all medications as directed? na     Does patient have a pacemaker, defibrillator, or implantable stimulator? NONE     Does the patient have a ride to and from procedure and someone reliable to remain with patient?       Is the patient diabetic? yes     Does the patient have sleep apnea? Or use O2 at home? no     Is the patient receiving sedation?      Is the patient instructed to remain NPO beginning at midnight the night before their procedure? yes     Procedure location confirmed with patient? Yes     Covid- Denies signs/symptoms. Instructed to notify PAT/MD if any changes.

## 2025-05-20 ENCOUNTER — HOSPITAL ENCOUNTER (OUTPATIENT)
Facility: HOSPITAL | Age: 65
Discharge: HOME | End: 2025-05-20
Attending: ANESTHESIOLOGY | Admitting: ANESTHESIOLOGY
Payer: COMMERCIAL

## 2025-05-20 VITALS
HEART RATE: 62 BPM | RESPIRATION RATE: 18 BRPM | OXYGEN SATURATION: 97 % | SYSTOLIC BLOOD PRESSURE: 126 MMHG | TEMPERATURE: 97 F | BODY MASS INDEX: 24 KG/M2 | DIASTOLIC BLOOD PRESSURE: 73 MMHG | WEIGHT: 144.06 LBS | HEIGHT: 65 IN

## 2025-05-20 DIAGNOSIS — M47.816 LUMBAR SPONDYLOSIS: ICD-10-CM

## 2025-05-20 LAB — POCT GLUCOSE: 118 MG/DL (ref 70–110)

## 2025-05-20 PROCEDURE — 64636 DESTROY L/S FACET JNT ADDL: CPT | Mod: 50 | Performed by: ANESTHESIOLOGY

## 2025-05-20 PROCEDURE — 64635 DESTROY LUMB/SAC FACET JNT: CPT | Mod: 50 | Performed by: ANESTHESIOLOGY

## 2025-05-20 PROCEDURE — 82962 GLUCOSE BLOOD TEST: CPT | Performed by: ANESTHESIOLOGY

## 2025-05-20 PROCEDURE — 64635 DESTROY LUMB/SAC FACET JNT: CPT | Mod: 50,,, | Performed by: ANESTHESIOLOGY

## 2025-05-20 PROCEDURE — 63600175 PHARM REV CODE 636 W HCPCS: Mod: JZ,TB | Performed by: ANESTHESIOLOGY

## 2025-05-20 PROCEDURE — 64636 DESTROY L/S FACET JNT ADDL: CPT | Mod: 50,,, | Performed by: ANESTHESIOLOGY

## 2025-05-20 PROCEDURE — 99152 MOD SED SAME PHYS/QHP 5/>YRS: CPT | Performed by: ANESTHESIOLOGY

## 2025-05-20 PROCEDURE — 25000003 PHARM REV CODE 250: Performed by: ANESTHESIOLOGY

## 2025-05-20 RX ORDER — METHYLPREDNISOLONE ACETATE 40 MG/ML
INJECTION, SUSPENSION INTRA-ARTICULAR; INTRALESIONAL; INTRAMUSCULAR; SOFT TISSUE
Status: DISCONTINUED | OUTPATIENT
Start: 2025-05-20 | End: 2025-05-20 | Stop reason: HOSPADM

## 2025-05-20 RX ORDER — FENTANYL CITRATE 50 UG/ML
INJECTION, SOLUTION INTRAMUSCULAR; INTRAVENOUS
Status: DISCONTINUED | OUTPATIENT
Start: 2025-05-20 | End: 2025-05-20 | Stop reason: HOSPADM

## 2025-05-20 RX ORDER — MIDAZOLAM HYDROCHLORIDE 1 MG/ML
INJECTION, SOLUTION INTRAMUSCULAR; INTRAVENOUS
Status: DISCONTINUED | OUTPATIENT
Start: 2025-05-20 | End: 2025-05-20 | Stop reason: HOSPADM

## 2025-05-20 RX ORDER — LIDOCAINE HYDROCHLORIDE 20 MG/ML
INJECTION, SOLUTION EPIDURAL; INFILTRATION; INTRACAUDAL; PERINEURAL
Status: DISCONTINUED | OUTPATIENT
Start: 2025-05-20 | End: 2025-05-20 | Stop reason: HOSPADM

## 2025-05-20 RX ORDER — SODIUM BICARBONATE 1 MEQ/ML
SYRINGE (ML) INTRAVENOUS
Status: DISCONTINUED | OUTPATIENT
Start: 2025-05-20 | End: 2025-05-20 | Stop reason: HOSPADM

## 2025-05-20 RX ORDER — BUPIVACAINE HYDROCHLORIDE 2.5 MG/ML
INJECTION, SOLUTION EPIDURAL; INFILTRATION; INTRACAUDAL; PERINEURAL
Status: DISCONTINUED | OUTPATIENT
Start: 2025-05-20 | End: 2025-05-20 | Stop reason: HOSPADM

## 2025-05-20 NOTE — DISCHARGE SUMMARY
Discharge Note  Short Stay      SUMMARY     Admit Date: 5/20/2025    Attending Physician: Jamir Carroll MD        Discharge Physician: Jamir Carroll MD        Discharge Date: 5/20/2025 10:17 AM    Procedure(s) (LRB):  Bilateral L4-5 and L5-S1 RFA (Bilateral)    Final Diagnosis: Lumbar spondylosis [M47.816]    Disposition: Home or self care    Patient Instructions:   Discharge Medication List as of 5/20/2025  8:00 AM        CONTINUE these medications which have NOT CHANGED    Details   albuterol (PROVENTIL/VENTOLIN HFA) 90 mcg/actuation inhaler INHALE 2 PUFFS INTO THE LUNGS EVERY 6 HOURS AS NEEDED. RESCUE, Normal      amLODIPine (NORVASC) 5 MG tablet Take 1 tablet (5 mg total) by mouth once daily., Starting Mon 2/24/2025, Normal      aspirin 81 MG Chew Take 81 mg by mouth once daily., Historical Med      diphenhydrAMINE (BENADRYL) 25 mg capsule Take 25 mg by mouth. Before infusions, Historical Med      ezetimibe (ZETIA) 10 mg tablet Take 1 tablet (10 mg total) by mouth once daily., Starting Mon 2/24/2025, Normal      gabapentin (NEURONTIN) 300 MG capsule Take 300 mg by mouth every evening. , Historical Med      hydroCHLOROthiazide 12.5 MG Tab Take 1 tablet (12.5 mg total) by mouth once daily., Starting Mon 2/24/2025, Normal      Immune Globulin G, IGG,-PRO-IGA 10 % injection, Privigen, (PRIVIGEN) 10 % Soln Inject 300 mLs (30 g total) into the vein every 28 days., Starting Mon 11/30/2020, Print      metFORMIN (GLUCOPHAGE) 500 MG tablet TAKE 1 TABLET(500 MG) BY MOUTH DAILY WITH BREAKFAST, Starting Thu 1/9/2025, Normal      nebulizer and compressor Beatrice Use to deliver nebulizer medications up to 6 times a day, Normal      predniSONE (DELTASONE) 10 MG tablet TAKE 1 TABLET BY MOUTH EVERY DAY AS NEEDED, Normal      TRELEGY ELLIPTA 200-62.5-25 mcg inhaler INHALE 1 PUFF INTO THE LUNGS EVERY DAY, Normal                 Discharge Diagnosis: Lumbar spondylosis [M47.816]  Condition on Discharge: Stable with no complications to  procedure   Diet on Discharge: Same as before.  Activity: as per instruction sheet.  Discharge to: Home with a responsible adult.  Follow up: 2-4 weeks       Please call the office at [phone] if you experience any weakness or loss of sensation, fever > 101.5, pain uncontrolled with oral medications, persistent nausea/vomiting/or diarrhea, redness or drainage from the incisions, or any other worrisome concerns. If physician on call was not reached or could not communicate with our office for any reason please go to the nearest emergency department

## 2025-05-20 NOTE — DISCHARGE INSTRUCTIONS

## 2025-05-20 NOTE — OP NOTE
María Wilhelm [place]  65 y.o. female      Vitals:    05/20/25 0915   BP: 126/73   Pulse: 62   Resp: 18   Temp:         INFORMED CONSENT: The procedure, risks, benefits and options were discussed with patient. There are no contraindications to the procedure. The patient expressed understanding and agreed to proceed. The personnel performing the procedure was discussed. I verify that I personally obtained the patient's consent prior to the start of the procedure and the signed consent can be found on the patient's chart.     Procedure Date:05/20/2025       Pre Procedure diagnosis: Lumbar spondylosis [M47.816]  Post-Procedure diagnosis:         Sedation:  Conscious sedation provided by M.D    The patient was monitored with continuous pulse oximetry, EKG, and intermittent blood pressure monitors.  The patient was hemodynamically stable throughout the entire process was responsive to voice, and breathing spontaneously.  Supplemental O2 was provided at 2L/min via nasal cannula.  Patient was comfortable for the duration of the procedure. (See nurse documentation and case log for sedation time)    There was a total of 2mg IV Midazolam and 100mcg Fentanyl titrated for the procedure        Conscious sedation ordered by MD.  Patient reevaluated and sedation administered by MD and monitored by RN.  Total sedation time was lmore than 15 min. (See nurse documentation and case log for sedation time)      PROCEDURE: bilateral L3,4,5  FACET MEDIAL BRANCH NERVE RADIOFREQUENCY NEUROTOMY (lumbar)         DESCRIPTION OF PROCEDURE: The patient was brought to the procedure room.  After performing time out IV access was obtained prior to the procedure. The patient was positioned prone on the fluoroscopy table. Continuous hemodynamic monitoring was initiated including blood pressure and pulse oximetry. IV sedation was administered incrementally to allow the patient to remain comfortable and conversant throughout the procedure. The area  of the lumbar spine was prepped chlorhexidine three times and draped into a sterile field.  Fluoroscopy was used to identify the location of the CYNDEE side  L3, L4, and L5 medial branch nerves at the junctions of the superior articular process and the transverse processes of  L4, L5, and the sacral ala respectively.  Skin anesthesia was achieved using 3 cc of Lidocaine 1% over the injection sites. A 20 gauge, 100mm (10mm active tip) curved RF needle was slowly inserted at each level using AP, lateral and oblique fluoroscopic imaging. Negative aspiration for blood or CSF was confirmed.  Sensory stimulation at 50Hz below 0.5V was achieved at every level. Motor stimulation at 2Hz up to 1.5V did not cause any radicular symptoms at any level. Each level was anesthetized with 1.5 cc of lidocaine 1%.  Radiofrequency lesioning was performed for 90 seconds at 80 degrees in two different positions at each level.  Total of 3 cc of bupivacaine 0.25% and 10 mg of Decadron was injected was injected at all levels.. The needles were removed and bleeding was nil.  A sterile dressing was applied. Patient was taken back to the recovery room for further observation.      Stimulation Results:     L3 = Sensory positive @ 0.5, Motor negative @ 1.5  L4 = Sensory positive @ 0.7, Motor negative @ 1.5  L5 = Sensory positive @ 0.5, Motor negative @ 1.5     Blood Loss: Nill  Specimen: None

## 2025-05-21 DIAGNOSIS — R73.03 PREDIABETES: ICD-10-CM

## 2025-05-21 DIAGNOSIS — I10 ESSENTIAL HYPERTENSION: ICD-10-CM

## 2025-06-03 ENCOUNTER — LAB VISIT (OUTPATIENT)
Dept: LAB | Facility: HOSPITAL | Age: 65
End: 2025-06-03
Attending: FAMILY MEDICINE
Payer: COMMERCIAL

## 2025-06-03 ENCOUNTER — OFFICE VISIT (OUTPATIENT)
Dept: INTERNAL MEDICINE | Facility: CLINIC | Age: 65
End: 2025-06-03
Payer: COMMERCIAL

## 2025-06-03 VITALS
OXYGEN SATURATION: 97 % | DIASTOLIC BLOOD PRESSURE: 74 MMHG | HEART RATE: 96 BPM | BODY MASS INDEX: 23.66 KG/M2 | WEIGHT: 142 LBS | SYSTOLIC BLOOD PRESSURE: 118 MMHG | TEMPERATURE: 98 F | HEIGHT: 65 IN

## 2025-06-03 DIAGNOSIS — R73.03 PREDIABETES: ICD-10-CM

## 2025-06-03 DIAGNOSIS — Z12.2 SCREENING FOR LUNG CANCER: ICD-10-CM

## 2025-06-03 DIAGNOSIS — Z13.820 SCREENING FOR OSTEOPOROSIS: ICD-10-CM

## 2025-06-03 DIAGNOSIS — Z00.00 ANNUAL PHYSICAL EXAM: Primary | ICD-10-CM

## 2025-06-03 DIAGNOSIS — J45.40 MODERATE PERSISTENT ASTHMA WITHOUT COMPLICATION: ICD-10-CM

## 2025-06-03 DIAGNOSIS — Z53.20 REFUSAL OF STATIN MEDICATION BY PATIENT: ICD-10-CM

## 2025-06-03 DIAGNOSIS — E83.119 HEMOCHROMATOSIS, UNSPECIFIED HEMOCHROMATOSIS TYPE: ICD-10-CM

## 2025-06-03 DIAGNOSIS — I25.10 CORONARY ARTERY DISEASE INVOLVING NATIVE CORONARY ARTERY OF NATIVE HEART WITHOUT ANGINA PECTORIS: ICD-10-CM

## 2025-06-03 DIAGNOSIS — Z00.00 ANNUAL PHYSICAL EXAM: ICD-10-CM

## 2025-06-03 DIAGNOSIS — D83.9 COMMON VARIABLE IMMUNODEFICIENCY: ICD-10-CM

## 2025-06-03 DIAGNOSIS — R91.1 SOLITARY PULMONARY NODULE: ICD-10-CM

## 2025-06-03 DIAGNOSIS — J44.89 ASTHMA-COPD OVERLAP SYNDROME: ICD-10-CM

## 2025-06-03 DIAGNOSIS — J41.0 SIMPLE CHRONIC BRONCHITIS: ICD-10-CM

## 2025-06-03 DIAGNOSIS — I70.0 ABDOMINAL AORTIC ATHEROSCLEROSIS: ICD-10-CM

## 2025-06-03 DIAGNOSIS — R91.8 ABNORMAL CT LUNG SCREENING: ICD-10-CM

## 2025-06-03 DIAGNOSIS — I10 PRIMARY HYPERTENSION: ICD-10-CM

## 2025-06-03 DIAGNOSIS — Z87.891 FORMER SMOKER: ICD-10-CM

## 2025-06-03 DIAGNOSIS — M47.816 LUMBAR SPONDYLOSIS: ICD-10-CM

## 2025-06-03 PROBLEM — R53.81 MALAISE AND FATIGUE: Status: RESOLVED | Noted: 2022-07-28 | Resolved: 2025-06-03

## 2025-06-03 PROBLEM — J40 BRONCHITIS: Status: RESOLVED | Noted: 2024-05-07 | Resolved: 2025-06-03

## 2025-06-03 PROBLEM — R53.83 MALAISE AND FATIGUE: Status: RESOLVED | Noted: 2022-07-28 | Resolved: 2025-06-03

## 2025-06-03 PROBLEM — J32.9 RECURRENT SINUSITIS: Status: RESOLVED | Noted: 2024-05-07 | Resolved: 2025-06-03

## 2025-06-03 LAB
ALBUMIN SERPL BCP-MCNC: 4.2 G/DL (ref 3.5–5.2)
ALP SERPL-CCNC: 82 UNIT/L (ref 40–150)
ALT SERPL W/O P-5'-P-CCNC: 12 UNIT/L (ref 10–44)
ANION GAP (OHS): 13 MMOL/L (ref 8–16)
AST SERPL-CCNC: 16 UNIT/L (ref 11–45)
BILIRUB SERPL-MCNC: 1 MG/DL (ref 0.1–1)
BUN SERPL-MCNC: 14 MG/DL (ref 8–23)
CALCIUM SERPL-MCNC: 9.4 MG/DL (ref 8.7–10.5)
CHLORIDE SERPL-SCNC: 99 MMOL/L (ref 95–110)
CHOLEST SERPL-MCNC: 149 MG/DL (ref 120–199)
CHOLEST/HDLC SERPL: 2.2 {RATIO} (ref 2–5)
CO2 SERPL-SCNC: 22 MMOL/L (ref 23–29)
CREAT SERPL-MCNC: 1 MG/DL (ref 0.5–1.4)
EAG (OHS): 88 MG/DL (ref 68–131)
ERYTHROCYTE [DISTWIDTH] IN BLOOD BY AUTOMATED COUNT: 11.9 % (ref 11.5–14.5)
FERRITIN SERPL-MCNC: 130 NG/ML (ref 20–300)
GFR SERPLBLD CREATININE-BSD FMLA CKD-EPI: >60 ML/MIN/1.73/M2
GLUCOSE SERPL-MCNC: 79 MG/DL (ref 70–110)
HBA1C MFR BLD: 4.7 % (ref 4–5.6)
HCT VFR BLD AUTO: 42 % (ref 37–48.5)
HDLC SERPL-MCNC: 67 MG/DL (ref 40–75)
HDLC SERPL: 45 % (ref 20–50)
HGB BLD-MCNC: 14.8 GM/DL (ref 12–16)
IRON SATN MFR SERPL: 12 % (ref 20–50)
IRON SERPL-MCNC: 46 UG/DL (ref 30–160)
LDLC SERPL CALC-MCNC: 60.4 MG/DL (ref 63–159)
MCH RBC QN AUTO: 35.2 PG (ref 27–31)
MCHC RBC AUTO-ENTMCNC: 35.2 G/DL (ref 32–36)
MCV RBC AUTO: 100 FL (ref 82–98)
NONHDLC SERPL-MCNC: 82 MG/DL
PLATELET # BLD AUTO: 174 K/UL (ref 150–450)
PMV BLD AUTO: 10.6 FL (ref 9.2–12.9)
POTASSIUM SERPL-SCNC: 2.9 MMOL/L (ref 3.5–5.1)
PROT SERPL-MCNC: 7.2 GM/DL (ref 6–8.4)
RBC # BLD AUTO: 4.21 M/UL (ref 4–5.4)
SODIUM SERPL-SCNC: 134 MMOL/L (ref 136–145)
TIBC SERPL-MCNC: 370 UG/DL (ref 250–450)
TRANSFERRIN SERPL-MCNC: 250 MG/DL (ref 200–375)
TRIGL SERPL-MCNC: 108 MG/DL (ref 30–150)
TSH SERPL-ACNC: 1.7 UIU/ML (ref 0.4–4)
WBC # BLD AUTO: 10.75 K/UL (ref 3.9–12.7)

## 2025-06-03 PROCEDURE — 83036 HEMOGLOBIN GLYCOSYLATED A1C: CPT

## 2025-06-03 PROCEDURE — 80061 LIPID PANEL: CPT

## 2025-06-03 PROCEDURE — 85027 COMPLETE CBC AUTOMATED: CPT

## 2025-06-03 PROCEDURE — 84443 ASSAY THYROID STIM HORMONE: CPT

## 2025-06-03 PROCEDURE — 99397 PER PM REEVAL EST PAT 65+ YR: CPT | Mod: S$GLB,,, | Performed by: FAMILY MEDICINE

## 2025-06-03 PROCEDURE — 82728 ASSAY OF FERRITIN: CPT

## 2025-06-03 PROCEDURE — 99999 PR PBB SHADOW E&M-EST. PATIENT-LVL IV: CPT | Mod: PBBFAC,,, | Performed by: FAMILY MEDICINE

## 2025-06-03 PROCEDURE — 83540 ASSAY OF IRON: CPT

## 2025-06-03 PROCEDURE — 80053 COMPREHEN METABOLIC PANEL: CPT

## 2025-06-03 PROCEDURE — 36415 COLL VENOUS BLD VENIPUNCTURE: CPT

## 2025-06-06 ENCOUNTER — RESULTS FOLLOW-UP (OUTPATIENT)
Dept: INTERNAL MEDICINE | Facility: CLINIC | Age: 65
End: 2025-06-06

## 2025-06-06 DIAGNOSIS — E87.6 LOW SERUM POTASSIUM LEVEL: Primary | ICD-10-CM

## 2025-06-06 DIAGNOSIS — R91.1 SOLITARY PULMONARY NODULE: ICD-10-CM

## 2025-06-06 NOTE — PROGRESS NOTES
Established Patient - TeleHealth Visit    The patient location is: [place]  The chief complaint leading to consultation is: chronic pain     Visit type: audiovisual    Encounter includes face to face time and non-face to face time preparing to see the patient (eg, review of tests), Obtaining and/or reviewing separately obtained history, Documenting clinical information in the electronic or other health record, Independently interpreting results (not separately reported) and communicating results to the patient/family/caregiver, or Care coordination (not separately reported).     Each patient to whom he or she provides medical services by telemedicine is:  (1) informed of the relationship between the physician and patient and the respective role of any other health care provider with respect to management of the patient; and (2) notified that he or she may decline to receive medical services by telemedicine and may withdraw from such care at any time.    Interventional Pain Note     Referring Physician: No ref. provider found    PCP: David Ochoa MD    Chief Complaint:   No chief complaint on file.       SUBJECTIVE:  Interval History (6/11/2025):  Patient María Julian presents today for follow-up visit.  Patient was last seen on 5/20/2025 bilateral L4/5 + L5/S1 RFA with 90% relief. She is also here to have MRI reviewed. She rates her pain 2/10 and states she is doing much better. No longer having radiculopathy.   Patient denies night fever/night sweats, urinary incontinence, bowel incontinence, significant weight loss and significant motor weakness.   Patient denies any other complaints or concerns at this time.        3/4/2025  María Julian is a 65 y.o. female with past medical history significant for recurrent sinusitis, COPD/bronchitis, coronary artery disease, common variable immunodeficiency, prediabetes, hemochromatosis who presents to the clinic for the evaluation of lower back, leg and  "neck pain.  Of note patient reports history of prior microdiskectomy at [facility] in [place] which temporarily gave her improvement in lower back and leg pain.  Since then she has had lumbar L3-5 radiofrequency ablation [facility] and Dr. Copeland, internally.  She reports at least 2 years of greater than 80% relief following her most recent nerve burn.  Today she reports pain which is constant which is rated a 6/10.  Pain is described as stabbing, throbbing in nature as well as numbness and tingling in the right foot.  She reports pain in a bandlike distribution in the lower back.  Pain can also radiate down the lateral aspects of bilateral lower extremities in L4-S1 distribution to the feet.  Majority, 90% of pain remains axial in the lower back.  Pain can be exacerbated with prolonged sitting or standing exceeding 10-15 minutes in duration.  She does report weakness in the lower extremities as well as issues with balance.  Pain is improved with stretching.  She has performed physician directed physical therapy exercises for lower back and leg pain over the last 8 weeks daily from January 4, 2025 through March 4, 2025 with marginal improvement in her symptoms.  Patient is interested in repeating lumbar radiofrequency ablation.    She also reports bilateral cervical paraspinous pain, worse on the left side.  She reports grinding in the left neck.  Pain can be exacerbated with cervical lateral rotation and flexion at/extension.  She denies more distal radiculopathy into the upper extremities or hands, weakness in the upper extremities or myelopathic signs such as compromise in hand  strength or dexterity.    Patient has not had recent lumbar MRI.  She does report colonoscopy and which "clips" were placed.  Patient has a identification card with her today stating that these are MRI conditional.    Patient reports significant motor weakness and loss of sensations.  Patient " denies night fever/night sweats, urinary incontinence, bowel incontinence, and significant weight loss.      Pain Disability Index Review:         3/4/2025    12:21 PM 2021     2:51 PM   Last 3 PDI Scores   Pain Disability Index (PDI) 40 25       Non-Pharmacologic Treatments:  Physical Therapy/Home Exercise: yes  Ice/Heat:no  TENS: no  Acupuncture: no  Massage: no  Chiropractic: no    Other: yes; microdiskectomy [facility]      Pain Medications:  - Adjuvant Medications: Neurontin (Gabapentin) and Prednisone (Deltasone)  - Anti-Coagulants: Aspirin    Pain Procedures:   Dr. Copeland:  -2022: Bilateral L3-5 lumbar radiofrequency ablation  -2022: Bilateral L3-5 diagnostic lumbar medial branch block  -2022:  Bilateral L3-5 diagnostic lumbar medial branch block  -2021: Bilateral L3-5 lumbar medial branch block  -10/28/2021:  Right-sided L3-5 lumbar medial branch block    Dr. Clay Cordoba Carroll:  2025 bilateral L4/5 + L5/S1 RFA with 90% relief.    Past Medical History:   Diagnosis Date    Abnormal Pap smear of cervix     Allergy     Asthma     COPD (chronic obstructive pulmonary disease)     Genital warts 2021    Malignant hyperthermia     pt's mother has hx of MH.  Pt denies any complications with general anesthesia    Recurrent upper respiratory infection (URI)      Past Surgical History:   Procedure Laterality Date    ABDOMINAL SURGERY  2018    Gallbladder    BONE SPUR EXCISION SHOULDER Right     CARPAL TUNNEL RELEASE Left      SECTION      x 2    CHOLECYSTECTOMY  2018    COLPOSCOPY  2020    DILATION AND CURETTAGE OF UTERUS      EXAMINATION UNDER ANESTHESIA N/A 2021    Procedure: Exam Under Anesthesia;  Surgeon: Don Allen MD;  Location: [facility];  Service: Oncology;  Laterality: N/A;    EYE SURGERY  2022    FOOT TENDON SURGERY Bilateral     GYNECOLOGIC CRYOSURGERY      HYSTERECTOMY  1999    fibroid    INJECTION OF ANESTHETIC AGENT AROUND MEDIAL  BRANCH NERVES INNERVATING LUMBAR FACET JOINT Right 10/28/2021    Procedure: Block-nerve-medial branch-lumbar Right L3, 4,5 MBB with RN IV sedation;  Surgeon: Lei Copeland MD;  Location: [facility];  Service: Pain Management;  Laterality: Right;    INJECTION OF ANESTHETIC AGENT AROUND MEDIAL BRANCH NERVES INNERVATING LUMBAR FACET JOINT Bilateral 12/30/2021    Procedure: Block-nerve-medial branch-lumbar bilateral L3, 4,5 MBB RN IV sedation;  Surgeon: Lei Copeland MD;  Location: [facility];  Service: Pain Management;  Laterality: Bilateral;    INJECTION OF ANESTHETIC AGENT AROUND MEDIAL BRANCH NERVES INNERVATING LUMBAR FACET JOINT Bilateral 03/14/2022    Procedure: Block-nerve-medial branch-lumbar L3, 4 5 NO STEROIDS RN IV sedation;  Surgeon: Lei Copeland MD;  Location: [facility];  Service: Pain Management;  Laterality: Bilateral;    INJECTION OF ANESTHETIC AGENT AROUND MEDIAL BRANCH NERVES INNERVATING LUMBAR FACET JOINT Bilateral 03/21/2022    Procedure: Block-nerve-medial branch-lumbar bilateral L3, 4,5 RN IV sedation NO STERIOD DIAGNOSTIC ONLY;  Surgeon: Lei Copeland MD;  Location: [facility];  Service: Pain Management;  Laterality: Bilateral;    INJECTION OF ANESTHETIC AGENT AROUND MEDIAL BRANCH NERVES INNERVATING LUMBAR FACET JOINT Bilateral 03/25/2025    Procedure: Bilateral L3-4, L4-5 MBB #1 with local;  Surgeon: Jamir Carroll MD;  Location: [facility];  Service: Pain Management;  Laterality: Bilateral;    INJECTION OF ANESTHETIC AGENT AROUND MEDIAL BRANCH NERVES INNERVATING LUMBAR FACET JOINT Bilateral 04/22/2025    Procedure: Bilateral L3-5 MBB #2 with local;  Surgeon: Jamir Carroll MD;  Location: [facility];  Service: Pain Management;  Laterality: Bilateral;    LASER ABLATION N/A 06/16/2021    Procedure: ABLATION, USING LASER;  Surgeon: Don Allen MD;  [facility];  Service: Oncology;  Laterality: N/A;    MICRODISCECTOMY OF SPINE      OOPHORECTOMY Bilateral 1999     RADIOFREQUENCY ABLATION Bilateral 05/20/2025    Procedure: Bilateral L4-5 and L5-S1 RFA;  Surgeon: Jamir Carroll MD;  Location: [facility];  Service: Pain Management;  Laterality: Bilateral;    RADIOFREQUENCY THERMOCOAGULATION Bilateral 04/07/2022    Procedure: RADIOFREQUENCY THERMAL COAGULATION bilateral L3, 4,5 mbb RFA RN IV sedation;  Surgeon: Lei Copeland MD;  Location: [facility];  Service: Pain Management;  Laterality: Bilateral;    SPINE SURGERY  2000    TONSILLECTOMY      TRIGGER FINGER RELEASE Left     TUBAL LIGATION Bilateral 1982     Review of patient's allergies indicates:   Allergen Reactions    Erythromycin Anaphylaxis    Macrolide antibiotics Anaphylaxis    Iodine and iodide containing products Hives    Levofloxacin      Pains in her tendons/joints       Current Outpatient Medications   Medication Sig    amLODIPine (NORVASC) 5 MG tablet Take 1 tablet (5 mg total) by mouth once daily.    aspirin 81 MG Chew Take 81 mg by mouth once daily.    diphenhydrAMINE (BENADRYL) 25 mg capsule Take 25 mg by mouth. Before infusions    ezetimibe (ZETIA) 10 mg tablet Take 1 tablet (10 mg total) by mouth once daily.    gabapentin (NEURONTIN) 300 MG capsule Take 300 mg by mouth every evening.     hydroCHLOROthiazide 12.5 MG Tab Take 1 tablet (12.5 mg total) by mouth once daily.    Immune Globulin G, IGG,-PRO-IGA 10 % injection, Privigen, (PRIVIGEN) 10 % Soln Inject 300 mLs (30 g total) into the vein every 28 days.    metFORMIN (GLUCOPHAGE) 500 MG tablet TAKE 1 TABLET(500 MG) BY MOUTH DAILY WITH BREAKFAST    TRELEGY ELLIPTA 200-62.5-25 mcg inhaler INHALE 1 PUFF INTO THE LUNGS EVERY DAY     No current facility-administered medications for this visit.         ROS:  GENERAL:  No weight loss, malaise or fevers.  HEENT:   No recent changes in vision or hearing  NECK:  Negative for lumps, no difficulty with swallowing.  RESPIRATORY:  Negative for cough, wheezing or shortness of breath, patient denies any recent  URI.  CARDIOVASCULAR:  Negative for chest pain or palpitations.  GI:  Negative for abdominal discomfort, blood in stools or black stools or change in bowel habits.  MUSCULOSKELETAL:  See HPI.  SKIN:  Negative for lesions, rash, and itching.  PSYCH:  No mood disorder or recent psychosocial stressors.   HEMATOLOGY/LYMPHOLOGY:  Negative for prolonged bleeding, bruising easily or swollen nodes.    NEURO:   No history of syncope, paralysis, seizures or tremors.  All other reviewed and negative other than HPI.    Telemedicine Exam  There were no vitals filed for this visit.  There is no height or weight on file to calculate BMI.      Physical Exam: last in clinic visit:    OBJECTIVE:    There were no vitals taken for this visit.      Physical Exam:    GENERAL: Well appearing, in no acute distress, alert and oriented x3.  PSYCH:  Mood and affect appropriate.  SKIN: Skin color, texture, turgor normal, no rashes or lesions.  HEAD/FACE:  Normocephalic, atraumatic. Cranial nerves grossly intact.    CV: RRR with palpation of the radial artery.  PULM: No evidence of respiratory difficulty, symmetric chest rise.  GI:  Soft and non-tender.    BACK: Straight leg raising in the sitting and supine positions is negative to radicular pain. pain to palpation over the facet joints of the lumbar spine or spinous processes. Normal range of motion without pain reproduction.  EXTREMITIES: Peripheral joint ROM is full and pain free without obvious instability or laxity in all four extremities. No deformities, edema, or skin discoloration. Good capillary refill.  MUSCULOSKELETAL: Able to stand on heels & toes.   Shoulder, hip, and knee provocative maneuvers are negative.  There is no pain with palpation over the sacroiliac joints bilaterally.  Gaenslen's, Distraction/Compression and  FABERs test is negative.  Facet loading test is positive bilaterally.   Bilateral upper and lower extremity strength is normal and symmetric.  No atrophy or tone  abnormalities are noted.    RIGHT Lower extremity: Hip flexion 5/5, Hip Abduction 5/5, Hip Adduction 5/5, Knee extension 5/5, Knee flexion 5/5, Ankle dorsiflexion5/5, Extensor hallucis longus 5/5, Ankle plantarflexion 5/5  LEFT Lower extremity:  Hip flexion 5/5, Hip Abduction 5/5,Hip Adduction 5/5, Knee extension 5/5, Knee flexion 5/5, Ankle dorsiflexion 5/5, Extensor hallucis longus 5/5, Ankle plantarflexion 5/5  -Normal testing knee (patellar) jerk and ankle (achilles) jerk    NEURO: Bilateral upper and lower extremity coordination and muscle stretch reflexes are physiologic and symmetric. No loss of sensation is noted.  GAIT: normal.    Imaging:   3/11/2025 lumbar MRI  FINDINGS:  Alignment is unremarkable. No evidence of aggressive osseous lesion or acute compression deformity.  Conus medullaris tip is at L1-2. The vertebral bodies demonstrate normal height and signal intensity. Mild disc height loss at L5-S1.     T12-L1: No significant thecal sac or foraminal stenosis.     L1-2: No significant thecal sac or foraminal stenosis.  Mild bilateral facet arthropathy.     L2-3: Trace disc bulge with no significant spinal canal stenosis or neuroforaminal narrowing.  Moderate bilateral facet arthropathy and ligamentum flavum thickening..     L3-4: No significant thecal sac or foraminal stenosis.  Moderate bilateral facet arthropathy and ligamentum flavum thickening.     L4-5: Changes of right hemilaminotomy.  Small concentric disc bulge flattens the ventral thecal sac.  Mild spinal canal stenosis.  Mild to moderate bilateral neuroforaminal narrowing.  Moderate bilateral facet arthropathy and ligamentum flavum thickening.     L5-S1: No significant thecal sac or foraminal stenosis.  Mild to moderate bilateral neuroforaminal narrowing.  Moderate bilateral facet arthropathy and ligamentum flavum thickening.     Paraspinal soft tissues are unremarkable.        Impression:     1.    Multilevel mild to moderate  degenerative/discogenic changes of the lumbar spine, most pronounced at L4-5 where there is mild spinal canal stenosis and mild to moderate bilateral neuroforaminal narrowing.  2.    Additional varying degrees of bilateral neuroforaminal narrowing and facet arthropathy as described in the level by level details above.    3/4/2025 xray cervical  FINDINGS:  Vertebral body heights maintained.  Trace anterolisthesis of C3 on C4 and C4 on C5 which reduces on extension and remains similar on flexion.  Trace retrolisthesis C5 on C6 which reduces on flexion and does not significantly change on extension.  Mild degenerative disc height loss and osteophyte changes at C5-6.  No significant neural foraminal narrowing.  Multilevel facet arthropathy.  Prevertebral soft tissues and lung apices unremarkable.     Impression:     Degenerative findings      10/07/21    X-Ray Lumbar Complete Including Flex And Ext    Narrative  EXAMINATION:  XR LUMBAR SPINE 5 VIEW WITH FLEX AND EXT    CLINICAL HISTORY:  Back pain or radiculopathy, > 6 wks;  Dorsalgia, unspecified    TECHNIQUE:  Five views of the lumbar spine plus flexion extension views were performed.    COMPARISON:  None.    FINDINGS:  Status post cholecystectomy.  Aortic atherosclerosis.    Bones are demineralized.    No acute fracture.  No listhesis.  No instability with flexion/extension.    Multilevel marginal spurring.  Mild disc space narrowing at the lumbosacral junction.  Mild multilevel lower lumbar facet arthropathy.          ASSESSMENT: 65 y.o. year old female with     1. Lumbar spondylosis        2. Lumbar facet arthropathy        3. Degeneration of intervertebral disc of lumbar region, unspecified whether pain present                PLAN:   - Interventions:  none at this time  Lumbar radiculopathy resolved. Could consider LAURI in future if needed    - Anticoagulation use: Yes aspirin  --secondary ppx: CAD  ---per KAY guidelines for secondary prophylaxis, patient can  continue aspirin for lumbar medial branch block/potential RFA       report:  Reviewed and consistent with medication use as prescribed.    - Medications:  -Continue Gabapentin per outside provider  May take tylenol up to 2g daily as needed  - Therapy:   We discussed continuing at home physician directed physical therapy to help manage the patient/s painful condition. The patient was counseled that muscle strengthening will improve the long term prognosis in regards to pain and may also help increase range of motion and mobility.     - Imaging: Reviewed available imaging(lumbar x-ray) with patient and answered any questions they had regarding study.  Lumbar Mri reviewed with patient today.     - Records: Obtain old records from outside physicians and imaging  -[facility]      - Follow up visit:  5-6 months or sooner if needed    The above plan and management options were discussed at length with patient. Patient is in agreement with the above and verbalized understanding.    - I discussed the goals of interventional chronic pain management with the patient on today's visit. We discussed a multimodal and systematic approach to pain.  This includes diagnostic and therapeutic injections, adjuvant pharmacologic treatment, physical therapy, and at times psychiatry.  I emphasized the importance of regular exercise, core strengthening and stretching, diet and weight loss as a cornerstone of long-term pain management.    - This condition does not require this patient to take time off of work, and the primary goal of our Pain Management services is to improve the patient's functional capacity.  - Patient Questions: Answered all of the patient's questions regarding diagnoses, therapy, treatment and next steps    Visit today included increased complexity associated with the care of the episodic problem of chronic pain which was addressed and continue to manage the longitudinal care of the patient due to the  serious and/or complex managed problem(s) listed above.    - Direct patient care provided: 20 minutes+. Over 50% of the time was spent counseling/educating the patient. Total time spent on patient care including prep time reviewing the chart before the visit, time spent with patient during the visit, and the time spent after the visit reviewing studies, documenting note, obtaining information from collaborative providers, etc.: >40 minutes.       Reanta Doshi NP  Interventional Pain Management  [facility]    Disclaimer:  This note was prepared using voice recognition system and is likely to have sound alike errors that may have been overlooked even after proof reading.  Please call me with any questions

## 2025-06-11 ENCOUNTER — OFFICE VISIT (OUTPATIENT)
Dept: PAIN MEDICINE | Facility: CLINIC | Age: 65
End: 2025-06-11
Payer: COMMERCIAL

## 2025-06-11 DIAGNOSIS — M51.369 DEGENERATION OF INTERVERTEBRAL DISC OF LUMBAR REGION, UNSPECIFIED WHETHER PAIN PRESENT: ICD-10-CM

## 2025-06-11 DIAGNOSIS — M47.816 LUMBAR SPONDYLOSIS: Primary | ICD-10-CM

## 2025-06-11 DIAGNOSIS — M47.816 LUMBAR FACET ARTHROPATHY: ICD-10-CM

## 2025-06-11 PROCEDURE — 98005 SYNCH AUDIO-VIDEO EST LOW 20: CPT | Mod: 95,,, | Performed by: NURSE PRACTITIONER

## 2025-06-20 ENCOUNTER — LAB VISIT (OUTPATIENT)
Dept: LAB | Facility: HOSPITAL | Age: 65
End: 2025-06-20
Attending: FAMILY MEDICINE
Payer: COMMERCIAL

## 2025-06-20 DIAGNOSIS — I10 ESSENTIAL HYPERTENSION: ICD-10-CM

## 2025-06-20 DIAGNOSIS — E87.6 LOW SERUM POTASSIUM LEVEL: ICD-10-CM

## 2025-06-20 DIAGNOSIS — R73.03 PREDIABETES: ICD-10-CM

## 2025-06-20 LAB
ALBUMIN SERPL BCP-MCNC: 4.2 G/DL (ref 3.5–5.2)
ALP SERPL-CCNC: 77 UNIT/L (ref 40–150)
ALT SERPL W/O P-5'-P-CCNC: 9 UNIT/L (ref 10–44)
ANION GAP (OHS): 12 MMOL/L (ref 8–16)
AST SERPL-CCNC: 16 UNIT/L (ref 11–45)
BILIRUB SERPL-MCNC: 1.3 MG/DL (ref 0.1–1)
BUN SERPL-MCNC: 14 MG/DL (ref 8–23)
CALCIUM SERPL-MCNC: 9.2 MG/DL (ref 8.7–10.5)
CHLORIDE SERPL-SCNC: 100 MMOL/L (ref 95–110)
CO2 SERPL-SCNC: 23 MMOL/L (ref 23–29)
CREAT SERPL-MCNC: 0.9 MG/DL (ref 0.5–1.4)
EAG (OHS): 91 MG/DL (ref 68–131)
GFR SERPLBLD CREATININE-BSD FMLA CKD-EPI: >60 ML/MIN/1.73/M2
GLUCOSE SERPL-MCNC: 96 MG/DL (ref 70–110)
HBA1C MFR BLD: 4.8 % (ref 4–5.6)
POTASSIUM SERPL-SCNC: 3.6 MMOL/L (ref 3.5–5.1)
PROT SERPL-MCNC: 7.3 GM/DL (ref 6–8.4)
SODIUM SERPL-SCNC: 135 MMOL/L (ref 136–145)

## 2025-06-20 PROCEDURE — 80053 COMPREHEN METABOLIC PANEL: CPT

## 2025-06-20 PROCEDURE — 83036 HEMOGLOBIN GLYCOSYLATED A1C: CPT

## 2025-06-20 PROCEDURE — 36415 COLL VENOUS BLD VENIPUNCTURE: CPT

## 2025-06-24 NOTE — PROGRESS NOTES
"Subjective:       Patient ID: María Julian is a 65 y.o. female.    Chief Complaint:    FOLLOW UP ON CVID / SAD-NI, RECURRENT SINO- BRONCHITIS, ASTHMA- COPD, EX CIGARETTE SMOKER, HEMOCHROMATOSIS, CHRONIC MALAISE, MULTIPLE DRUG ALLERGIES.    HPI:     female, 65- year old with the above complaints- for follow up visit.  Overall doing well Monthly IVIG that she has been for over a decade has prevented her from getting recurrent infections   Improved the  quality of her life. Trough IgG levels have been great. - 1114 mg / dl on 03- 14- 2025.  Any breakthrough infections are promptly treated.  About 15 years ago SPTs revealed allergies to indoor and outdoor aero allergens. She never was on AIT / SCIT. DOING WELL ON SYMPTOMATIC TREATMENT.  She is aware of the environmental control measures.  She has ASTHMA- COPD, AS AN EX SMOKER. She is well versed with asthma triggers. NO RECENT SPIROGRAM OR                                                                                                                                                                                               .  She is very much helped by Trelegy 200 / 62.5 / 25 daily and a TAYA , PROAIR HFA, , as a quick reliever     She recently had a check up with PCP David Ochoa MD.  He ordered CBC, CMP. FERRITIN, Hb A-1-C, Fe and TIBC,TSH and lipid panel. All tested were normal.    NO RECENT SPIROGRAM OR FeNO. She will follow up with her hepatologist and monitor liver functions.  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      Has a history of tendonitis with Levofloxacin.              Erythromycin, Macrolide antibiotics, Iodine and iodide containing products, and Levofloxacin - ALLERGIES WERE REPORTED.    Past Medical History:   Diagnosis Date    Abnormal Pap smear of cervix     Allergy     Asthma     COPD (chronic obstructive pulmonary disease)     Genital warts 4/2021    Malignant " hyperthermia     pt's mother has hx of MH.  Pt denies any complications with general anesthesia    Recurrent upper respiratory infection (URI)        Family History   Problem Relation Name Age of Onset    Malignant hypertension Mother Elis Black     Heart disease Mother Elis Black     Heart failure Mother Elis Black     Cancer Father Raymond Black         Liver cancer    Diabetes Father Raymond Black     Ovarian cancer Sister Sharda Black     Alcohol abuse Sister Sharda Black     Early death Sister Sharda Black     Early death Sister Cuca Hernandez         Liver    Cancer Sister Dali Booth         Ovarian    Stroke Sister Dali Booth     Ovarian cancer Sister Sharda Black     Alcohol abuse Sister Sharda Black     Early death Sister Sharda Black     Alcohol abuse Sister Sharda Black     Early death Sister Sharda Black        Environmental History: Dust Mite Controls: Dust mite controls are already in place.     Review of Systems   Constitutional:  Positive for fatigue.   HENT:  Positive for congestion and postnasal drip.    Eyes:  Positive for itching.   Respiratory:  Positive for cough.    Cardiovascular: Negative.    Gastrointestinal: Negative.    Endocrine: Negative.    Genitourinary: Negative.    Musculoskeletal: Negative.    Skin: Negative.    Neurological: Negative.  Negative for dizziness.   Hematological: Negative.    Psychiatric/Behavioral: Negative.       Objective:     There were no vitals taken for this visit.    Physical Exam  Vitals and nursing note reviewed.   Constitutional:       Appearance: Normal appearance. She is normal weight.   HENT:      Head: Normocephalic and atraumatic.      Right Ear: Tympanic membrane, ear canal and external ear normal.      Left Ear: Tympanic membrane, ear canal and external ear normal.      Nose: Congestion and rhinorrhea present.      Mouth/Throat:      Mouth: Mucous membranes are moist.      Pharynx: Oropharynx is clear.   Eyes:      Extraocular Movements: Extraocular  movements intact.      Conjunctiva/sclera: Conjunctivae normal.      Pupils: Pupils are equal, round, and reactive to light.   Cardiovascular:      Rate and Rhythm: Normal rate and regular rhythm.      Pulses: Normal pulses.      Heart sounds: Normal heart sounds.   Pulmonary:      Effort: Pulmonary effort is normal.      Breath sounds: Normal breath sounds.   Abdominal:      General: Abdomen is flat. Bowel sounds are normal.      Palpations: Abdomen is soft.   Musculoskeletal:         General: Normal range of motion.      Cervical back: Normal range of motion and neck supple.   Skin:     General: Skin is warm and dry.      Capillary Refill: Capillary refill takes less than 2 seconds.   Neurological:      General: No focal deficit present.      Mental Status: She is alert and oriented to person, place, and time. Mental status is at baseline.   Psychiatric:         Mood and Affect: Mood normal.         Behavior: Behavior normal.         Thought Content: Thought content normal.         Judgment: Judgment normal.       Assessment:      1. CVID (common variable immunodeficiency)    2. 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 Hereditary hemochromatosis    4. Asthma with COPD    5. Ex-cigarette smoker    6. Recurrent sinusitis    7. Bronchitis    8        LEVAQUIN - TENDONITIS  9        COLONIC POLYPS  RECURRENT- UNDERGOES YEARLY COLONOSCOPY CLAUDETTE BLACK MD.      Plan:     Monthly IVIG infusions PRIVIGEN 10 % 30-  GRAMS- - 300 ,l infused over 4 hours.  Monitor trough IgG  q 4 months.  Monitor for short and long term sequelae of CVID. Trough -1114 mg / dl on 03- 14- 2025.    On 06- 20- 2025-- routine lab tests ordered by David Ochoa MD, PCP.-- discussed.  ----------------------------------------------------------------------------------------------------  Trelegy 200 / 62.5 / 25-- one puff daily  Proair HFA 90 mcg 2 puffs tid prn  Nebulized Duoneb plus Budesonide  0.50 mg bid prn.  Treat all infections- A course of Augmentin 875 mg bid plus Prednisone 20 mg ad for 3- 5  days  -----------------------------------------------------------------------------------------------------------  Had AREXVY vaccine.  Up to date on adult immunizations.  Follow up with William Stephens MD- for yearly Colonoscopy.                  Problems Address                                                 Amount and/or Complexity                                                                      Risk       3           [] 2 or more self-limited or minor problems                      [] Limited                                                                        [] Low                  [] 1 stable chronic illness                                                  Any combination of the two                                               OTC drugs                  []Acute, uncomplicated illness or injury                            Review of prior external notes from unique source           Minor surgery with no risk factors                                                                                                               [] 1 []2  []3+                                                                                                              Review of results from each unique test                                                                                                               [] 1 []2  [] 3+                                                                                                              Order of each unique test                                                                                                               [] 1 []2  [] 3+                                                                                                              Or                                                                                                             [] Assessment  requiring an independent historian      4            [] One or more chronic illness with exacerbation,              [] Moderate                                                                      [] Moderate                 Progression, or side effects of treatment                            -test documents or independent historians                        Prescription drug management                []  2 or more sable chronic illnesses                                    [] Independent interpretation of tests                              Minor surgery with identifiable risk                [] 1 undiagnosed new problem with uncertain prognosis    [] Discussion or management of test results                    elective major surgery                [] 1 acute illness with                systemic symptoms                                                                                                                                                              [] 1 acute complicated injury                                                                                                                                          Elective major surgery                                                                                                                                                                                                                                                                                                                                                                                                  5            [x] 1 or more chronic illnesses with severe exacerbation,     [x] Extensive(two from below)                                         [x] High                                                                                                               [] Independent interpretation of results                         Drug therapy requiring intensive                                                                                                                []Discussion of management or test interpretation           monitoring                                                                                                                                                                                                       Decision to de-escalate care                 [] 1 acute or chronic illness or injury that poses a threat                                                                                               Decision regarding hospitalization                                                                                                                                                                                                                  .

## 2025-06-25 ENCOUNTER — OFFICE VISIT (OUTPATIENT)
Dept: ALLERGY | Facility: CLINIC | Age: 65
End: 2025-06-25
Payer: COMMERCIAL

## 2025-06-25 VITALS
HEART RATE: 76 BPM | DIASTOLIC BLOOD PRESSURE: 81 MMHG | BODY MASS INDEX: 23.96 KG/M2 | WEIGHT: 143.94 LBS | SYSTOLIC BLOOD PRESSURE: 126 MMHG | TEMPERATURE: 98 F

## 2025-06-25 DIAGNOSIS — J44.89 ASTHMA WITH COPD: ICD-10-CM

## 2025-06-25 DIAGNOSIS — Z87.891 EX-CIGARETTE SMOKER: ICD-10-CM

## 2025-06-25 DIAGNOSIS — D83.9 CVID (COMMON VARIABLE IMMUNODEFICIENCY): Primary | ICD-10-CM

## 2025-06-25 DIAGNOSIS — J32.9 RECURRENT SINUSITIS: ICD-10-CM

## 2025-06-25 DIAGNOSIS — D80.6 SPECIFIC ANTIBODY DEFICIENCY WITH NORMAL IG CONCENTRATION AND NORMAL NUMBER OF B CELLS: ICD-10-CM

## 2025-06-25 DIAGNOSIS — E83.110 HEREDITARY HEMOCHROMATOSIS: ICD-10-CM

## 2025-06-25 DIAGNOSIS — J40 BRONCHITIS: ICD-10-CM

## 2025-06-25 PROCEDURE — 99215 OFFICE O/P EST HI 40 MIN: CPT | Mod: S$GLB,,, | Performed by: SPECIALIST

## 2025-06-25 PROCEDURE — 99999 PR PBB SHADOW E&M-EST. PATIENT-LVL III: CPT | Mod: PBBFAC,,, | Performed by: SPECIALIST

## 2025-06-28 DIAGNOSIS — R73.03 PREDIABETES: ICD-10-CM

## 2025-06-28 NOTE — TELEPHONE ENCOUNTER
No care due was identified.  [facility] Embedded Care Due Messages. Reference number: [id number].   6/28/2025 5:47:02 AM CDT

## 2025-06-29 RX ORDER — METFORMIN HYDROCHLORIDE 500 MG/1
500 TABLET ORAL
Qty: 90 TABLET | Refills: 1 | Status: SHIPPED | OUTPATIENT
Start: 2025-06-29

## 2025-06-29 NOTE — TELEPHONE ENCOUNTER
Refill Decision Note   María Julian  is requesting a refill authorization.  Brief Assessment and Rationale for Refill:  Approve     Medication Therapy Plan:         Comments:     Note composed:1:19 PM 06/29/2025

## 2025-07-03 ENCOUNTER — RESULTS FOLLOW-UP (OUTPATIENT)
Dept: INTERNAL MEDICINE | Facility: CLINIC | Age: 65
End: 2025-07-03

## 2025-07-07 ENCOUNTER — HOSPITAL ENCOUNTER (OUTPATIENT)
Dept: RADIOLOGY | Facility: HOSPITAL | Age: 65
Discharge: HOME | End: 2025-07-07
Attending: FAMILY MEDICINE
Payer: COMMERCIAL

## 2025-07-07 DIAGNOSIS — R91.1 SOLITARY PULMONARY NODULE: ICD-10-CM

## 2025-07-07 DIAGNOSIS — R91.8 ABNORMAL CT LUNG SCREENING: ICD-10-CM

## 2025-07-07 DIAGNOSIS — Z12.2 SCREENING FOR LUNG CANCER: ICD-10-CM

## 2025-07-07 DIAGNOSIS — Z87.891 FORMER SMOKER: ICD-10-CM

## 2025-07-07 DIAGNOSIS — Z00.00 ANNUAL PHYSICAL EXAM: ICD-10-CM

## 2025-07-07 PROCEDURE — 71271 CT THORAX LUNG CANCER SCR C-: CPT | Mod: TC

## 2025-07-07 PROCEDURE — 71271 CT THORAX LUNG CANCER SCR C-: CPT | Mod: 26,,, | Performed by: RADIOLOGY

## 2025-07-31 DIAGNOSIS — I10 ESSENTIAL HYPERTENSION: ICD-10-CM

## 2025-07-31 RX ORDER — HYDROCHLOROTHIAZIDE 12.5 MG/1
12.5 TABLET ORAL
Qty: 90 TABLET | Refills: 3 | Status: SHIPPED | OUTPATIENT
Start: 2025-07-31

## 2025-08-01 DIAGNOSIS — J45.40 MODERATE PERSISTENT ASTHMA WITHOUT COMPLICATION: ICD-10-CM

## 2025-08-01 DIAGNOSIS — J44.9 CHRONIC OBSTRUCTIVE PULMONARY DISEASE, UNSPECIFIED COPD TYPE: ICD-10-CM

## 2025-08-01 RX ORDER — FLUTICASONE FUROATE, UMECLIDINIUM BROMIDE AND VILANTEROL TRIFENATATE 200; 62.5; 25 UG/1; UG/1; UG/1
1 POWDER RESPIRATORY (INHALATION)
Qty: 60 EACH | Refills: 7 | Status: SHIPPED | OUTPATIENT
Start: 2025-08-01

## 2025-08-11 PROBLEM — J32.9 RECURRENT SINUSITIS: Status: RESOLVED | Noted: 2021-02-04 | Resolved: 2025-08-11

## (undated) DEVICE — PAD ABD 8X10 STERILE

## (undated) DEVICE — ACETIC ACID 3%

## (undated) DEVICE — CONTAINER SPECIMEN STRL 4OZ

## (undated) DEVICE — MANIFOLD 4 PORT

## (undated) DEVICE — SEE L#152161

## (undated) DEVICE — DRESSING TELFA STRL 4X3 LF

## (undated) DEVICE — BRIEF MESH LARGE

## (undated) DEVICE — COVER OVERHEAD SURG LT BLUE

## (undated) DEVICE — CATH URETHRAL 16FR RED

## (undated) DEVICE — PACK DRAPE PERI/GYN TIBURON

## (undated) DEVICE — SEE MEDLINE ITEM 154981

## (undated) DEVICE — SEE MEDLINE ITEM 157027

## (undated) DEVICE — SOL NS 1000CC

## (undated) DEVICE — UNDERGLOVE BIOGEL PI SZ 6.5 LF

## (undated) DEVICE — SEE MEDLINE ITEM 157181

## (undated) DEVICE — STRAP FOOT STIRRUP 3 X 22

## (undated) DEVICE — CONTAINER SPECIMEN 4OZ

## (undated) DEVICE — SEE MEDLINE ITEM 157117

## (undated) DEVICE — SEE MEDLINE ITEM 152622